# Patient Record
Sex: FEMALE | Race: WHITE | Employment: OTHER | URBAN - METROPOLITAN AREA
[De-identification: names, ages, dates, MRNs, and addresses within clinical notes are randomized per-mention and may not be internally consistent; named-entity substitution may affect disease eponyms.]

---

## 2017-03-16 ENCOUNTER — OFFICE VISIT (OUTPATIENT)
Dept: DERMATOLOGY | Facility: CLINIC | Age: 82
End: 2017-03-16

## 2017-03-16 DIAGNOSIS — L57.0 AK (ACTINIC KERATOSIS): Primary | ICD-10-CM

## 2017-03-16 DIAGNOSIS — L82.1 SK (SEBORRHEIC KERATOSIS): ICD-10-CM

## 2017-03-16 ASSESSMENT — PAIN SCALES - GENERAL: PAINLEVEL: NO PAIN (0)

## 2017-03-16 NOTE — PROGRESS NOTES
[facility] Dermatology Note      Dermatology Problem List:  1. AK  2. SK  Last CSE: 3/16/17    Encounter Date: Mar 16, 2017    CC:   Chief Complaint   Patient presents with     Derm Problem     Patient comes to clinic today for a skin exam. States no specific concerns at this time.         History of Present Illness:  Ms. Anca Alanis is a 89 year old female presenting for a skin examination, reporting raised spots on her back that she has accumulated over years, none painful or tender. She also reports a persistent scaly lesion on her L cheek, present for months, that she frequently scratches off.    Past Medical History:   Patient Active Problem List   Diagnosis     Anxiety state     Backache     Dysthymic disorder     Hearing loss     Spinal stenosis, lumbar region, without neurogenic claudication     Osteoarthritis of multiple joints     Osteoporosis     Scoliosis (and kyphoscoliosis), idiopathic     Health Care Home     Sleep disorder     Moderate stage glaucoma - Left Eye     Senile cataracts of both eyes     Late onset Alzheimer's disease without behavioral disturbance     Past Medical History   Diagnosis Date     Cataracts, both eyes      Glaucoma suspect      History reviewed. No pertinent past surgical history.    Family History:  There is no family history of melanoma.    Medications:  Current Outpatient Prescriptions   Medication Sig Dispense Refill     alendronate (FOSAMAX) 70 MG tablet Take 1 tablet (70 mg) by mouth every 7 days 12 tablet 3     memantine (NAMENDA) 5 MG tablet Take 1 tablet (5 mg) by mouth 2 times daily 180 tablet 3     donepezil (ARICEPT) 5 MG tablet Take 1 tablet (5 mg) by mouth At Bedtime 90 tablet 3     acetaminophen (TYLENOL) 325 MG tablet Take 2 tablets (650 mg) by mouth 3 times daily For 5 days 100 tablet 0     zolpidem (AMBIEN) 5 MG tablet Take 0.5 tablets (2.5 mg) by mouth nightly as needed for sleep (Use only when having difficulty sleeping in an  environment away from home.  Try 1/2 tablet and use sparingly.) 5 tablet 0     Calcium Carbonate-Vitamin D (CALCIUM + D PO) Take  by mouth.       Multiple Vitamin (MULTIVITAMIN OR) Take  by mouth.       ibuprofen (ADVIL,MOTRIN) 600 MG tablet Take 1 tablet (600 mg) by mouth every 6 hours as needed for moderate pain For 5 days (Patient not taking: Reported on 3/16/2017) 90 tablet 1        Allergies   Allergen Reactions     Ciprofloxacin      Sulfa Drugs          Review of Systems:  General: No recent infections, fevers, chills, malaise, arthralgias, unexplained weight/appetite changes  Skin: No new/changing moles, nothing bleeding/nonhealing/painful/tender/rapidly-growing.  Lymphatic: No subcutaneous lumps/bumps.      Physical exam:  Vitals: There were no vitals taken for this visit.  GEN: Well-appearing elderly female, no discomfort or distress, cooperative with the exam  SKIN: Full skin, which includes the head/face, both arms, chest, back, abdomen,both legs, genitalia and/or groin buttocks, digits and/or nails, was examined.  - phototype I-II, mild dermatoheliosis  - L cheek: gritty 2mm papule  - numerous light brown waxy planar papules and plaques, none inflamed.  - occasional round red-purple macules/papules on the trunk  -No other lesions of concern on areas examined.     Impression/Plan:  1. Actinic keratosis , L medial cheek.  - discussed premalignant nature  - cryotherapy procedure note: After verbal consent and discussion of risks and benefits including but no limited to dyspigmentation/scar, blister, and pain, a single lesion was(were) treated with 1-2mm freeze border for 2 cycles with liquid nitrogen. Post cryotherapy instructions were provided.     2. Seborrheic keratoses  3. Cherry angiomas  - Discussed benign nature and reassured.    Follow-up: 1 year      Discussed and examined with [facility] staff dermatologist Dr. Rosanna Singleton.    Tariq Resendiz MD, MARCIO  PGY-4, Dermatology      Staff  Involved:  Resident(Tariq Resendiz)/Staff(as above)  I was present for the entire procedure. KB  .I, Rosanna Singleton MD, saw this patient with the resident and agree with the resident s findings and plan of care as documented in the resident s note.

## 2017-03-16 NOTE — NURSING NOTE
Dermatology Rooming Note    Anca Alanis's goals for this visit include:   Chief Complaint   Patient presents with     Derm Problem     Patient comes to clinic today for a skin exam. States no specific concerns at this time.     Kati Ayers, CMA

## 2017-03-16 NOTE — LETTER
3/16/2017       RE: Anca Alanis  [address]     [place] [address]     Dear Colleague,    Thank you for referring your patient, Anca Alanis, to the [facility] DERMATOLOGY at [facility]. Please see a copy of my visit note below.    [facility] Dermatology Note      Dermatology Problem List:  1. AK  2. SK  Last CSE: 3/16/17    Encounter Date: Mar 16, 2017    CC:   Chief Complaint   Patient presents with     Derm Problem     Patient comes to clinic today for a skin exam. States no specific concerns at this time.         History of Present Illness:  Ms. Anca Alanis is a 89 year old female presenting for a skin examination, reporting raised spots on her back that she has accumulated over years, none painful or tender. She also reports a persistent scaly lesion on her L cheek, present for months, that she frequently scratches off.    Past Medical History:   Patient Active Problem List   Diagnosis     Anxiety state     Backache     Dysthymic disorder     Hearing loss     Spinal stenosis, lumbar region, without neurogenic claudication     Osteoarthritis of multiple joints     Osteoporosis     Scoliosis (and kyphoscoliosis), idiopathic     Health Care Home     Sleep disorder     Moderate stage glaucoma - Left Eye     Senile cataracts of both eyes     Late onset Alzheimer's disease without behavioral disturbance     Past Medical History   Diagnosis Date     Cataracts, both eyes      Glaucoma suspect      History reviewed. No pertinent past surgical history.    Family History:  There is no family history of melanoma.    Medications:  Current Outpatient Prescriptions   Medication Sig Dispense Refill     alendronate (FOSAMAX) 70 MG tablet Take 1 tablet (70 mg) by mouth every 7 days 12 tablet 3     memantine (NAMENDA) 5 MG tablet Take 1 tablet (5 mg) by mouth 2 times daily 180 tablet 3     donepezil (ARICEPT) 5 MG tablet Take 1 tablet (5 mg)  by mouth At Bedtime 90 tablet 3     acetaminophen (TYLENOL) 325 MG tablet Take 2 tablets (650 mg) by mouth 3 times daily For 5 days 100 tablet 0     zolpidem (AMBIEN) 5 MG tablet Take 0.5 tablets (2.5 mg) by mouth nightly as needed for sleep (Use only when having difficulty sleeping in an environment away from home.  Try 1/2 tablet and use sparingly.) 5 tablet 0     Calcium Carbonate-Vitamin D (CALCIUM + D PO) Take  by mouth.       Multiple Vitamin (MULTIVITAMIN OR) Take  by mouth.       ibuprofen (ADVIL,MOTRIN) 600 MG tablet Take 1 tablet (600 mg) by mouth every 6 hours as needed for moderate pain For 5 days (Patient not taking: Reported on 3/16/2017) 90 tablet 1        Allergies   Allergen Reactions     Ciprofloxacin      Sulfa Drugs          Review of Systems:  General: No recent infections, fevers, chills, malaise, arthralgias, unexplained weight/appetite changes  Skin: No new/changing moles, nothing bleeding/nonhealing/painful/tender/rapidly-growing.  Lymphatic: No subcutaneous lumps/bumps.      Physical exam:  Vitals: There were no vitals taken for this visit.  GEN: Well-appearing elderly female, no discomfort or distress, cooperative with the exam  SKIN: Full skin, which includes the head/face, both arms, chest, back, abdomen,both legs, genitalia and/or groin buttocks, digits and/or nails, was examined.  - phototype I-II, mild dermatoheliosis  - L cheek: gritty 2mm papule  - numerous light brown waxy planar papules and plaques, none inflamed.  - occasional round red-purple macules/papules on the trunk  -No other lesions of concern on areas examined.     Impression/Plan:  1. Actinic keratosis , L medial cheek.  - discussed premalignant nature  - cryotherapy procedure note: After verbal consent and discussion of risks and benefits including but no limited to dyspigmentation/scar, blister, and pain, a single lesion was(were) treated with 1-2mm freeze border for 2 cycles with liquid nitrogen. Post cryotherapy  instructions were provided.     2. Seborrheic keratoses  3. Cherry angiomas  - Discussed benign nature and reassured.    Follow-up: 1 year      Discussed and examined with [facility] staff dermatologist Dr. Rosanna Singleton.    Tariq Resendiz MD, MARCIO  PGY-4, Dermatology      Staff Involved:  Resident(Tariq Resendiz)/Staff(as above)  I was present for the entire procedure. KB  .I, Rosanna Singleton MD, saw this patient with the resident and agree with the resident s findings and plan of care as documented in the resident s note.      Again, thank you for allowing me to participate in the care of your patient.      Sincerely,    Tariq Resendiz MD

## 2017-04-20 DIAGNOSIS — M81.0 OSTEOPOROSIS: ICD-10-CM

## 2017-04-20 DIAGNOSIS — G30.1 LATE ONSET ALZHEIMER'S DISEASE WITHOUT BEHAVIORAL DISTURBANCE (H): ICD-10-CM

## 2017-04-20 DIAGNOSIS — F02.80 LATE ONSET ALZHEIMER'S DISEASE WITHOUT BEHAVIORAL DISTURBANCE (H): ICD-10-CM

## 2017-04-20 RX ORDER — MEMANTINE HYDROCHLORIDE 5 MG/1
5 TABLET ORAL 2 TIMES DAILY
Qty: 180 TABLET | Refills: 3 | Status: SHIPPED | OUTPATIENT
Start: 2017-04-20 | End: 2018-03-21

## 2017-04-20 RX ORDER — ALENDRONATE SODIUM 70 MG/1
70 TABLET ORAL
Qty: 12 TABLET | Refills: 3 | Status: SHIPPED | OUTPATIENT
Start: 2017-04-20 | End: 2018-07-09

## 2017-04-20 RX ORDER — DONEPEZIL HYDROCHLORIDE 5 MG/1
5 TABLET, FILM COATED ORAL AT BEDTIME
Qty: 90 TABLET | Refills: 3 | Status: SHIPPED | OUTPATIENT
Start: 2017-04-20 | End: 2018-03-21

## 2017-04-26 ENCOUNTER — OFFICE VISIT (OUTPATIENT)
Dept: FAMILY MEDICINE | Facility: CLINIC | Age: 82
End: 2017-04-26

## 2017-04-26 VITALS
OXYGEN SATURATION: 100 % | TEMPERATURE: 97.2 F | WEIGHT: 105 LBS | SYSTOLIC BLOOD PRESSURE: 131 MMHG | RESPIRATION RATE: 16 BRPM | DIASTOLIC BLOOD PRESSURE: 62 MMHG | HEIGHT: 64 IN | BODY MASS INDEX: 17.93 KG/M2 | HEART RATE: 57 BPM

## 2017-04-26 DIAGNOSIS — G47.00 INSOMNIA, UNSPECIFIED TYPE: ICD-10-CM

## 2017-04-26 DIAGNOSIS — G30.1 LATE ONSET ALZHEIMER'S DISEASE WITHOUT BEHAVIORAL DISTURBANCE (H): Primary | ICD-10-CM

## 2017-04-26 DIAGNOSIS — F02.80 LATE ONSET ALZHEIMER'S DISEASE WITHOUT BEHAVIORAL DISTURBANCE (H): Primary | ICD-10-CM

## 2017-04-26 DIAGNOSIS — M81.0 OSTEOPOROSIS: ICD-10-CM

## 2017-04-26 DIAGNOSIS — R53.83 FATIGUE, UNSPECIFIED TYPE: ICD-10-CM

## 2017-04-26 LAB
% GRANULOCYTES: 72.8 %G (ref 40–75)
BUN SERPL-MCNC: 26.6 MG/DL (ref 7–19)
CALCIUM SERPL-MCNC: 10 MG/DL (ref 8.5–10.1)
CHLORIDE SERPLBLD-SCNC: 101 MMOL/L (ref 98–110)
CO2 SERPL-SCNC: 28.3 MMOL/L (ref 20–32)
CREAT SERPL-MCNC: 0.9 MG/DL (ref 0.5–1)
FOLATE SERPL-MCNC: 74.7 NG/ML
GFR SERPL CREATININE-BSD FRML MDRD: 62.2 ML/MIN/1.7 M2
GLUCOSE SERPL-MCNC: 107.3 MG'DL (ref 70–99)
GRANULOCYTES #: 3.1 K/UL (ref 1.6–8.3)
HCT VFR BLD AUTO: 40.9 % (ref 35–47)
HEMOGLOBIN: 12.8 G/DL (ref 11.7–15.7)
LYMPHOCYTES # BLD AUTO: 0.9 K/UL (ref 0.8–5.3)
LYMPHOCYTES NFR BLD AUTO: 20.6 %L (ref 20–48)
MCH RBC QN AUTO: 33.2 PG (ref 26.5–35)
MCHC RBC AUTO-ENTMCNC: 31.3 G/DL (ref 32–36)
MCV RBC AUTO: 106.3 FL (ref 78–100)
MID #: 0.3 K/UL (ref 0–2.2)
MID %: 6.6 %M (ref 0–20)
PLATELET # BLD AUTO: 188 K/UL (ref 150–450)
POTASSIUM SERPL-SCNC: 4.1 MMOL/DL (ref 3.3–4.5)
RBC # BLD AUTO: 3.85 M/UL (ref 3.8–5.2)
SODIUM SERPL-SCNC: 135.8 MMOL/L (ref 132.6–141.4)
TSH SERPL DL<=0.005 MIU/L-ACNC: 2.45 MU/L (ref 0.4–4)
VIT B12 SERPL-MCNC: 525 PG/ML (ref 193–986)
WBC # BLD AUTO: 4.3 K/UL (ref 4–11)

## 2017-04-26 RX ORDER — ZOLPIDEM TARTRATE 5 MG/1
2.5 TABLET ORAL
Qty: 5 TABLET | Refills: 0 | Status: SHIPPED | OUTPATIENT
Start: 2017-04-26 | End: 2018-03-03

## 2017-04-26 ASSESSMENT — ENCOUNTER SYMPTOMS
UNEXPECTED WEIGHT CHANGE: 0
FEVER: 0
CARDIOVASCULAR NEGATIVE: 1
DYSPHORIC MOOD: 0
FATIGUE: 1
WEAKNESS: 0
MYALGIAS: 0
ACTIVITY CHANGE: 0
NERVOUS/ANXIOUS: 0
CONFUSION: 1
RESPIRATORY NEGATIVE: 1
DIZZINESS: 0
GASTROINTESTINAL NEGATIVE: 1

## 2017-04-26 NOTE — MR AVS SNAPSHOT
After Visit Summary   2017    Anca Alanis    MRN: [id number]           Patient Information     Date Of Birth          1924        Visit Information        Provider Department      2017 12:50 PM Rafat Richards MD [facility]        Today's Diagnoses     Late onset Alzheimer's disease without behavioral disturbance    -  1    Osteoporosis        Fatigue, unspecified type        Insomnia, unspecified type          Care Instructions    1.  Continue same medications    2.  Use zolpidem sparingly when traveling for sleep    3.  Return in a month.        Follow-ups after your visit        Follow-up notes from your care team     Return in about 4 weeks (around 2017).      Who to contact     Please call your clinic at [phone] to:    Ask questions about your health    Make or cancel appointments    Discuss your medicines    Learn about your test results    Speak to your doctor   If you have compliments or concerns about an experience at your clinic, or if you wish to file a complaint, please contact [facility] Patient Relations at [phone] or email us at [email or web address]         Additional Information About Your Visit        MyChart Information     [employer] is an electronic gateway that provides easy, online access to your medical records. With [employer], you can request a clinic appointment, read your test results, renew a prescription or communicate with your care team.     To sign up for [employer] visit the website at [email or web address]   You will be asked to enter the access code listed below, as well as some personal information. Please follow the directions to create your username and password.     Your access code is: [email or web address]  Expires: 2017  7:30 AM     Your access code will  in 90 days. If you need help or a new code, please contact your [facility] or call  "[phone] for assistance.        Care EveryWhere ID     This is your Care EveryWhere ID. This could be used by other organizations to access your [facility] medical records  [id number]        Your Vitals Were     Pulse Temperature Respirations Height Pulse Oximetry BMI (Body Mass Index)    57 97.2  F (36.2  C) (Oral) 16 5' 4\" (162.6 cm) 100% 18.02 kg/m2       Blood Pressure from Last 3 Encounters:   04/26/17 131/62   06/29/16 134/66   04/06/16 145/75    Weight from Last 3 Encounters:   04/26/17 105 lb (47.6 kg)   06/29/16 106 lb 9.6 oz (48.4 kg)   04/06/16 107 lb (48.5 kg)              We Performed the Following     Basic Metabolic Panel ([facility])     CBC with Diff Plt ([facility])     Folate     TSH with free T4 reflex     Vitamin B12     Vitamin D Level          Where to get your medicines      Some of these will need a paper prescription and others can be bought over the counter.  Ask your nurse if you have questions.     Bring a paper prescription for each of these medications     zolpidem 5 MG tablet          Primary Care Provider Office Phone # Fax #    Rafat Richards [phone]       [facility] [address] [address]        Thank you!     Thank you for choosing [facility]  for your care. Our goal is always to provide you with excellent care. Hearing back from our patients is one way we can continue to improve our services. Please take a few minutes to complete the written survey that you may receive in the mail after your visit with us. Thank you!             Your Updated Medication List - Protect others around you: Learn how to safely use, store and throw away your medicines at www.disposemymeds.org.          This list is accurate as of: 4/26/17  1:29 PM.  Always use your most recent med list.                   Brand Name Dispense Instructions for use    acetaminophen 325 MG tablet    TYLENOL    100 tablet    Take 2 tablets (650 mg) by mouth 3 " times daily For 5 days       alendronate 70 MG tablet    FOSAMAX    12 tablet    Take 1 tablet (70 mg) by mouth every 7 days       CALCIUM + D PO      Take  by mouth.       donepezil 5 MG tablet    ARIcept    90 tablet    Take 1 tablet (5 mg) by mouth At Bedtime       ibuprofen 600 MG tablet    ADVIL/MOTRIN    90 tablet    Take 1 tablet (600 mg) by mouth every 6 hours as needed for moderate pain For 5 days       memantine 5 MG tablet    NAMENDA    180 tablet    Take 1 tablet (5 mg) by mouth 2 times daily       MULTIVITAMIN PO      Take  by mouth.       zolpidem 5 MG tablet    AMBIEN    5 tablet    Take 0.5 tablets (2.5 mg) by mouth nightly as needed for sleep (Use only when having difficulty sleeping in an environment away from home.  Try 1/2 tablet and use sparingly.)

## 2017-04-26 NOTE — LETTER
May 1, 2017      Anca Alanis  [address] [address]        Dear Anca,    Thank you for getting your care at [facility]. Please see below for your test results.    Resulted Orders   Basic Metabolic Panel (\[facility] [place] [facility]\"")   Result Value Ref Range    Urea Nitrogen 26.6 (H) 7.0 - 19.0 mg/dL    Calcium 10.0 8.5 - 10.1 mg/dL    Chloride 101.0 98.0 - 110.0 mmol/L    Carbon Dioxide 28.3 20.0 - 32.0 mmol/L    Creatinine 0.9 0.5 - 1.0 mg/dL    Glucose 107.3 (H) 70.0 - 99.0 mg'dL    Potassium 4.1 3.3 - 4.5 mmol/dL    Sodium 135.8 132.6 - 141.4 mmol/L    GFR Estimate 62.2 >60.0 mL/min/1.7 m2    GFR Estimate If Black 75.3 >60.0 mL/min/1.7 m2   Folate   Result Value Ref Range    Folate 74.7 >5.4 ng/mL   Vitamin D Level   Result Value Ref Range    Vitamin D Deficiency screening 38 20 - 75 ug/L      Comment:      Season, race, dietary intake, and treatment affect the concentration of   25-hydroxy-Vitamin D. Values may decrease during winter months and increase   during summer months. Values 20-29 ug/L may indicate Vitamin D insufficiency   and values <20 ug/L may indicate Vitamin D deficiency.   Vitamin D determination is routinely performed by an immunoassay specific for   25 hydroxyvitamin D3.  If an individual is on vitamin D2 (ergocalciferol)   supplementation, please specify 25 OH vitamin D2 and D3 level determination   by   LCMSMS test VITD23.     Vitamin B12   Result Value Ref Range    Vitamin B12 525 193 - 986 pg/mL   CBC with Diff Plt (\[facility] [place] [facility]\"")   Result Value Ref Range    WBC 4.3 4.0 - 11.0 K/uL    Lymphocytes # 0.9 0.8 - 5.3 K/uL    % Lymphocytes 20.6 20.0 - 48.0 %L    Mid # 0.3 0.0 - 2.2 K/uL    Mid % 6.6 0.0 - 20.0 %M    GRANULOCYTES # 3.1 1.6 - 8.3 K/uL    % Granulocytes 72.8 40.0 - 75.0 %G    RBC 3.85 3.80 - 5.20 M/uL    Hemoglobin 12.8 11.7 - 15.7 g/dL    Hematocrit 40.9 35.0 - 47.0 %    .3 (H) 78.0 - 100.0 fL    MCH 33.2 26.5 - 35.0 pg    MCHC 31.3 (L) 32.0 - 36.0 g/dL     Platelets 188.0 150.0 - 450.0 K/uL   TSH with free T4 reflex   Result Value Ref Range    TSH 2.45 0.40 - 4.00 mU/L       All of your lab work looks excellent.      Sincerely,    Rafat Richards MD

## 2017-04-26 NOTE — PROGRESS NOTES
"      HPI:       Anca Alanis is a 89 year old who presents for the following  Patient presents with:  RECHECK: Alzheimer follow up, patient requesting to get toe nails trimmed    F/U AD.  Living with  and stable.  Independent in ADLs and dependent in all IADLs.  C/o generalized fatigue but still able to perform all her usual activities.  Going to [place] to visit son next week.   reports no acute concerns.  No falls.  No pain.         Problem, Medication and Allergy Lists were reviewed and are current.  Patient is an established patient of this clinic.         Review of Systems:   Review of Systems   Constitutional: Positive for fatigue. Negative for activity change, fever and unexpected weight change.   Respiratory: Negative.    Cardiovascular: Negative.    Gastrointestinal: Negative.    Musculoskeletal: Negative for gait problem and myalgias.   Neurological: Negative for dizziness and weakness.   Psychiatric/Behavioral: Positive for confusion. Negative for behavioral problems and dysphoric mood. The patient is not nervous/anxious.              Physical Exam:   Patient Vitals for the past 24 hrs:   BP Temp Temp src Pulse Resp SpO2 Height Weight   04/26/17 1257 131/62 97.2  F (36.2  C) Oral 57 16 100 % 5' 4\" (162.6 cm) 105 lb (47.6 kg)     Body mass index is 18.02 kg/(m^2).  Vitals were reviewed and were normal     Physical Exam   Constitutional: She appears well-developed. No distress.   Thin.     HENT:   Head: Normocephalic and atraumatic.   Neck: Neck supple. No thyromegaly present.   Cardiovascular: Normal rate, regular rhythm and normal heart sounds.  Exam reveals no gallop.    No murmur heard.  Pulmonary/Chest: Effort normal. No respiratory distress. She has no wheezes. She has no rales.   Musculoskeletal: She exhibits no edema or tenderness.   Normal gait.   Lymphadenopathy:     She has no cervical adenopathy.   Neurological: She is alert.   Psychiatric: She has a normal mood and " affect.   Vitals reviewed.        Results:       Assessment and Plan     Anca was seen today for recheck.    Diagnoses and all orders for this visit:    Late onset Alzheimer's disease without behavioral disturbance - middle stage.  Stable on memantine and donepezil.    Osteoporosis - continue alendronate, 2018 will be 5 yrs of therapy.    Fatigue, unspecified type - she has complained of this before.  No significant findings on PE.  Will check labwork.  -     Basic Metabolic Panel ([facility])  -     Folate  -     Vitamin D Level  -     Vitamin B12  -     CBC with Diff Plt ([facility])  -     TSH with free T4 reflex    Insomnia, unspecified type - zolpidem to be used ONLY for difficulty sleeping when traveling.  -     zolpidem (AMBIEN) 5 MG tablet; Take 0.5 tablets (2.5 mg) by mouth nightly as needed for sleep (Use only when having difficulty sleeping in an environment away from home.  Try 1/2 tablet and use sparingly.)      Medications Discontinued During This Encounter   Medication Reason     zolpidem (AMBIEN) 5 MG tablet Reorder     Options for treatment and follow-up care were reviewed with the patient. Anca IRAJ Zeke  engaged in the decision making process and verbalized understanding of the options discussed and agreed with the final plan.    Rafat Richards MD

## 2017-04-26 NOTE — PATIENT INSTRUCTIONS
1.  Continue same medications    2.  Use zolpidem sparingly when traveling for sleep    3.  Return in a month.

## 2017-04-27 LAB — DEPRECATED CALCIDIOL+CALCIFEROL SERPL-MC: 38 UG/L (ref 20–75)

## 2017-08-08 ENCOUNTER — OFFICE VISIT (OUTPATIENT)
Dept: INTERNAL MEDICINE | Facility: CLINIC | Age: 82
End: 2017-08-08

## 2017-08-08 DIAGNOSIS — L03.039 INFECTION OF TOENAIL: Primary | ICD-10-CM

## 2017-08-08 ASSESSMENT — PAIN SCALES - GENERAL: PAINLEVEL: NO PAIN (0)

## 2017-08-08 NOTE — NURSING NOTE
Chief Complaint   Patient presents with     Establish Care     pt here to establish care     Toenail     pt states she has an infection under toenail on right foot     Verna Blood CMA at 9:01 AM on 8/8/2017.

## 2017-08-08 NOTE — PATIENT INSTRUCTIONS
[facility] Medication Refill Request Information:  * Please contact your pharmacy regarding ANY request for medication refills.  ** [facility] Prescription Fax = [phone]  * Please allow 3 business days for routine medication refills.  * Please allow 5 business days for controlled substance medication refills.     [facility] Test Result notification information:  *You will be notified with in 7-10 days of your appointment day regarding the results of your test.  If you are on MyChart you will be notified as soon as the provider has reviewed the results and signed off on them.    [facility] [phone]

## 2017-08-08 NOTE — MR AVS SNAPSHOT
After Visit Summary   8/8/2017    Anca Alanis    MRN: [id number]           Patient Information     Date Of Birth          5/2/1924        Visit Information        Provider Department      8/8/2017 9:10 AM Haja Kothari MD [facility]        Care Instructions    Primary Care Center Medication Refill Request Information:  * Please contact your pharmacy regarding ANY request for medication refills.  ** [facility] Prescription Fax = [phone]  * Please allow 3 business days for routine medication refills.  * Please allow 5 business days for controlled substance medication refills.     Primary Care Center Test Result notification information:  *You will be notified with in 7-10 days of your appointment day regarding the results of your test.  If you are on MyChart you will be notified as soon as the provider has reviewed the results and signed off on them.    [facility] [phone]             Follow-ups after your visit        Your next 10 appointments already scheduled     Aug 09, 2017  2:40 PM CDT   Return Visit with Rafat Richards MD   [facility] ([facility])    [facility] [address] [place] [address],  [address]  [place] [address]   [phone]              Who to contact     Please call your clinic at [phone] to:    Ask questions about your health    Make or cancel appointments    Discuss your medicines    Learn about your test results    Speak to your doctor   If you have compliments or concerns about an experience at your clinic, or if you wish to file a complaint, please contact [facility] Patient Relations at [phone] or email us at [email or web address]         Additional Information About Your Visit        MyChart Information     [employer] Smart[employer] is an electronic gateway that provides easy, online access to your medical records. With [employer], you can request a clinic appointment, read your test results,  renew a prescription or communicate with your care team.     To sign up for MyChart visit the website at [email or web address]   You will be asked to enter the access code listed below, as well as some personal information. Please follow the directions to create your username and password.     Your access code is: [email or web address]  Expires: 10/30/2017  6:31 AM     Your access code will  in 90 days. If you need help or a new code, please contact your [facility] or call [phone] for assistance.        Care EveryWhere ID     This is your Care EveryWhere ID. This could be used by other organizations to access your [facility] medical records  [id number]         Blood Pressure from Last 3 Encounters:   17 (P) 91/57   17 131/62   16 134/66    Weight from Last 3 Encounters:   17 (P) 45.7 kg (100 lb 11.2 oz)   17 47.6 kg (105 lb)   16 48.4 kg (106 lb 9.6 oz)              Today, you had the following     No orders found for display       Primary Care Provider Office Phone # Fax #    Rafat Richards [phone]       [facility] [address] [address]        Equal Access to Services     CUCO [place] : Hadii aad ku hadasho Soomaali, waaxda luqadaha, qaybta kaalmada adeegyada, kiana velázquez. So [facility] [phone].    ATENCIÓN: Si habla español, tiene a gillette disposición servicios gratuitos de asistencia lingüística. Christine al [phone].    We comply with applicable federal civil rights laws and [place] laws. We do not discriminate on the basis of race, color, national origin, age, disability sex, sexual orientation or gender identity.            Thank you!     Thank you for choosing [facility]  for your care. Our goal is always to provide you with excellent care. Hearing back from our patients is one way we can continue to improve our services. Please take a few  minutes to complete the written survey that you may receive in the mail after your visit with us. Thank you!             Your Updated Medication List - Protect others around you: Learn how to safely use, store and throw away your medicines at www.disposemymeds.org.          This list is accurate as of: 8/8/17  9:44 AM.  Always use your most recent med list.                   Brand Name Dispense Instructions for use Diagnosis    acetaminophen 325 MG tablet    TYLENOL    100 tablet    Take 2 tablets (650 mg) by mouth 3 times daily For 5 days    Midline low back pain without sciatica       alendronate 70 MG tablet    FOSAMAX    12 tablet    Take 1 tablet (70 mg) by mouth every 7 days    Osteoporosis       CALCIUM + D PO      Take  by mouth.        donepezil 5 MG tablet    ARIcept    90 tablet    Take 1 tablet (5 mg) by mouth At Bedtime    Late onset Alzheimer's disease without behavioral disturbance       ibuprofen 600 MG tablet    ADVIL/MOTRIN    90 tablet    Take 1 tablet (600 mg) by mouth every 6 hours as needed for moderate pain For 5 days    Midline low back pain without sciatica       memantine 5 MG tablet    NAMENDA    180 tablet    Take 1 tablet (5 mg) by mouth 2 times daily    Late onset Alzheimer's disease without behavioral disturbance       MULTIVITAMIN PO      Take  by mouth.        zolpidem 5 MG tablet    AMBIEN    5 tablet    Take 0.5 tablets (2.5 mg) by mouth nightly as needed for sleep (Use only when having difficulty sleeping in an environment away from home.  Try 1/2 tablet and use sparingly.)    Insomnia, unspecified type

## 2017-08-08 NOTE — PROGRESS NOTES
PRIMARY CARE CLINIC    Resident Clinic Note        Visit Date: August 8, 2017    Anca Alanis  MRN: [id number]  YOB: 1924    HPI:  Anca Alanis is a 89 year old female  has a past medical history of Cataracts, Osteoporosis, Sleep disorder, Anxiety, and late onset Alzheimer's disease.     Patient presents to clinic with her  who helped with HPI.    She is her at the clinic with a signal issue, concerns for toe nail infection.  Patient report that her right great toe has been abnormal for several weeks with some breakdown of there toe nail and mild redness.  No issues with drainage or systemic symptoms including fever, chills or sweats.  No pain associated with the toe and the patient and her  think the toe is getting better without treatment.  She has not tried any topical or antibiotic treatment.  No history of trauma or recent falls.  Patient reports that her health is otherwise very good.    ROS:  7 point ROS was reviewed and negative other than stated in HPI    Past medical history reviewed.    Past Medical History:   Diagnosis Date     Cataracts, both eyes      Glaucoma suspect        Allergies   Allergen Reactions     Ciprofloxacin      Sulfa Drugs        No past surgical history on file.    Family History   Problem Relation Age of Onset     Hypertension Sister      DIABETES Paternal Grandmother      DIABETES Son        Social History     Social History     Marital status:      Spouse name: N/A     Number of children: N/A     Years of education: N/A     Occupational History     Not on file.     Social History Main Topics     Smoking status: Never Smoker     Smokeless tobacco: Not on file     Alcohol use No     Drug use: No     Sexual activity: Not on file     Other Topics Concern     Not on file     Social History Narrative       Current Outpatient Prescriptions   Medication     zolpidem (AMBIEN) 5 MG tablet     alendronate (FOSAMAX) 70 MG tablet     memantine  (NAMENDA) 5 MG tablet     donepezil (ARICEPT) 5 MG tablet     ibuprofen (ADVIL,MOTRIN) 600 MG tablet     acetaminophen (TYLENOL) 325 MG tablet     Calcium Carbonate-Vitamin D (CALCIUM + D PO)     Multiple Vitamin (MULTIVITAMIN OR)     No current facility-administered medications for this visit.        Vitals:  BP (P) 91/57  Pulse (P) 62  Wt (P) 45.7 kg (100 lb 11.2 oz)  BMI (P) 17.29 kg/m2    Physical Exam:  General: NAD, sitting in chair  HEENT: Oral mucosa moist and non-erythematous, PERRLA, EOM intact  CV: RRR, normal S1S2, no m/c/r  Resp: Clear to auscultation bilaterally, no wheezes or crackles  Abd: Soft, non-tender, BS+, no masses appreciated  Extremities: WWP, no pedal edema, right big toe nail slightly reduced with some healing sub nail erythema, no pain with palpation or discharge.    Neuro: AAOx3, no focal neurologic deficits     Assessment/Plan:  Patient presents for assessment of possible toe nail infection.    # Right great toe nail infection:  Appears to be resolving with no intervention, including antibiotics. non-painful, no signs of active infection, possible fungal etiology.    - No need for further intervention at this time   - If worsening would recommend referral to podiatry  - No need for acute follow up     Health Maintenance: No health maintenance this appointment    Follow-up: Follow up with Dr. Richards for normally scheduled care    Patient seen and discussed with Dr. Mavis Kothari MD, [facility]  Internal Medicine PGY-2  [facility]  Pager: [phone]       Teaching Physician Note:  I was present during the visit and the patient was seen and examined by me.   I discussed the case with the resident and agree with the note as documented by the resident with the following exceptions:  None.    Bessy Gamble M.D.  Internal Medicine   pager [phone]

## 2017-10-25 ENCOUNTER — OFFICE VISIT (OUTPATIENT)
Dept: FAMILY MEDICINE | Facility: CLINIC | Age: 82
End: 2017-10-25

## 2017-10-25 VITALS
WEIGHT: 103 LBS | BODY MASS INDEX: 17.58 KG/M2 | HEART RATE: 54 BPM | SYSTOLIC BLOOD PRESSURE: 133 MMHG | RESPIRATION RATE: 16 BRPM | DIASTOLIC BLOOD PRESSURE: 58 MMHG | TEMPERATURE: 97.6 F | OXYGEN SATURATION: 98 % | HEIGHT: 64 IN

## 2017-10-25 DIAGNOSIS — F02.80 LATE ONSET ALZHEIMER'S DISEASE WITHOUT BEHAVIORAL DISTURBANCE (H): Primary | ICD-10-CM

## 2017-10-25 DIAGNOSIS — K62.5 RECTAL BLEEDING: ICD-10-CM

## 2017-10-25 DIAGNOSIS — G30.1 LATE ONSET ALZHEIMER'S DISEASE WITHOUT BEHAVIORAL DISTURBANCE (H): Primary | ICD-10-CM

## 2017-10-25 ASSESSMENT — ENCOUNTER SYMPTOMS
NERVOUS/ANXIOUS: 0
ANAL BLEEDING: 1
FATIGUE: 0
CONFUSION: 1
RECTAL PAIN: 0
RESPIRATORY NEGATIVE: 1
MYALGIAS: 0
DYSPHORIC MOOD: 0
CARDIOVASCULAR NEGATIVE: 1
UNEXPECTED WEIGHT CHANGE: 0
ACTIVITY CHANGE: 0
FEVER: 0

## 2017-10-25 NOTE — MR AVS SNAPSHOT
After Visit Summary   10/25/2017    Anca Alanis    MRN: [id number]           Patient Information     Date Of Birth          1924        Visit Information        Provider Department      10/25/2017 3:00 PM Rafta Richards MD [facility]        Today's Diagnoses     Late onset Alzheimer's disease without behavioral disturbance    -  1    Rectal bleeding          Care Instructions    STOP alendronate beginning in January.    Try to drink more water during the day.          Follow-ups after your visit        Who to contact     Please call your clinic at [phone] to:    Ask questions about your health    Make or cancel appointments    Discuss your medicines    Learn about your test results    Speak to your doctor   If you have compliments or concerns about an experience at your clinic, or if you wish to file a complaint, please contact [facility] Patient Relations at [phone] or email us at [email or web address]         Additional Information About Your Visit        MyChart Information     [email or web address] is an electronic gateway that provides easy, online access to your medical records. With [employer] / [employer], you can request a clinic appointment, read your test results, renew a prescription or communicate with your care team.     To sign up for [email or web address] visit the website at [email or web address]   You will be asked to enter the access code listed below, as well as some personal information. Please follow the directions to create your username and password.     Your access code is: [email or web address]  Expires: 10/30/2017  6:31 AM     Your access code will  in 90 days. If you need help or a new code, please contact your [facility] or call [phone] for assistance.        Care EveryWhere ID     This is your Care EveryWhere ID. This could be used by other organizations to access your [facility]  "records  [id number]        Your Vitals Were     Pulse Temperature Respirations Height Pulse Oximetry BMI (Body Mass Index)    54 97.6  F (36.4  C) (Oral) 16 5' 4\" (162.6 cm) 98% 17.68 kg/m2       Blood Pressure from Last 3 Encounters:   10/25/17 133/58   08/08/17 (P) 91/57   04/26/17 131/62    Weight from Last 3 Encounters:   10/25/17 103 lb (46.7 kg)   08/08/17 (P) 100 lb 11.2 oz (45.7 kg)   04/26/17 105 lb (47.6 kg)              Today, you had the following     No orders found for display       Primary Care Provider Office Phone # Fax #    Rafat Richards [phone]       [address] [address]        Equal Access to Services     CUCO [place] : Lisbeth Shields, wacharlie gerber, ariane richardson, kiana burgos . So [facility] [phone].    ATENCIÓN: Si habla español, tiene a gillette disposición servicios gratuitos de asistencia lingüística. Christine al [phone].    We comply with applicable federal civil rights laws and [place] laws. We do not discriminate on the basis of race, color, national origin, age, disability, sex, sexual orientation, or gender identity.            Thank you!     Thank you for choosing [facility]  for your care. Our goal is always to provide you with excellent care. Hearing back from our patients is one way we can continue to improve our services. Please take a few minutes to complete the written survey that you may receive in the mail after your visit with us. Thank you!             Your Updated Medication List - Protect others around you: Learn how to safely use, store and throw away your medicines at www.disposemymeds.org.          This list is accurate as of: 10/25/17  3:58 PM.  Always use your most recent med list.                   Brand Name Dispense Instructions for use Diagnosis    acetaminophen 325 MG tablet    TYLENOL    100 tablet    Take 2 tablets (650 mg) by mouth 3 times " daily For 5 days    Midline low back pain without sciatica       alendronate 70 MG tablet    FOSAMAX    12 tablet    Take 1 tablet (70 mg) by mouth every 7 days    Osteoporosis       CALCIUM + D PO      Take  by mouth.        donepezil 5 MG tablet    ARIcept    90 tablet    Take 1 tablet (5 mg) by mouth At Bedtime    Late onset Alzheimer's disease without behavioral disturbance       ibuprofen 600 MG tablet    ADVIL/MOTRIN    90 tablet    Take 1 tablet (600 mg) by mouth every 6 hours as needed for moderate pain For 5 days    Midline low back pain without sciatica       memantine 5 MG tablet    NAMENDA    180 tablet    Take 1 tablet (5 mg) by mouth 2 times daily    Late onset Alzheimer's disease without behavioral disturbance       MULTIVITAMIN PO      Take  by mouth.        zolpidem 5 MG tablet    AMBIEN    5 tablet    Take 0.5 tablets (2.5 mg) by mouth nightly as needed for sleep (Use only when having difficulty sleeping in an environment away from home.  Try 1/2 tablet and use sparingly.)    Insomnia, unspecified type

## 2017-10-25 NOTE — PROGRESS NOTES
"      HPI:       Anca Alanis is a 89 year old who presents for the following  Patient presents with:  RECHECK: Follow up        Concern: Follow up    Alzheimer's disease.  Pt stable, states her memory is declining.  This is confirmed by .  She is independent in ADLs.  Walks daily and participates in social activities.  Thinks she is maintaining her weight.    Reports a couple of episodes of BRB on toilet paper.  No current symptoms.           Adherence and Exercise  Medication side effects: no  How often is a medication missed? Less than 1 time per week  Exercise:walking  6-7 days/week           Problem, Medication and Allergy Lists were reviewed and are current.  Patient is an established patient of this clinic.         Review of Systems:   Review of Systems   Constitutional: Negative for activity change, fatigue, fever and unexpected weight change.   Respiratory: Negative.    Cardiovascular: Negative.    Gastrointestinal: Positive for anal bleeding. Negative for rectal pain.   Musculoskeletal: Negative for gait problem and myalgias.   Psychiatric/Behavioral: Positive for confusion. Negative for behavioral problems and dysphoric mood. The patient is not nervous/anxious.              Physical Exam:   Patient Vitals for the past 24 hrs:   BP Temp Temp src Pulse Resp SpO2 Height Weight   10/25/17 1511 133/58 97.6  F (36.4  C) Oral 54 16 98 % 5' 4\" (162.6 cm) 103 lb (46.7 kg)     Body mass index is 17.68 kg/(m^2).  Vitals were reviewed and were normal     Physical Exam   Constitutional: She appears well-developed. No distress.   Thin.     HENT:   Head: Normocephalic and atraumatic.   Neck: Neck supple. No thyromegaly present.   Cardiovascular: Normal rate, regular rhythm and normal heart sounds.  Exam reveals no gallop.    No murmur heard.  Pulmonary/Chest: Effort normal. No respiratory distress. She has no wheezes. She has no rales.   Genitourinary:   Genitourinary Comments: Anus shows 3 soft external " hemorroids, NT.   Musculoskeletal: She exhibits no edema or tenderness.   Normal gait.   Lymphadenopathy:     She has no cervical adenopathy.   Neurological: She is alert.   Psychiatric: She has a normal mood and affect.   Vitals reviewed.        Results:       Assessment and Plan     Anca was seen today for recheck.    Diagnoses and all orders for this visit:    Late onset Alzheimer's disease without behavioral disturbance - doing well, continue memantine.    Rectal bleeding - likely secondary to hemorrhoids.  Recommended increasing fluid intake to soften stools.    Osteoporosis - will finish 5 yrs of alendronate therapy this year.  D/C alendronate 1/18.      There are no discontinued medications.  Options for treatment and follow-up care were reviewed with the patient. Anca GALO Zeke  engaged in the decision making process and verbalized understanding of the options discussed and agreed with the final plan.    Rafat Richards MD

## 2018-03-03 ENCOUNTER — APPOINTMENT (OUTPATIENT)
Dept: ULTRASOUND IMAGING | Facility: CLINIC | Age: 83
DRG: 357 | End: 2018-03-03
Attending: STUDENT IN AN ORGANIZED HEALTH CARE EDUCATION/TRAINING PROGRAM
Payer: MEDICARE

## 2018-03-03 ENCOUNTER — HOSPITAL ENCOUNTER (INPATIENT)
Facility: CLINIC | Age: 83
LOS: 5 days | Discharge: NURSING, REHAB OR LONG-TERM CARE | DRG: 357 | End: 2018-03-08
Attending: EMERGENCY MEDICINE | Admitting: SURGERY
Payer: MEDICARE

## 2018-03-03 ENCOUNTER — APPOINTMENT (OUTPATIENT)
Dept: CT IMAGING | Facility: CLINIC | Age: 83
DRG: 357 | End: 2018-03-03
Attending: EMERGENCY MEDICINE
Payer: MEDICARE

## 2018-03-03 DIAGNOSIS — R19.00 ABDOMINAL MASS, UNSPECIFIED ABDOMINAL LOCATION: ICD-10-CM

## 2018-03-03 DIAGNOSIS — R19.03 ABDOMINAL MASS, RLQ (RIGHT LOWER QUADRANT): ICD-10-CM

## 2018-03-03 PROBLEM — R10.9 ABDOMINAL PAIN: Status: ACTIVE | Noted: 2018-03-03

## 2018-03-03 LAB
ALBUMIN UR-MCNC: 10 MG/DL
ANION GAP SERPL CALCULATED.3IONS-SCNC: 11 MMOL/L (ref 3–14)
APPEARANCE UR: CLEAR
APTT PPP: 30 SEC (ref 22–37)
BASOPHILS # BLD AUTO: 0 10E9/L (ref 0–0.2)
BASOPHILS NFR BLD AUTO: 0.1 %
BILIRUB UR QL STRIP: NEGATIVE
BUN SERPL-MCNC: 33 MG/DL (ref 7–30)
CALCIUM SERPL-MCNC: 9.1 MG/DL (ref 8.5–10.1)
CHLORIDE SERPL-SCNC: 97 MMOL/L (ref 94–109)
CO2 SERPL-SCNC: 25 MMOL/L (ref 20–32)
COLOR UR AUTO: YELLOW
CREAT SERPL-MCNC: 0.83 MG/DL (ref 0.52–1.04)
DIFFERENTIAL METHOD BLD: ABNORMAL
EOSINOPHIL # BLD AUTO: 0 10E9/L (ref 0–0.7)
EOSINOPHIL NFR BLD AUTO: 0.2 %
ERYTHROCYTE [DISTWIDTH] IN BLOOD BY AUTOMATED COUNT: 13 % (ref 10–15)
GFR SERPL CREATININE-BSD FRML MDRD: 64 ML/MIN/1.7M2
GLUCOSE SERPL-MCNC: 116 MG/DL (ref 70–99)
GLUCOSE UR STRIP-MCNC: NEGATIVE MG/DL
HCT VFR BLD AUTO: 38.3 % (ref 35–47)
HGB BLD-MCNC: 12.5 G/DL (ref 11.7–15.7)
HGB UR QL STRIP: NEGATIVE
IMM GRANULOCYTES # BLD: 0.1 10E9/L (ref 0–0.4)
IMM GRANULOCYTES NFR BLD: 0.5 %
INR PPP: 1.27 (ref 0.86–1.14)
KETONES UR STRIP-MCNC: 40 MG/DL
LEUKOCYTE ESTERASE UR QL STRIP: ABNORMAL
LYMPHOCYTES # BLD AUTO: 0.4 10E9/L (ref 0.8–5.3)
LYMPHOCYTES NFR BLD AUTO: 3.7 %
MCH RBC QN AUTO: 32.1 PG (ref 26.5–33)
MCHC RBC AUTO-ENTMCNC: 32.6 G/DL (ref 31.5–36.5)
MCV RBC AUTO: 99 FL (ref 78–100)
MONOCYTES # BLD AUTO: 0.8 10E9/L (ref 0–1.3)
MONOCYTES NFR BLD AUTO: 7.1 %
MUCOUS THREADS #/AREA URNS LPF: PRESENT /LPF
NEUTROPHILS # BLD AUTO: 9.9 10E9/L (ref 1.6–8.3)
NEUTROPHILS NFR BLD AUTO: 88.4 %
NITRATE UR QL: NEGATIVE
NRBC # BLD AUTO: 0 10*3/UL
NRBC BLD AUTO-RTO: 0 /100
PH UR STRIP: 6 PH (ref 5–7)
PLATELET # BLD AUTO: 315 10E9/L (ref 150–450)
POTASSIUM SERPL-SCNC: 4 MMOL/L (ref 3.4–5.3)
RADIOLOGIST FLAGS: ABNORMAL
RBC # BLD AUTO: 3.89 10E12/L (ref 3.8–5.2)
RBC #/AREA URNS AUTO: 4 /HPF (ref 0–2)
SODIUM SERPL-SCNC: 134 MMOL/L (ref 133–144)
SOURCE: ABNORMAL
SP GR UR STRIP: 1.01 (ref 1–1.03)
SQUAMOUS #/AREA URNS AUTO: 1 /HPF (ref 0–1)
UROBILINOGEN UR STRIP-MCNC: NORMAL MG/DL (ref 0–2)
WBC # BLD AUTO: 11.3 10E9/L (ref 4–11)
WBC #/AREA URNS AUTO: 5 /HPF (ref 0–5)

## 2018-03-03 PROCEDURE — 40000141 ZZH STATISTIC PERIPHERAL IV START W/O US GUIDANCE

## 2018-03-03 PROCEDURE — 12000008 ZZH R&B INTERMEDIATE UMMC

## 2018-03-03 PROCEDURE — 85730 THROMBOPLASTIN TIME PARTIAL: CPT | Performed by: EMERGENCY MEDICINE

## 2018-03-03 PROCEDURE — 81001 URINALYSIS AUTO W/SCOPE: CPT | Performed by: EMERGENCY MEDICINE

## 2018-03-03 PROCEDURE — A9270 NON-COVERED ITEM OR SERVICE: HCPCS | Mod: GY | Performed by: STUDENT IN AN ORGANIZED HEALTH CARE EDUCATION/TRAINING PROGRAM

## 2018-03-03 PROCEDURE — 99285 EMERGENCY DEPT VISIT HI MDM: CPT | Mod: 25 | Performed by: EMERGENCY MEDICINE

## 2018-03-03 PROCEDURE — 25000128 H RX IP 250 OP 636: Performed by: STUDENT IN AN ORGANIZED HEALTH CARE EDUCATION/TRAINING PROGRAM

## 2018-03-03 PROCEDURE — 85025 COMPLETE CBC W/AUTO DIFF WBC: CPT | Performed by: EMERGENCY MEDICINE

## 2018-03-03 PROCEDURE — 96361 HYDRATE IV INFUSION ADD-ON: CPT | Performed by: EMERGENCY MEDICINE

## 2018-03-03 PROCEDURE — 85610 PROTHROMBIN TIME: CPT | Performed by: EMERGENCY MEDICINE

## 2018-03-03 PROCEDURE — 99285 EMERGENCY DEPT VISIT HI MDM: CPT | Mod: Z6 | Performed by: EMERGENCY MEDICINE

## 2018-03-03 PROCEDURE — 74177 CT ABD & PELVIS W/CONTRAST: CPT

## 2018-03-03 PROCEDURE — 25000128 H RX IP 250 OP 636: Performed by: RADIOLOGY

## 2018-03-03 PROCEDURE — 80048 BASIC METABOLIC PNL TOTAL CA: CPT | Performed by: EMERGENCY MEDICINE

## 2018-03-03 PROCEDURE — 25000132 ZZH RX MED GY IP 250 OP 250 PS 637: Mod: GY | Performed by: STUDENT IN AN ORGANIZED HEALTH CARE EDUCATION/TRAINING PROGRAM

## 2018-03-03 PROCEDURE — 25000125 ZZHC RX 250: Performed by: RADIOLOGY

## 2018-03-03 PROCEDURE — 87086 URINE CULTURE/COLONY COUNT: CPT | Performed by: EMERGENCY MEDICINE

## 2018-03-03 PROCEDURE — 93976 VASCULAR STUDY: CPT

## 2018-03-03 PROCEDURE — 25000128 H RX IP 250 OP 636: Performed by: EMERGENCY MEDICINE

## 2018-03-03 PROCEDURE — 96360 HYDRATION IV INFUSION INIT: CPT | Performed by: EMERGENCY MEDICINE

## 2018-03-03 RX ORDER — HYDROMORPHONE HCL/0.9% NACL/PF 0.2MG/0.2
0.2 SYRINGE (ML) INTRAVENOUS
Status: DISCONTINUED | OUTPATIENT
Start: 2018-03-03 | End: 2018-03-08 | Stop reason: HOSPADM

## 2018-03-03 RX ORDER — ONDANSETRON 2 MG/ML
4 INJECTION INTRAMUSCULAR; INTRAVENOUS EVERY 30 MIN PRN
Status: DISCONTINUED | OUTPATIENT
Start: 2018-03-03 | End: 2018-03-03

## 2018-03-03 RX ORDER — ALENDRONATE SODIUM 70 MG/1
70 TABLET ORAL
Status: DISCONTINUED | OUTPATIENT
Start: 2018-03-03 | End: 2018-03-03 | Stop reason: CLARIF

## 2018-03-03 RX ORDER — BISACODYL 10 MG
10 SUPPOSITORY, RECTAL RECTAL DAILY PRN
Status: DISCONTINUED | OUTPATIENT
Start: 2018-03-03 | End: 2018-03-08 | Stop reason: HOSPADM

## 2018-03-03 RX ORDER — NALOXONE HYDROCHLORIDE 0.4 MG/ML
.1-.4 INJECTION, SOLUTION INTRAMUSCULAR; INTRAVENOUS; SUBCUTANEOUS
Status: DISCONTINUED | OUTPATIENT
Start: 2018-03-03 | End: 2018-03-08 | Stop reason: HOSPADM

## 2018-03-03 RX ORDER — AMOXICILLIN 250 MG
2 CAPSULE ORAL 2 TIMES DAILY PRN
Status: DISCONTINUED | OUTPATIENT
Start: 2018-03-03 | End: 2018-03-08 | Stop reason: HOSPADM

## 2018-03-03 RX ORDER — ONDANSETRON 2 MG/ML
4 INJECTION INTRAMUSCULAR; INTRAVENOUS EVERY 6 HOURS PRN
Status: DISCONTINUED | OUTPATIENT
Start: 2018-03-03 | End: 2018-03-08 | Stop reason: HOSPADM

## 2018-03-03 RX ORDER — POLYETHYLENE GLYCOL 3350 17 G/17G
17 POWDER, FOR SOLUTION ORAL DAILY PRN
Status: DISCONTINUED | OUTPATIENT
Start: 2018-03-03 | End: 2018-03-08 | Stop reason: HOSPADM

## 2018-03-03 RX ORDER — PROCHLORPERAZINE MALEATE 5 MG
5 TABLET ORAL EVERY 6 HOURS PRN
Status: DISCONTINUED | OUTPATIENT
Start: 2018-03-03 | End: 2018-03-08 | Stop reason: HOSPADM

## 2018-03-03 RX ORDER — IOPAMIDOL 755 MG/ML
59 INJECTION, SOLUTION INTRAVASCULAR ONCE
Status: COMPLETED | OUTPATIENT
Start: 2018-03-03 | End: 2018-03-03

## 2018-03-03 RX ORDER — LIDOCAINE 40 MG/G
CREAM TOPICAL
Status: DISCONTINUED | OUTPATIENT
Start: 2018-03-03 | End: 2018-03-08 | Stop reason: HOSPADM

## 2018-03-03 RX ORDER — PROCHLORPERAZINE 25 MG
12.5 SUPPOSITORY, RECTAL RECTAL EVERY 12 HOURS PRN
Status: DISCONTINUED | OUTPATIENT
Start: 2018-03-03 | End: 2018-03-08 | Stop reason: HOSPADM

## 2018-03-03 RX ORDER — MEMANTINE HYDROCHLORIDE 5 MG/1
5 TABLET ORAL 2 TIMES DAILY
Status: DISCONTINUED | OUTPATIENT
Start: 2018-03-03 | End: 2018-03-08 | Stop reason: HOSPADM

## 2018-03-03 RX ORDER — SODIUM CHLORIDE 9 MG/ML
INJECTION, SOLUTION INTRAVENOUS CONTINUOUS
Status: DISCONTINUED | OUTPATIENT
Start: 2018-03-03 | End: 2018-03-03

## 2018-03-03 RX ORDER — SODIUM CHLORIDE, SODIUM LACTATE, POTASSIUM CHLORIDE, CALCIUM CHLORIDE 600; 310; 30; 20 MG/100ML; MG/100ML; MG/100ML; MG/100ML
INJECTION, SOLUTION INTRAVENOUS CONTINUOUS
Status: DISCONTINUED | OUTPATIENT
Start: 2018-03-03 | End: 2018-03-05

## 2018-03-03 RX ORDER — MORPHINE SULFATE 2 MG/ML
2-4 INJECTION, SOLUTION INTRAMUSCULAR; INTRAVENOUS
Status: DISCONTINUED | OUTPATIENT
Start: 2018-03-03 | End: 2018-03-03

## 2018-03-03 RX ORDER — AMOXICILLIN 250 MG
1 CAPSULE ORAL 2 TIMES DAILY PRN
Status: DISCONTINUED | OUTPATIENT
Start: 2018-03-03 | End: 2018-03-08 | Stop reason: HOSPADM

## 2018-03-03 RX ORDER — ONDANSETRON 4 MG/1
4 TABLET, ORALLY DISINTEGRATING ORAL EVERY 6 HOURS PRN
Status: DISCONTINUED | OUTPATIENT
Start: 2018-03-03 | End: 2018-03-08 | Stop reason: HOSPADM

## 2018-03-03 RX ORDER — DONEPEZIL HYDROCHLORIDE 5 MG/1
5 TABLET, FILM COATED ORAL AT BEDTIME
Status: DISCONTINUED | OUTPATIENT
Start: 2018-03-03 | End: 2018-03-08 | Stop reason: HOSPADM

## 2018-03-03 RX ADMIN — SODIUM CHLORIDE, PRESERVATIVE FREE 65 ML: 5 INJECTION INTRAVENOUS at 10:46

## 2018-03-03 RX ADMIN — SODIUM CHLORIDE 250 ML: 9 INJECTION, SOLUTION INTRAVENOUS at 13:47

## 2018-03-03 RX ADMIN — SODIUM CHLORIDE 500 ML: 9 INJECTION, SOLUTION INTRAVENOUS at 09:52

## 2018-03-03 RX ADMIN — Medication 1 MG: at 20:52

## 2018-03-03 RX ADMIN — MEMANTINE HYDROCHLORIDE 5 MG: 5 TABLET, FILM COATED ORAL at 20:52

## 2018-03-03 RX ADMIN — SODIUM CHLORIDE, POTASSIUM CHLORIDE, SODIUM LACTATE AND CALCIUM CHLORIDE: 600; 310; 30; 20 INJECTION, SOLUTION INTRAVENOUS at 18:54

## 2018-03-03 RX ADMIN — DIATRIZOATE MEGLUMINE AND DIATRIZOATE SODIUM 30 ML: 660; 100 SOLUTION ORAL; RECTAL at 16:49

## 2018-03-03 RX ADMIN — SODIUM CHLORIDE 250 ML: 9 INJECTION, SOLUTION INTRAVENOUS at 09:52

## 2018-03-03 RX ADMIN — IOPAMIDOL 59 ML: 755 INJECTION, SOLUTION INTRAVENOUS at 10:46

## 2018-03-03 RX ADMIN — DONEPEZIL HYDROCHLORIDE 5 MG: 5 TABLET, FILM COATED ORAL at 21:02

## 2018-03-03 ASSESSMENT — ENCOUNTER SYMPTOMS
CONSTIPATION: 0
VOMITING: 0
ABDOMINAL PAIN: 1
NAUSEA: 0
WEAKNESS: 1
DIFFICULTY URINATING: 0
DIARRHEA: 0
FATIGUE: 1

## 2018-03-03 NOTE — ED NOTES
Patient drank 1/4 of Gastrografin and is refusing the rest. Xray technician to call surgery and inform them. Dr. Iglesias notified.

## 2018-03-03 NOTE — PHARMACY
"The following home medications were NOT continued on inpatient admission per \"Discontinuation of nonessential home medications during hospitalization\" policy: alendronate    If a therapeutic holiday is deemed inappropriate per the prescriber, please notify the pharmacist regarding the medication order.    The pharmacist is available to answer any questions and/or concerns the patient may have regarding discontinuation of non-essential medications.    Please ensure that these medications are restarted as needed upon discharge via the medication reconciliation discharge process and included on the discharge medication reconciliation report.    Thank you,  Brittany Stephenson, KanD, BCPS  "

## 2018-03-03 NOTE — IP AVS SNAPSHOT
MRN:[id number]                      After Visit Summary   3/3/2018    Anca Alanis    MRN: [id number]           Thank you!     Thank you for choosing [facility] for your care. Our goal is always to provide you with excellent care. Hearing back from our patients is one way we can continue to improve our services. Please take a few minutes to complete the written survey that you may receive in the mail after you visit with us. Thank you!        Patient Information     Date Of Birth          5/2/1924        Designated Caregiver       Most Recent Value    Caregiver    Will someone help with your care after discharge? yes    Name of designated caregiver Kris spouse    Phone number of caregiver [phone]    Caregiver address [address]      About your hospital stay     You were admitted on:  March 3, 2018 You last received care in the:  [address] [facility]    You were discharged on:  March 8, 2018        Reason for your hospital stay       Abdominal Mass                  Who to Call     For medical emergencies, please call 911.  For non-urgent questions about your medical care, please call your primary care provider or clinic, [phone]          Attending Provider     Provider Specialty    Apolinar Iglesias MD Emergency Medicine    [facility] enc[facility] General Surgery       Primary Care Provider Office Phone # Fax #    Rafat Richards [phone]      After Care Instructions     Activity - Ambulate in hallway       Every shift            Activity - Up with nursing assistance           Additional Discharge Instructions       Discharge Instructions  Activity  - No activity restrictions  - Do not operate a motor vehicle while taking narcotic pain medications    Medications  - Do not take any additional Tylenol (acetaminophen) while using a narcotic pain medication which includes acetaminophen  - Do not take more than 4,000mg of  acetaminophen in any 24 hour period, as this can cause liver damage  - Take stool softeners such as Senna or Miralax while you are using narcotics, but stop if you develop diarrhea  - Wean yourself off of narcotic pain medications    Follow-Up:  - Call your primary care provider to touch base regarding your recent admission.     - Call or return sooner than your regularly scheduled visit if you develop any of the following: fever >101.5, uncontrolled pain, uncontrolled nausea or vomiting, as well as increased redness, swelling, or drainage from your wound.   -  A nurse from the General Surgery Clinic will contact you 24 hours, or next business day, after your discharge from the hospital.  If you have questions or to schedule a follow up appointment please call the General Surgery Clinic at [phone].  Call [phone] and ask to speak with the Surgery resident on call if you are having troubles in the evenings, at night, or on the weekend.  -  If you are receiving home care please inform your home care nurse of our contact number.            Advance Diet as Tolerated       Follow this diet upon discharge: Orders Placed This Encounter      Snacks/Supplements Adult: Ensure Plus (Adult); Between Meals      Room Service      Regular Diet Adult            Daily weights       Call Provider for weight gain of more than 2 pounds per day or 5 pounds per week.            Fall precautions           General info for SNF       Length of Stay Estimate: Short Term Care: Estimated # of Days <30  Condition at Discharge: Stable  Level of care:skilled   Rehabilitation Potential: Fair  Admission H&P remains valid and up-to-date: Yes  Recent Chemotherapy: N/A  Use Nursing Home Standing Orders: Yes            Glucose monitor nursing POCT       Before meals and at bedtime            Intake and output       Every shift                  Follow-up Appointments     Follow Up and recommended labs and tests       Follow up with primary care  "provider in 1 week for change in meds follow up.  No follow up labs or test are needed.  Follow up with Dr. Renee in 2 weeks to follow up on pathology results and advise further management.                  Additional Services     Occupational Therapy Adult Consult       Evaluate and treat as clinically indicated.    Reason:  Deconditioning            Physical Therapy Adult Consult       Evaluate and treat as clinically indicated.    Reason:  Deconditioning                  Pending Results     Date and Time Order Name Status Description    3/7/2018 1114 Anaerobic bacterial culture Preliminary     3/7/2018 1114 Fluid Culture Aerobic Bacterial Preliminary     3/7/2018 1114 Cytology Non Gyn In process     3/7/2018 0947 Surgical Path Exam In process     3/5/2018 0915 IR Abdominal Retroperitoneal Biopsy In process             Statement of Approval     Ordered          03/08/18 1216  I have reviewed and agree with all the recommendations and orders detailed in this document.  EFFECTIVE NOW     Approved and electronically signed by:  Angella Patrick MD             Admission Information     Date & Time Provider Department Dept. Phone    3/3/2018 Radha Mckeon MD [address] [facility] [place] [phone]      Your Vitals Were     Blood Pressure Pulse Temperature Respirations Height Weight    148/62 (BP Location: Left arm) 80 98.8  F (37.1  C) (Oral) 16 1.626 m (5' 4\") 45.4 kg (100 lb)    Pulse Oximetry BMI (Body Mass Index)                96% 17.16 kg/m2          [employer] Information     [employer] lets you send messages to your doctor, view your test results, renew your prescriptions, schedule appointments and more. To sign up, go to [email or web address] . Click on \"Log in\" on the left side of the screen, which will take you to the Welcome page. Then click on \"Sign up Now\" on the right side of the page.     You will be asked to enter the access code listed below, as well as some personal information. Please " follow the directions to create your username and password.     Your access code is: [email or web address]  Expires: 2018 12:56 PM     Your access code will  in 90 days. If you need help or a new code, please call your [facility] clinic or [phone].        Care EveryWhere ID     This is your Care EveryWhere ID. This could be used by other organizations to access your [facility] medical records  [id number]        Equal Access to Services     [facility] [facility] AH: Hadii aad ku hadasho Soomaali, waaxda luqadaha, qaybta kaalmada adeegyada, waxay morenitain hayshiran víctor burgos . So [facility] [phone].    ATENCIÓN: Si habla español, tiene a gillette disposición servicios gratuitos de asistencia lingüística. Llame al [phone].    We comply with applicable federal civil rights laws and [place] laws. We do not discriminate on the basis of race, color, national origin, age, disability, sex, sexual orientation, or gender identity.               Review of your medicines      START taking        Dose / Directions    acetaminophen 325 MG tablet   Commonly known as:  TYLENOL   Used for:  Abdominal mass, RLQ (right lower quadrant)        Dose:  650 mg   Take 2 tablets (650 mg) by mouth At Bedtime   Quantity:  100 tablet   Refills:  0       amoxicillin-clavulanate 500-125 MG per tablet   Commonly known as:  AUGMENTIN   Indication:  Infection Within the Abdomen   Used for:  Abdominal mass, RLQ (right lower quadrant)        Dose:  1 tablet   Take 1 tablet by mouth every 12 hours for 7 days   Quantity:  14 tablet   Refills:  0       bisacodyl 10 MG Suppository   Commonly known as:  DULCOLAX   Used for:  Abdominal mass, RLQ (right lower quadrant)        Dose:  10 mg   Place 1 suppository (10 mg) rectally daily as needed for constipation   Quantity:  30 suppository   Refills:  0       melatonin 3 MG tablet   Used for:  Abdominal mass, unspecified abdominal location        Dose:  3 mg   Take 1 tablet (3 mg) by mouth At Bedtime   Refills:  0        polyethylene glycol Packet   Commonly known as:  MIRALAX/GLYCOLAX   Used for:  Abdominal mass, RLQ (right lower quadrant)        Dose:  17 g   Take 17 g by mouth daily as needed for constipation   Quantity:  7 packet   Refills:  0         CONTINUE these medicines which may have CHANGED, or have new prescriptions. If we are uncertain of the size of tablets/capsules you have at home, strength may be listed as something that might have changed.        Dose / Directions    alendronate 70 MG tablet   Commonly known as:  FOSAMAX   This may have changed:  additional instructions   Used for:  Osteoporosis        Dose:  70 mg   Take 1 tablet (70 mg) by mouth every 7 days   Quantity:  12 tablet   Refills:  3         CONTINUE these medicines which have NOT CHANGED        Dose / Directions    CALCIUM + D PO        315 mg-200 IU tablet. Take 0.5 tablet by mouth daily with meals.   Refills:  0       donepezil 5 MG tablet   Commonly known as:  ARIcept   Used for:  Late onset Alzheimer's disease without behavioral disturbance        Dose:  5 mg   Take 1 tablet (5 mg) by mouth At Bedtime   Quantity:  90 tablet   Refills:  3       memantine 5 MG tablet   Commonly known as:  NAMENDA   Used for:  Late onset Alzheimer's disease without behavioral disturbance        Dose:  5 mg   Take 1 tablet (5 mg) by mouth 2 times daily   Quantity:  180 tablet   Refills:  3       MULTIVITAMIN PO        Dose:  0.5 tablet   Take 0.5 tablets by mouth daily   Refills:  0            Where to get your medicines      Some of these will need a paper prescription and others can be bought over the counter. Ask your nurse if you have questions.     You don't need a prescription for these medications     acetaminophen 325 MG tablet    amoxicillin-clavulanate 500-125 MG per tablet    bisacodyl 10 MG Suppository    melatonin 3 MG tablet    polyethylene glycol Packet                Protect others around you: Learn how to safely use, store and throw away your  medicines at www.disposemymeds.org.        ANTIBIOTIC INSTRUCTION     You've Been Prescribed an Antibiotic - Now What?  Your healthcare team thinks that you or your loved one might have an infection. Some infections can be treated with antibiotics, which are powerful, life-saving drugs. Like all medications, antibiotics have side effects and should only be used when necessary. There are some important things you should know about your antibiotic treatment.      Your healthcare team may run tests before you start taking an antibiotic.    Your team may take samples (e.g., from your blood, urine or other areas) to run tests to look for bacteria. These test can be important to determine if you need an antibiotic at all and, if you do, which antibiotic will work best.      Within a few days, your healthcare team might change or even stop your antibiotic.    Your team may start you on an antibiotic while they are working to find out what is making you sick.    Your team might change your antibiotic because test results show that a different antibiotic would be better to treat your infection.    In some cases, once your team has more information, they learn that you do not need an antibiotic at all. They may find out that you don't have an infection, or that the antibiotic you're taking won't work against your infection. For example, an infection caused by a virus can't be treated with antibiotics. Staying on an antibiotic when you don't need it is more likely to be harmful than helpful.      You may experience side effects from your antibiotic.    Like all medications, antibiotics have side effects. Some of these can be serious.    Let you healthcare team know if you have any known allergies when you are admitted to the hospital.    One significant side effect of nearly all antibiotics is the risk of severe and sometimes deadly diarrhea caused by Clostridium difficile (C. Difficile). This occurs when a person takes  antibiotics because some good germs are destroyed. Antibiotic use allows C. diificile to take over, putting patients at high risk for this serious infection.    As a patient or caregiver, it is important to understand your or your loved one's antibiotic treatment. It is especially important for caregivers to speak up when patients can't speak for themselves. Here are some important questions to ask your healthcare team.    What infection is this antibiotic treating and how do you know I have that infection?    What side effects might occur from this antibiotic?    How long will I need to take this antibiotic?    Is it safe to take this antibiotic with other medications or supplements (e.g., vitamins) that I am taking?     Are there any special directions I need to know about taking this antibiotic? For example, should I take it with food?    How will I be monitored to know whether my infection is responding to the antibiotic?    What tests may help to make sure the right antibiotic is prescribed for me?      Information provided by:  www.cdc.gov/getsmart  U.S. Department of Health and Human Services  Centers for disease Control and Prevention  National Center for Emerging and Zoonotic Infectious Diseases  Division of Healthcare Quality Promotion             Medication List: This is a list of all your medications and when to take them. Check marks below indicate your daily home schedule. Keep this list as a reference.      Medications           Morning Afternoon Evening Bedtime As Needed    acetaminophen 325 MG tablet   Commonly known as:  TYLENOL   Take 2 tablets (650 mg) by mouth At Bedtime   Last time this was given:  650 mg on 3/7/2018  8:49 PM                                alendronate 70 MG tablet   Commonly known as:  FOSAMAX   Take 1 tablet (70 mg) by mouth every 7 days                                amoxicillin-clavulanate 500-125 MG per tablet   Commonly known as:  AUGMENTIN   Take 1 tablet by mouth every  12 hours for 7 days   Last time this was given:  1 tablet on 3/8/2018  1:40 PM                                bisacodyl 10 MG Suppository   Commonly known as:  DULCOLAX   Place 1 suppository (10 mg) rectally daily as needed for constipation                                CALCIUM + D PO   315 mg-200 IU tablet. Take 0.5 tablet by mouth daily with meals.                                donepezil 5 MG tablet   Commonly known as:  ARIcept   Take 1 tablet (5 mg) by mouth At Bedtime   Last time this was given:  5 mg on 3/7/2018  8:49 PM                                melatonin 3 MG tablet   Take 1 tablet (3 mg) by mouth At Bedtime   Last time this was given:  3 mg on 3/7/2018  8:49 PM                                memantine 5 MG tablet   Commonly known as:  NAMENDA   Take 1 tablet (5 mg) by mouth 2 times daily   Last time this was given:  5 mg on 3/8/2018  8:12 AM                                MULTIVITAMIN PO   Take 0.5 tablets by mouth daily                                polyethylene glycol Packet   Commonly known as:  MIRALAX/GLYCOLAX   Take 17 g by mouth daily as needed for constipation

## 2018-03-03 NOTE — IP AVS SNAPSHOT
[address] [address]    Phone:  [phone]                                       After Visit Summary   3/3/2018    Anca Alanis    MRN: [id number]           After Visit Summary Signature Page     I have received my discharge instructions, and my questions have been answered. I have discussed any challenges I see with this plan with the nurse or doctor.    ..........................................................................................................................................  Patient/Patient Representative Signature      ..........................................................................................................................................  Patient Representative Print Name and Relationship to Patient    ..................................................               ................................................  Date                                            Time    ..........................................................................................................................................  Reviewed by Signature/Title    ...................................................              ..............................................  Date                                                            Time

## 2018-03-03 NOTE — ED NOTES
"Triage Assessment & Note:    /49  Pulse 70  Temp 97.7  F (36.5  C) (Oral)  Resp 16  Ht 1.626 m (5' 4\")  Wt 44.3 kg (97 lb 10.6 oz)  SpO2 94%  Breastfeeding? No  BMI 16.76 kg/m2      Patient presents with: Pt comes to triage with friends for c/o of RLQ abdominal pain.  Pt reports this pain for the past 3-4 days with lack of appetite.  Pt denies fever, cough, or SOB.      Home Treatments/Remedies: home medication    Febrile / Afebrile: afebrile    Duration of C/o: 3-4 days     Siomara Kincaid RN  March 3, 2018        "

## 2018-03-03 NOTE — ED NOTES
[facility], [place]   ED Nurse to Floor Handoff     Anca Alanis is a 89 year old female who speaks English and lives with a spouse,  in a home  They arrived in the ED by car from home    ED Chief Complaint: Abdominal Pain    ED Dx;   Final diagnoses:   Abdominal mass, unspecified abdominal location         Needed?: No    Allergies:   Allergies   Allergen Reactions     Ciprofloxacin      Sulfa Drugs    .  Past Medical Hx:   Past Medical History:   Diagnosis Date     Cataracts, both eyes      Glaucoma suspect       Baseline Mental status: mild dementia  Current Mental Status changes: at basesline    Infection: No  Sepsis suspected: No  Isolation type: No active isolations     Activity level - Baseline/Home:  Stand with Assist  Activity Level - Current:   Stand with Assist    Bariatric equipment needed?: No    In the ED these meds were given:   Medications   sodium chloride 0.9% infusion (250 mLs Intravenous New Bag 3/3/18 1347)   morphine (PF) injection 2-4 mg (not administered)   ondansetron (ZOFRAN) injection 4 mg (not administered)   diatrizoate meglumine-sodium (GASTROGRAFIN; GASTROVIEW) 66-10 % solution 120 mL (not administered)   0.9% sodium chloride BOLUS (0 mLs Intravenous Stopped 3/3/18 1246)   iopamidol (ISOVUE-370) solution 59 mL (59 mLs Intravenous Given 3/3/18 1046)   sodium chloride 0.9 % bag 500mL for CT scan flush use (65 mLs Intravenous Given 3/3/18 1046)       Drips running?  No    Home pump or pre-existing LDA's present? No    Labs results:   Labs Ordered and Resulted from Time of ED Arrival Up to the Time of Departure from the ED   CBC WITH PLATELETS DIFFERENTIAL - Abnormal; Notable for the following:        Result Value    WBC 11.3 (*)     Absolute Neutrophil 9.9 (*)     Absolute Lymphocytes 0.4 (*)     All other components within normal limits   BASIC METABOLIC PANEL - Abnormal; Notable for the following:     Glucose 116 (*)     Urea Nitrogen 33 (*)      All other components within normal limits   INR - Abnormal; Notable for the following:     INR 1.27 (*)     All other components within normal limits   ROUTINE UA WITH MICROSCOPIC - Abnormal; Notable for the following:     Ketones Urine 40 (*)     Protein Albumin Urine 10 (*)     Leukocyte Esterase Urine Small (*)     RBC Urine 4 (*)     Mucous Urine Present (*)     All other components within normal limits   PARTIAL THROMBOPLASTIN TIME   PERIPHERAL IV CATHETER   FREE WATER   URINE CULTURE AEROBIC BACTERIAL       Imaging Studies:   Recent Results (from the past 24 hour(s))   CT Abdomen Pelvis w Contrast    Narrative    EXAMINATION: CT ABDOMEN PELVIS W CONTRAST  3/3/2018 10:58 AM      CLINICAL HISTORY: rlq mass, pain, possible hernia;     COMPARISON: CT 6/27/2005    PROCEDURE COMMENTS: CT of the abdomen was performed with 59cc of  Isovue 370 intravenous contrast. Coronal and sagittal reformatted  images were obtained.    FINDINGS:    Abdomen and pelvis:  Conglomeration of loops of bowel in the right lower quadrant which  appears to protrude anteriorly along the abdominal wall musculature  though does not appear to herniate through a muscular/fascial defect.  These loops of bowel do not appear abnormally distended, or  demonstrate abnormal enhancement or wall thickening. No pneumatosis.  No inflammatory changes or hyperemia in this region. There is no  proximal upstream dilatation of small bowel. Findings likely suggest  weakening of the anterior abdominal wall musculature with some  prominent outpouching. The right inguinal canal is without evidence of  herniated bowel. Metallic artifact overlies the lower abdomen and  somewhat obscures evaluation, particularly around image 260 of series  3. There is extensive colonic diverticulosis without diverticulitis.  Appendix is not visualized.    No focal abnormality in the liver, spleen, gallbladder, adrenal  glands, or pancreas. Mildly prominent pancreatic duct.  Mild  intrahepatic bile duct dilation. Kidneys are symmetric without  hydronephrosis or nephrolithiasis. The bladder is relatively  decompressed. Gynecologic structures are unremarkable. There is trace  fluid in the pelvis. Tortuous abdominal aorta with scattered  calcifications though no aneurysmal dilatation. Portal vein is patent.  No lymphadenopathy.    Lower thorax: Atelectasis in the lung bases, right middle lobe and  lingula. Interlobular septal thickening with subpleural reticulation.  Coronary artery calcifications. Mitral annulus calcifications. Aortic  valve calcifications. No pericardial effusion. Mild cardiomegaly.    Osseous structures:   There is no acute fracture or suspicious bony lesion. Severe  multilevel degenerative changes of the lumbar spine. Convex right  lumbar scoliotic curvature. Diffuse osteopenia. Degenerative changes  of the hips.      Impression    IMPRESSION:  1. Conglomeration of nondilated small bowel loops in the right lower  quadrant which appears to outpouch along focal segment of the low  midline abdominal wall musculature which appears lax but the bowel  does not herniate through the abdominal wall. There is no proximal  dilatation of small bowel to suggest obstruction. No loops of bowel  are seen within the inguinal canal.  2. Colonic diverticulosis without diverticulitis.  3. Extensive degenerative changes of the lumbar spine. Diffuse  osteopenia.  4. Mild cardiomegaly and interstitial pulmonary edema.    Dr. Lozano spoke with Dr. Iglesias of the emergency department at  approximately 11:30 regarding these results, which which she  verbalized understanding    I have personally reviewed the examination and initial interpretation  and I agree with the findings.    ELLY HENRY MD ([place])   US Abdomen Pelvis Duplex Limited   Result Value    Radiologist flags (Urgent)     Findings suspicious for right lower quadrant localized    Narrative    EXAMINATION: US ABDOMEN OR PELVIS  "DOPPLER LIMITED, 3/3/2018 3:23 PM     COMPARISON: CT 3/3/2018    HISTORY: RLQ abdominal wall mass;     FINDINGS: Doppler ultrasound of right lower quadrant swelling.    There appear to be multiple hypoechoic structures which appear to be  bowel loops, with stasis of intraluminal contents. No peristalsis was  identified. Normal peristalsis is identified in the left lower  quadrant. There appears to be normal blood flow to the wall.        Impression    IMPRESSION:  Right lower quadrant soft tissue swelling, evaluated with previous CT,  appear to be multiple bowel loops which are aperistaltic with stasis  of intraluminal contents. Cannot exclude obstruction. Gastrografin  study may be considered for further evaluation. Differential diagnosis  for such an appearance includes a necrotic mass, which is however  considered less likely.    [Urgent Result: Findings suspicious for right lower quadrant localized  ileus versus obstruction. Less likely necrotic mass. Consider  Gastrografin study for further evaluation.]    Finding was identified on 3/3/2018 3:42 PM.     Dr. Mckeon was contacted by Dr. Jv Luz at 3/3/2018 3:42  PM and verbalized understanding of the urgent finding.      I have personally reviewed the examination and initial interpretation  and I agree with the findings.    LENY MAJOR MD       Recent vital signs:   /58  Pulse 70  Temp 97.7  F (36.5  C) (Oral)  Resp 16  Ht 1.626 m (5' 4\")  Wt 44.3 kg (97 lb 10.6 oz)  SpO2 95%  Breastfeeding? No  BMI 16.76 kg/m2    Cardiac Rhythm: Normal Sinus  Pt needs tele? No  Skin/wound Issues: None    Code Status: DNR / DNI    Pain control: fair    Nausea control: pt had none    Abnormal labs/tests/findings requiring intervention: abdominal xray shows ileus vs obstruction alternate diagnosis is necrotic mass (less likely)    Family present during ED course? Yes   Family Comments/Social Situation comments: boyfriend at bedside    Tasks needing " completion: None    Tara Marks RN  [facility]-- [phone] [place] [facility]  [phone] [facility] ED

## 2018-03-03 NOTE — IP AVS SNAPSHOT
"    [address] [facility]: [phone]                                              INTERAGENCY TRANSFER FORM - PHYSICIAN ORDERS   3/3/2018                    Hospital Admission Date: 3/3/2018  MAREN BRADLEY   : 1924  Sex: Female        Attending Provider: Radha Mckeon MD     Allergies:  Ciprofloxacin, Sulfa Drugs    Infection:  None   Service:  SURGERY    Ht:  1.626 m (5' 4\")   Wt:  45.4 kg (100 lb)   Admission Wt:  44.3 kg (97 lb 10.6 oz)    BMI:  17.16 kg/m 2   BSA:  1.43 m 2            Patient PCP Information     Provider PCP Type    Rafat Richards MD General      ED Clinical Impression     Diagnosis Description Comment Added By Time Added    Abdominal mass, unspecified abdominal location [R19.00] Abdominal mass, unspecified abdominal location [R19.00]  Apolinar Iglesias MD 3/3/2018  1:28 PM      Hospital Problems as of 3/8/2018              Priority Class Noted POA    Abdominal pain Medium  3/3/2018 Yes      Non-Hospital Problems as of 3/8/2018              Priority Class Noted    Anxiety state Medium  Unknown    Backache Medium  Unknown    Dysthymic disorder Medium  Unknown    Hearing loss Medium  Unknown    Spinal stenosis, lumbar region, without neurogenic claudication Medium  Unknown    Osteoarthritis of multiple joints Medium  Unknown    Osteoporosis Medium  Unknown    Scoliosis (and kyphoscoliosis), idiopathic Medium  Unknown    Health Care Home Medium  Unknown    Sleep disorder Medium  2014    Moderate stage glaucoma - Left Eye Medium  2015    Senile cataracts of both eyes Medium  2015    Late onset Alzheimer's disease without behavioral disturbance Medium  2/10/2016      Code Status History     Date Active Date Inactive Code Status Order ID Comments User Context    3/8/2018 12:16 PM  Full Code [id number]  Angella Patrick MD Outpatient    3/3/2018  4:57 PM 3/8/2018 12:16 PM Full Code [id number]  Luisito Sparks MD Inpatient         Medication Review "      START taking        Dose / Directions Comments    acetaminophen 325 MG tablet   Commonly known as:  TYLENOL   Used for:  Abdominal mass, RLQ (right lower quadrant)        Dose:  650 mg   Take 2 tablets (650 mg) by mouth At Bedtime   Quantity:  100 tablet   Refills:  0        amoxicillin-clavulanate 500-125 MG per tablet   Commonly known as:  AUGMENTIN   Indication:  Infection Within the Abdomen   Used for:  Abdominal mass, RLQ (right lower quadrant)        Dose:  1 tablet   Take 1 tablet by mouth every 12 hours for 7 days   Quantity:  14 tablet   Refills:  0        bisacodyl 10 MG Suppository   Commonly known as:  DULCOLAX   Used for:  Abdominal mass, RLQ (right lower quadrant)        Dose:  10 mg   Place 1 suppository (10 mg) rectally daily as needed for constipation   Quantity:  30 suppository   Refills:  0        melatonin 3 MG tablet   Used for:  Abdominal mass, unspecified abdominal location        Dose:  3 mg   Take 1 tablet (3 mg) by mouth At Bedtime   Refills:  0        polyethylene glycol Packet   Commonly known as:  MIRALAX/GLYCOLAX   Used for:  Abdominal mass, RLQ (right lower quadrant)        Dose:  17 g   Take 17 g by mouth daily as needed for constipation   Quantity:  7 packet   Refills:  0          CONTINUE these medications which may have CHANGED, or have new prescriptions. If we are uncertain of the size of tablets/capsules you have at home, strength may be listed as something that might have changed.        Dose / Directions Comments    alendronate 70 MG tablet   Commonly known as:  FOSAMAX   This may have changed:  additional instructions   Used for:  Osteoporosis        Dose:  70 mg   Take 1 tablet (70 mg) by mouth every 7 days   Quantity:  12 tablet   Refills:  3          CONTINUE these medications which have NOT CHANGED        Dose / Directions Comments    CALCIUM + D PO        315 mg-200 IU tablet. Take 0.5 tablet by mouth daily with meals.   Refills:  0        donepezil 5 MG tablet    Commonly known as:  ARIcept   Used for:  Late onset Alzheimer's disease without behavioral disturbance        Dose:  5 mg   Take 1 tablet (5 mg) by mouth At Bedtime   Quantity:  90 tablet   Refills:  3        memantine 5 MG tablet   Commonly known as:  NAMENDA   Used for:  Late onset Alzheimer's disease without behavioral disturbance        Dose:  5 mg   Take 1 tablet (5 mg) by mouth 2 times daily   Quantity:  180 tablet   Refills:  3        MULTIVITAMIN PO        Dose:  0.5 tablet   Take 0.5 tablets by mouth daily   Refills:  0                Summary of Visit     Reason for your hospital stay       Abdominal Mass             After Care     Activity - Ambulate in hallway       Every shift       Activity - Up with nursing assistance           Additional Discharge Instructions       Discharge Instructions  Activity  - No activity restrictions  - Do not operate a motor vehicle while taking narcotic pain medications    Medications  - Do not take any additional Tylenol (acetaminophen) while using a narcotic pain medication which includes acetaminophen  - Do not take more than 4,000mg of acetaminophen in any 24 hour period, as this can cause liver damage  - Take stool softeners such as Senna or Miralax while you are using narcotics, but stop if you develop diarrhea  - Wean yourself off of narcotic pain medications    Follow-Up:  - Call your primary care provider to touch base regarding your recent admission.     - Call or return sooner than your regularly scheduled visit if you develop any of the following: fever >101.5, uncontrolled pain, uncontrolled nausea or vomiting, as well as increased redness, swelling, or drainage from your wound.   -  A nurse from the [facility] will contact you 24 hours, or next business day, after your discharge from the hospital.  If you have questions or to schedule a follow up appointment please call the [facility] at [phone].  Call [phone] and ask to  speak with the Surgery resident on call if you are having troubles in the evenings, at night, or on the weekend.  -  If you are receiving home care please inform your home care nurse of our contact number.       Advance Diet as Tolerated       Follow this diet upon discharge: Orders Placed This Encounter      Snacks/Supplements Adult: Ensure Plus (Adult); Between Meals      Room Service      Regular Diet Adult       Daily weights       Call Provider for weight gain of more than 2 pounds per day or 5 pounds per week.       Fall precautions           General info for SNF       Length of Stay Estimate: Short Term Care: Estimated # of Days <30  Condition at Discharge: Stable  Level of care:skilled   Rehabilitation Potential: Fair  Admission H&P remains valid and up-to-date: Yes  Recent Chemotherapy: N/A  Use Nursing Home Standing Orders: Yes       Glucose monitor nursing POCT       Before meals and at bedtime       Intake and output       Every shift             Referrals     Occupational Therapy Adult Consult       Evaluate and treat as clinically indicated.    Reason:  Deconditioning       Physical Therapy Adult Consult       Evaluate and treat as clinically indicated.    Reason:  Deconditioning             Follow-Up Appointment Instructions     Future Labs/Procedures    Follow Up and recommended labs and tests     Comments:    Follow up with primary care provider in 1 week for change in meds follow up.  No follow up labs or test are needed.  Follow up with Dr. Renee in 2 weeks to follow up on pathology results and advise further management.      Follow-Up Appointment Instructions     Follow Up and recommended labs and tests       Follow up with primary care provider in 1 week for change in meds follow up.  No follow up labs or test are needed.  Follow up with Dr. Renee in 2 weeks to follow up on pathology results and advise further management.             Statement of Approval     Ordered          03/08/18 1216  I  have reviewed and agree with all the recommendations and orders detailed in this document.  EFFECTIVE NOW     Approved and electronically signed by:  Angella Patrick MD

## 2018-03-03 NOTE — IP AVS SNAPSHOT
"    UNIT [facility]B [facility]: [phone]                                              INTERAGENCY TRANSFER FORM - LAB / IMAGING / EKG / EMG RESULTS   3/3/2018                    Hospital Admission Date: 3/3/2018  MAREN BRADLEY   : 1924  Sex: Female        Attending Provider: Radha Mckeon MD     Allergies:  Ciprofloxacin, Sulfa Drugs    Infection:  None   Service:  SURGERY    Ht:  1.626 m (5' 4\")   Wt:  45.4 kg (100 lb)   Admission Wt:  44.3 kg (97 lb 10.6 oz)    BMI:  17.16 kg/m 2   BSA:  1.43 m 2            Patient PCP Information     Provider PCP Type    Rafat Richards MD General         Lab Results - 3 Days      Cytology Non Gyn [[id number]]  Resulted: 18 1037, Result status: In process    Ordering provider: Radha Mckeon MD  18 1114 Resulting lab: [facility]    Specimen Information    Type Source Collected On   Cytology  18 1050            Fluid Culture Aerobic Bacterial [[id number]] (Abnormal)  Resulted: 18 0836, Result status: Preliminary result    Ordering provider: Radha Mckeon MD  18 1114 Resulting lab: INFECTIOUS DISEASE DIAGNOSTIC LABORATORY    Specimen Information    Type Source Collected On   Pelvic Pelvis 18 1050   Comment:  Fluid          Components       Value Reference Range Flag Lab   Specimen Description Pelvic Fluid      Culture Micro --  A 225   Result:         Moderate growth  Non lactose fermenting gram negative rods     Culture Micro Culture in progress   225   Result:              Anaerobic bacterial culture [[id number]]  Resulted: 18 0757, Result status: Preliminary result    Ordering provider: Radha Mckeon MD  18 1114 Resulting lab: INFECTIOUS DISEASE DIAGNOSTIC LABORATORY    Specimen Information    Type Source Collected On   Pelvic Pelvis 18 1050   Comment:  Fluid          Components       Value Reference Range Flag Lab   Specimen Description Pelvic Fluid      Special " Requests Received in anaerobic tubes.   75   Culture Micro Culture negative monitoring continues   225            Gram stain [[id number]] (Abnormal)  Resulted: 03/07/18 1514, Result status: Final result    Ordering provider: Radha Mckeon MD  03/07/18 1114 Resulting lab: MICRO RAPID TESTING LAB    Specimen Information    Type Source Collected On   Pelvic  03/07/18 1050   Comment:  Fluid          Components       Value Reference Range Flag Lab   Specimen Description Pelvic Fluid      Gram Stain --  A 226   Result:         Many  Gram negative rods     Gram Stain --  A 226   Result:         Many  Gram positive cocci     Gram Stain --   226   Result:         Many  WBC'S seen  predominantly PMN's              Surgical Path Exam [[id number]]  Resulted: 03/07/18 1451, Result status: In process    Ordering provider: Radha Mckeon MD  03/07/18 0947 Resulting lab: [facility]    Specimen Information    Type Source Collected On   Biopsy  03/07/18 1040            Cancer antigen 19-9 [[id number]]  Resulted: 03/07/18 1310, Result status: Final result    Ordering provider: Akila Forrest MD  03/05/18 1457 Resulting lab: [facility]    Specimen Information    Type Source Collected On   Blood  03/05/18 1743          Components       Value Reference Range Flag Lab   Cancer Antigen 19-9 4 0 - 37 U/mL  51   Comment:         (Note)  INTERPRETIVE INFORMATION: Cancer Antigen-GI (CA 19-9)  This test uses Roche CA 19-9 electrochemiluminescent   immunoassay. Results obtained with different test methods   or kits cannot be used interchangeably. CA 19-9 value is   useful in monitoring pancreatic, hepatobiliary, gastric,   hepatocellular, and colorectal cancer. CA 19-9 value,   regardless of level, should not be interpreted as absolute   evidence of the presence or absence of malignant disease.  Performed by [employer],  [address] [place] [address] [phone]  [email or web address],  Gerald Mojica MD, Lab. Director              CBC with platelets differential [[id number]] (Abnormal)  Resulted: 03/07/18 0729, Result status: Final result    Ordering provider: Radha Mckeon MD  03/07/18 0233 Resulting lab: [facility]    Specimen Information    Type Source Collected On   Blood  03/07/18 0720          Components       Value Reference Range Flag Lab   WBC 6.8 4.0 - 11.0 10e9/L  51   RBC Count 3.22 3.8 - 5.2 10e12/L L 51   Hemoglobin 10.1 11.7 - 15.7 g/dL L 51   Hematocrit 31.3 35.0 - 47.0 % L 51   MCV 97 78 - 100 fl  51   MCH 31.4 26.5 - 33.0 pg  51   MCHC 32.3 31.5 - 36.5 g/dL  51   RDW 13.2 10.0 - 15.0 %  51   Platelet Count 238 150 - 450 10e9/L  51   Diff Method Automated Method   51   % Neutrophils 77.0 %  51   % Lymphocytes 10.3 %  51   % Monocytes 9.1 %  51   % Eosinophils 2.4 %  51   % Basophils 0.3 %  51   % Immature Granulocytes 0.9 %  51   Nucleated RBCs 0 0 /100  51   Absolute Neutrophil 5.2 1.6 - 8.3 10e9/L  51   Absolute Lymphocytes 0.7 0.8 - 5.3 10e9/L L 51   Absolute Monocytes 0.6 0.0 - 1.3 10e9/L  51   Absolute Eosinophils 0.2 0.0 - 0.7 10e9/L  51   Absolute Basophils 0.0 0.0 - 0.2 10e9/L  51   Abs Immature Granulocytes 0.1 0 - 0.4 10e9/L  51   Absolute Nucleated RBC 0.0   51            INR [[id number]] (Abnormal)  Resulted: 03/07/18 0355, Result status: Final result    Ordering provider: Radha Mckeon MD  03/07/18 0233 Resulting lab: [facility]    Specimen Information    Type Source Collected On   Blood  03/07/18 0322          Components       Value Reference Range Flag Lab   INR 1.24 0.86 - 1.14 H 51            Prealbumin [[id number]] (Abnormal)  Resulted: 03/06/18 0951, Result status: Final result    Ordering provider: Akila Forrest MD  03/05/18 1502 Resulting lab: [facility]    Specimen Information    Type Source Collected On   Blood  03/05/18 7376           Components       Value Reference Range Flag Lab   Prealbumin 4 15 - 45 mg/dL L 51             [[id number]]  Resulted: 03/05/18 1907, Result status: Final result    Ordering provider: Akila Forrest MD  03/05/18 1457 Resulting lab: [facility]    Specimen Information    Type Source Collected On   Blood  03/05/18 1743          Components       Value Reference Range Flag Lab    29 0 - 30 U/mL  51   Comment:  Assay Method:  Chemiluminescence using Siemens Centaur XP            CEA [[id number]]  Resulted: 03/05/18 1838, Result status: Final result    Ordering provider: Akila Forrest MD  03/05/18 1457 Resulting lab: [facility]    Specimen Information    Type Source Collected On   Blood  03/05/18 1743          Components       Value Reference Range Flag Lab   CEA <0.5 0 - 2.5 ug/L  51   Comment:  Assay Method:  Chemiluminescence using Siemens Centaur XP            Albumin level [[id number]] (Abnormal)  Resulted: 03/05/18 1809, Result status: Final result    Ordering provider: Akila Forrest MD  03/05/18 1502 Resulting lab: [facility]    Specimen Information    Type Source Collected On   Blood  03/05/18 1743          Components       Value Reference Range Flag Lab   Albumin 1.8 3.4 - 5.0 g/dL L 51            INR [[id number]] (Abnormal)  Resulted: 03/05/18 1006, Result status: Final result    Ordering provider: Bo Wayne MD  03/05/18 0901 Resulting lab: [facility]    Specimen Information    Type Source Collected On   Blood  03/05/18 0941          Components       Value Reference Range Flag Lab   INR 1.30 0.86 - 1.14 H 51            CBC with platelets differential [[id number]] (Abnormal)  Resulted: 03/05/18 0957, Result status: Final result    Ordering provider: Bo Wayne MD  03/05/18 0922 Resulting lab: [facility]    Specimen Information     Type Source Collected On   Blood  03/05/18 0941          Components       Value Reference Range Flag Lab   WBC 11.1 4.0 - 11.0 10e9/L H 51   RBC Count 3.30 3.8 - 5.2 10e12/L L 51   Hemoglobin 10.4 11.7 - 15.7 g/dL L 51   Hematocrit 32.1 35.0 - 47.0 % L 51   MCV 97 78 - 100 fl  51   MCH 31.5 26.5 - 33.0 pg  51   MCHC 32.4 31.5 - 36.5 g/dL  51   RDW 13.1 10.0 - 15.0 %  51   Platelet Count 290 150 - 450 10e9/L  51   Diff Method Automated Method   51   % Neutrophils 85.9 %  51   % Lymphocytes 8.4 %  51   % Monocytes 5.0 %  51   % Eosinophils 0.3 %  51   % Basophils 0.1 %  51   % Immature Granulocytes 0.3 %  51   Nucleated RBCs 0 0 /100  51   Absolute Neutrophil 9.6 1.6 - 8.3 10e9/L H 51   Absolute Lymphocytes 0.9 0.8 - 5.3 10e9/L  51   Absolute Monocytes 0.6 0.0 - 1.3 10e9/L  51   Absolute Eosinophils 0.0 0.0 - 0.7 10e9/L  51   Absolute Basophils 0.0 0.0 - 0.2 10e9/L  51   Abs Immature Granulocytes 0.0 0 - 0.4 10e9/L  51   Absolute Nucleated RBC 0.0   51            Testing Performed By     Lab - Abbreviation Name Director Address Valid Date Range    51 - Unknown [facility] Unknown [address] [facility] [address] 12/31/14 1010 - Present    75 - Unknown [facility] Unknown [address] [facility] [address] 01/15/15 1019 - Present    88 - Unknown [facility] Unknown Unknown 10/30/02 0000 - Present    225 - Unknown INFECTIOUS DISEASE DI[facility]NOSTIC LABORATORY Unknown [address] [facility] [address] 12/19/14 0954 - Present    226 - Unknown MICRO RAPID TESTING LAB Unknown [address] [facility] [address] 12/19/14 0955 - Present            Unresulted Labs     None         Imaging Results - 3 Days      IR Abdominal Retroperitoneal Biopsy [[id number]]  Resulted: 03/07/18 1546, Result status: Final result    Ordering provider: Tyesha Gay APRN CNP  03/06/18 1559 Resulted by: Raj Arzola PA-C    Performed: 03/07/18 1009 -  03/07/18 1107 Resulting lab: RADIOLOGY RESULTS    Narrative:       Anca Alanis  MRN: [id number]    PRE-OPERATIVE DIAGNOSIS:  Pelvic mass    POST-OPERATIVE DIAGNOSIS:  Same      PROCEDURE:  ultrasound guided biopsy and aspiration of RIGHT lower  abdomen/pelvic mass      Impression:       IMPRESSION:  Completed ultrasound guided biopsy and aspiration of  RIGHT lower abdomen/pelvic mass. Concern for mucinous cancer discussed  with surgical team and report is that team/family wished to proceed  for tissue diagnosis. An area that appeared extra-peritoneal on CT/US  was targeted for sampling. Needle was placed into collection and core  biopsy was attempted, yielding uncertain solid fragments, four  attempts at core biopsy were made. Through the introducer cannula  aspiration was performed with return of opaque, purulent appearing,  foul-smelling viscous fluid. Dx: Pelvic mass. Dariusz. LOCAL    ----------    CLINICAL HISTORY:  Pelvic mass    PERFORMED BY:  ANAM Kitchen PA-C (Interventional  Radiology)    CONSENT:  The patient and spouse understood the limitations,  alternatives, and risks of the procedure and agreed to the procedure.   Written informed consent was obtained and is documented in the patient  record.    MEDICATIONS:  1% lidocaine was used for local anesthesia.    NURSING:  The patient was placed on continuous vital signs monitoring.   Vital signs were monitored by nursing staff under my supervision.    DESCRIPTION:  The patient's low abdomen was prepped and draped in the  usual sterile fashion.  An area that appeared extra-peritoneal on  CT/US was targeted for sampling. Needle was placed into collection and  core biopsy was attempted, yielding uncertain solid fragments, four  attempts at core biopsy were made. Through the introducer cannula  aspiration was performed with return of opaque, purulent appearing,  foul-smelling viscous fluid. Needle was removed.  Pressure was held  the site,  "cleansed, and sterile dressing applied.  Images were saved  throughout the procedure.    COMPLICATIONS:  No immediate complication; the patient remained stable  throughout the procedure.    ESTIMATED BLOOD LOSS:  Minimal    SPECIMENS:  Per above    -----  \"The physician assistant (PA) who performed this procedure and signed  the above report is licensed to practice in the state [facility]  pursuant to [place] Statute 147A.09.  This includes meeting the Statute and  [place] Board of Medical Practice requirement of an active  Delegation Agreement, which documents delegation of services by  primary and alternate supervising physicians.\"   -----    JANUSZ MITCHELL PA-C      US Pelvic Complete with Transvaginal [[id number]]  Resulted: 03/05/18 1730, Result status: Final result    Ordering provider: Akila Forrest MD  03/05/18 1351 Resulted by: Brionna Pisano MD Wang, Xiao, MD    Performed: 03/05/18 1623 - 03/05/18 1647 Resulting lab: RADIOLOGY RESULTS    Narrative:       EXAM: Pelvic Ultrasound 3/5/2018 4:26 PM     COMPARISON: CT 3/4/2018 and 3/3/2018, MRI from 3/4/2018, and prior  ultrasound of 3/3/2018.    HISTORY:  Abnormal mass of possible ovarian etiology. Postmenopausal.    FINDINGS: Sonographic evaluation was technically challenging and the  patient deferred transvaginal examination.    The uterus was not well visualized transabdominally. The previously  seen complex solid/cystic right pelvic mass is redemonstrated with  internal vascularity demonstrated. The mass measures approximately 12  x 8 x 11 cm. Neither ovary is definitively identified.      Impression:       IMPRESSION:   Technically limited transabdominal only sonographic examination of the  pelvis. Redemonstration of complex right pelvic mass with internal  vascularity, again, highly concerning for malignancy. Neither ovary  was identified and therefore ovarian relationship with the mass cannot  be established.    I have personally reviewed " the examination and initial interpretation  and I agree with the findings.    JESUS MITCHELL MD      CT Abdomen Pelvis w/o Contrast [[id number]]  Resulted: 03/05/18 1057, Result status: Edited Result - FINAL    Ordering provider: Jeanette Castañeda MD  03/04/18 1540 Resulted by: Leny Major MD Rivard, Marcel Helorian, MD    Performed: 03/04/18 1828 - 03/04/18 1836 Resulting lab: RADIOLOGY RESULTS    Addenda: Addendum: On further evaluation, mass is most likely but an abscess   cannot be ruled out. For example, this could be a response from a   perforated appendix.      LENY MAJOR MD  Signed:  03/05/18 1057 by Leny Major MD    Narrative:       EXAMINATION: CT ABDOMEN PELVIS W/O CONTRAST  3/4/2018 6:36 PM      CLINICAL HISTORY: Please assess RLQ; Small bowel vs. abscess vs. mass;      COMPARISON: MRI from the same day, multiple radiographs from the same  day, CT and ultrasound from the prior day    PROCEDURE COMMENTS: CT of the abdomen was performed without contrast.  Coronal and sagittal reformatted images were obtained.    FINDINGS:  Lower thorax:   Small bilateral pleural effusions with overlying atelectasis.    Abdomen and pelvis:  There is an NG tube present with the tip in the stomach. There is  radiodense contrast scattered intraluminal position throughout the  small and large bowel. In the region of interest, the right lower  quadrant, the mass that was previously indeterminate does not contain  oral contrast. This mass measures up to at least 9.8 x 7.4 x 8.8 cm.  As there is contrast distally in the large bowel and scattered  throughout the proximal small bowel, this confirms that this mass does  not communicate with the small bowel. Along the superior aspect of the  mass there is a curvilinear hyperdensity which could represent  contrast within and an effaced distal small bowel loop. There is no  other extraluminal collection to suggest perforation. The  mass  continues to protrude through a focal thinning in the right lower  quadrant abdominal wall, and may be partially within a spigelian  hernia.    Otherwise, the liver, gallbladder, spleen, and pancreas have a  relatively normal appearance. There is diffuse anasarca. There is  dilute gadolinium in the renal collecting systems and bladder from the  MRI performed earlier on the same day.    Osseous structures:   Diffuse degenerative changes. No acute fracture.      Impression:       IMPRESSION:  1. Multi cystic 9.8 cm mass in the right lower quadrant does not  appear to communicate with the bowel. This mass is now shown to be  adjacent to the terminal ileum/cecum. 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  2. A portion of the mass may be within a laxity in the anterior  abdominal wall, possibly partially within a spigelian hernia.  3. Small bilateral pleural effusions with overlying atelectasis.  4. Otherwise stable appearance of the remaining abdominal viscera,  which were better characterized on the recent contrast-enhanced exams.      A preliminary report indicating that contrast did not go anterior the  area of interest, and that the appearance is favored to represent a  malignancy was given to the surgery resident Dr. Castañeda by the  radiology resident Dr. Hollis by telephone at approximately 0715 on  the exam date.    I have personally reviewed the examination and initial interpretation  and I agree with the findings.    LENY MAJOR MD      MR Abdomen w/o & w Contrast [[id number]]  Resulted: 03/05/18 1056, Result status: Edited Result - FINAL    Ordering provider: Jeanette Castañeda MD  03/04/18 1125 Resulted by: Leny Major MD Rivard, Marcel Helorian, MD    Performed: 03/04/18 1225 - 03/04/18 1346 Resulting lab: RADIOLOGY RESULTS     Addenda: Addendum: On further evaluation, while this is most likely a mass, an   abscess is also a consideration.  For example, a perforated appendix   could potentially look like this. Recommend biopsy. If this is   infection, it may be the amenable to percutaneous drainage.      LENY MAJOR MD  Signed:  03/05/18 1056 by Leny Major MD    Narrative:       MRI ABDOMEN    CLINICAL HISTORY:  Abdominal right lower quadrant mass with  tenderness, concern for hernia versus cystic mass.    TECHNIQUE:  Images were acquired with and without intravenous contrast  through the abdomen. The following MR images were acquired: TrueFISP,  multiplanar T2 weighted, axial fat-saturated T1, diffusion-weighted.  Multiplanar T1-weighted images with fat saturation were before  contrast administration and at multiple time points following the  administration of intravenous contrast. The field-of-view for the  postcontrast sequences was tailored to include the lower abdomen to  include the suspicious region. The liver and upper abdominal viscera  were not fully included in all of the postcontrast axial series.  Contrast dose: 4.4 cc of Gadavist    FINDINGS:    Comparison study: CT and ultrasound from 3/3/2018    The upper abdominal viscera were only included on the coronal images.  The liver, spleen, gallbladder, pancreas, and kidneys demonstrate no  obvious abnormalities, but are incompletely evaluated.    Bowel: In the right lower quadrant, in the region of interest, there  is a cluster of dilated cystic structures with a nearby ventral  abdominal hernia. These areas are peripherally enhancing, appear to  have bowel signature peripherally, but are overall indeterminant. The  diffusion-weighted images demonstrate clear restricted diffusion  within this multicystic area. There is no proximal or distal small  bowel dilation. The remaining bowel demonstrates normal bowel wall  enhancement. The visualized large bowel  is within normal limits.  Colonic diverticula are noted, without evidence of diverticulitis. No  significant free fluid is identified.    Bones and soft tissues: There are degenerative changes in the lumbar  spine which one more completely included in the evaluation on the CT.  There is convex right rotoscoliotic curvature of the lumbar spine at  approximately the level of L2, with multilevel wedge compression  deformities. No suspected acute fracture identified.    Probable hernia in the right lower quadrant ventral abdominal wall is  ill-defined.      Impression:       IMPRESSION:  1. Multicystic mass in the right lower quadrant is highly concerning  for tumor. Differential includes such entities as mucinous appendiceal  cancer or right ovarian cancer. Infection is unlikely.  2. Colonic diverticula.  3. The protocol is limited and primarily performed to evaluate the  lower abdominal wall finding. The upper abdominal viscera were not  fully included in all of the sequences, and therefore incompletely  evaluated, although no gross abnormality is identified.    Final result: Right lower quadrant mass most likely tumor.    [Preliminary result:: Possible hernia containing mildly dilated small  bowel versus multicystic abscess versus multicystic necrotic mass]    Finding was identified on 3/4/2018 1:55 PM.     The case was discussed with the surgery attending Dr. Amina Waite  by the radiology resident Dr. Hollis at 3/4/2018 3:10 PM at the  reading station. The indeterminate nature of the mass was discussed,  as well as the concern for possible abscess, possible mass, and  possible incomplete bowel obstruction. The plan for a small bowel  follow-through with oral CT contrast and a timed CT of the abdomen was  discussed.    I have personally reviewed the examination and initial interpretation  and I agree with the findings.    LENY MAJOR MD      Testing Performed By     Lab - Abbreviation Name Director Address  Valid Date Range    104 - Rad [facility] RADIOLOGY RESULTS Unknown Unknown 02/16/05 1553 - Present            Encounter-Level Documents:     There are no encounter-level documents.      Order-Level Documents:     There are no order-level documents.

## 2018-03-03 NOTE — H&P
General Surgery Admission History and Physical    Anca Alanis MRN# [id number]     Date of Admission: 3/3/2018    CC: Abdominal pain    Assessment: 89 year old female with PMH significant for dementia and history of diverticulitis here with 4 day history of abdominal pain. CT scan with conglomeration of small bowel loops in the RLQ. Labs and vitals stable.    Plan:   - Admit to surgery   - NPO, IVF  - Gastrografin challenge    Discussed with staff, Dr. Mckeon    HPI:   89 year old female with PMH significant for dementia and history of diverticulitis here with 4 day history of abdominal pain. Pt with significant dementia and not a reliable historian. History obtained jointly with pt and her . States pain in RLQ about past 3-4 days. Pain mild to moderate. No nausea/vomiting, no fever/chills. Bowel habits and stool normal. Patient denies ever noticing any mass or swelling the area. Patient's  states that he did not pay attention to that area in the past. No past surgical history in the abdomen. No FH or personal history of IBD, GI cancer. Never had colonoscopy. Unknown if lost weight recently.    Past Medical History:  Past Medical History:   Diagnosis Date     Cataracts, both eyes      Glaucoma suspect        Past Surgical History:  None    Allergies:     Allergies   Allergen Reactions     Ciprofloxacin      Sulfa Drugs        Medications:    No current facility-administered medications on file prior to encounter.   Current Outpatient Prescriptions on File Prior to Encounter:  zolpidem (AMBIEN) 5 MG tablet Take 0.5 tablets (2.5 mg) by mouth nightly as needed for sleep (Use only when having difficulty sleeping in an environment away from home.  Try 1/2 tablet and use sparingly.)   memantine (NAMENDA) 5 MG tablet Take 1 tablet (5 mg) by mouth 2 times daily   donepezil (ARICEPT) 5 MG tablet Take 1 tablet (5 mg) by mouth At Bedtime   Calcium Carbonate-Vitamin D (CALCIUM + D PO) Take  by mouth.  "  Multiple Vitamin (MULTIVITAMIN OR) Take  by mouth.   alendronate (FOSAMAX) 70 MG tablet Take 1 tablet (70 mg) by mouth every 7 days   ibuprofen (ADVIL,MOTRIN) 600 MG tablet Take 1 tablet (600 mg) by mouth every 6 hours as needed for moderate pain For 5 days   acetaminophen (TYLENOL) 325 MG tablet Take 2 tablets (650 mg) by mouth 3 times daily For 5 days       Social History:  Social History     Social History     Marital status:      Spouse name: N/A     Number of children: N/A     Years of education: N/A     Occupational History     Not on file.     Social History Main Topics     Smoking status: Never Smoker     Smokeless tobacco: Never Used     Alcohol use No     Drug use: No     Sexual activity: Not on file     Other Topics Concern     Not on file     Social History Narrative       Family History:  Family History   Problem Relation Age of Onset     Hypertension Sister      DIABETES Paternal Grandmother      DIABETES Son        ROS:  Otherwise negative    Exam:  /54  Pulse 70  Temp 97.7  F (36.5  C) (Oral)  Resp 16  Ht 1.626 m (5' 4\")  Wt 44.3 kg (97 lb 10.6 oz)  SpO2 100%  Breastfeeding? No  BMI 16.76 kg/m2  General: Alert, interactive, NAD, frail  Resp: CTAB, no crackles or wheezes  Cardiac: RRR, normal S1,S2, peripheral pulses 2+  Abdomen: Soft, non-distended. Hard mass in RLQ attached to abdominal wall. Mildly tender to palpation at that area.   Extremities: No LE edema or obvious joint abnormalities  Skin: Warm and dry, no jaundice or rash    Labs:   BMP  Recent Labs  Lab 03/03/18  0944      POTASSIUM 4.0   CHLORIDE 97   CO2 25   BUN 33*   CR 0.83   *     CBC  Recent Labs  Lab 03/03/18  0944   WBC 11.3*   HGB 12.5        LFT  Recent Labs  Lab 03/03/18  0944   INR 1.27*       Recent Labs  Lab 03/03/18  0944   *       Imaging:   Recent Results (from the past 24 hour(s))   CT Abdomen Pelvis w Contrast    Narrative    EXAMINATION: CT ABDOMEN PELVIS W CONTRAST  " 3/3/2018 10:58 AM      CLINICAL HISTORY: rlq mass, pain, possible hernia;     COMPARISON: CT 6/27/2005    PROCEDURE COMMENTS: CT of the abdomen was performed with 59cc of  Isovue 370 intravenous contrast. Coronal and sagittal reformatted  images were obtained.    FINDINGS:    Abdomen and pelvis:  Conglomeration of loops of bowel in the right lower quadrant which  appears to protrude anteriorly along the abdominal wall musculature  though does not appear to herniate through a muscular/fascial defect.  These loops of bowel do not appear abnormally distended, or  demonstrate abnormal enhancement or wall thickening. No pneumatosis.  No inflammatory changes or hyperemia in this region. There is no  proximal upstream dilatation of small bowel. Findings likely suggest  weakening of the anterior abdominal wall musculature with some  prominent outpouching. The right inguinal canal is without evidence of  herniated bowel. Metallic artifact overlies the lower abdomen and  somewhat obscures evaluation, particularly around image 260 of series  3. There is extensive colonic diverticulosis without diverticulitis.  Appendix is not visualized.    No focal abnormality in the liver, spleen, gallbladder, adrenal  glands, or pancreas. Mildly prominent pancreatic duct. Mild  intrahepatic bile duct dilation. Kidneys are symmetric without  hydronephrosis or nephrolithiasis. The bladder is relatively  decompressed. Gynecologic structures are unremarkable. There is trace  fluid in the pelvis. Tortuous abdominal aorta with scattered  calcifications though no aneurysmal dilatation. Portal vein is patent.  No lymphadenopathy.    Lower thorax: Atelectasis in the lung bases, right middle lobe and  lingula. Interlobular septal thickening with subpleural reticulation.  Coronary artery calcifications. Mitral annulus calcifications. Aortic  valve calcifications. No pericardial effusion. Mild cardiomegaly.    Osseous structures:   There is no acute  fracture or suspicious bony lesion. Severe  multilevel degenerative changes of the lumbar spine. Convex right  lumbar scoliotic curvature. Diffuse osteopenia. Degenerative changes  of the hips.      Impression    IMPRESSION:  1. Conglomeration of nondilated small bowel loops in the right lower  quadrant which appears to outpouch along focal segment of the low  midline abdominal wall musculature which appears lax but the bowel  does not herniate through the abdominal wall. There is no proximal  dilatation of small bowel to suggest obstruction. No loops of bowel  are seen within the inguinal canal.  2. Colonic diverticulosis without diverticulitis.  3. Extensive degenerative changes of the lumbar spine. Diffuse  osteopenia.  4. Mild cardiomegaly and interstitial pulmonary edema.    Dr. Lozano spoke with Dr. Iglesias of the emergency department at  approximately 11:30 regarding these results, which which she  verbalized understanding    I have personally reviewed the examination and initial interpretation  and I agree with the findings.    MD Luisito HAHN MD ([place])   Surgery PGY-2

## 2018-03-03 NOTE — IP AVS SNAPSHOT
"` `     [address] [facility]: [phone]                                              INTERAGENCY TRANSFER FORM - NURSING   3/3/2018                    Hospital Admission Date: 3/3/2018  MAREN BRADLEY   : 1924  Sex: Female        Attending Provider: Radha Mckeon MD     Allergies:  Ciprofloxacin, Sulfa Drugs    Infection:  None   Service:  SURGERY    Ht:  1.626 m (5' 4\")   Wt:  45.4 kg (100 lb)   Admission Wt:  44.3 kg (97 lb 10.6 oz)    BMI:  17.16 kg/m 2   BSA:  1.43 m 2            Patient PCP Information     Provider PCP Type    Rafat Richards MD General      Current Code Status     Date Active Code Status Order ID Comments User Context       Prior      Code Status History     Date Active Date Inactive Code Status Order ID Comments User Context    3/8/2018 12:16 PM  Full Code [id number]  Angella Patrick MD Outpatient    3/3/2018  4:57 PM 3/8/2018 12:16 PM Full Code [id number]  Luisito Sparks MD Inpatient      Advance Directives        Scanned docmt in ACP Activity?           No scanned doc        Hospital Problems as of 3/8/2018              Priority Class Noted POA    Abdominal pain Medium  3/3/2018 Yes      Non-Hospital Problems as of 3/8/2018              Priority Class Noted    Anxiety state Medium  Unknown    Backache Medium  Unknown    Dysthymic disorder Medium  Unknown    Hearing loss Medium  Unknown    Spinal stenosis, lumbar region, without neurogenic claudication Medium  Unknown    Osteoarthritis of multiple joints Medium  Unknown    Osteoporosis Medium  Unknown    Scoliosis (and kyphoscoliosis), idiopathic Medium  Unknown    Health Care Home Medium  Unknown    Sleep disorder Medium  2014    Moderate stage glaucoma - Left Eye Medium  2015    Senile cataracts of both eyes Medium  2015    Late onset Alzheimer's disease without behavioral disturbance Medium  2/10/2016      Immunizations     Name Date      Influenza (High Dose) 3 valent vaccine 10/07/15     " "Influenza (IIV3) PF 10/09/13     Influenza (IIV3) PF 10/05/11     Influenza (IIV3) PF 10/06/10     Influenza (IIV3) PF 10/14/09     Influenza (IIV3) PF 10/01/08     Influenza (IIV3) PF 11/01/04     Pneumo Conj 13-V (2010&after) 01/14/15     Pneumococcal 23 valent 02/24/04     TD (ADULT, 7+) 03/04/09          END      ASSESSMENT     Discharge Profile Flowsheet     DISCHARGE NEEDS ASSESSMENT     SKIN      Equipment Currently Used at Home  grab bar;raised toilet (owns cane but does not use) 03/05/18 1541   Inspection of bony prominences  Full 03/07/18 1710    Transportation Available  family or friend will provide 03/05/18 1541   Full except areas not inspected   Buttock, left;Buttock, right;Sacrum;Coccyx 03/07/18 0347    GASTROINTESTINAL (ADULT,PEDIATRIC,OB)     Inspection under devices  Full 03/07/18 0756    GI WDL  ex 03/08/18 0926   Skin WDL  ex 03/08/18 0926    All Quadrants Palpation  tender 03/03/18 1034   Skin Color/Characteristics  pale 03/08/18 0926    RLQ Palpation  tender 03/08/18 0926   Skin Integrity  incision(s) 03/08/18 0926    Last Bowel Movement  03/08/18 03/08/18 0926   SAFETY      GI Signs/Symptoms  abdominal discomfort 03/06/18 1625   Safety WDL  WDL 03/08/18 0926    Passing flatus  yes 03/07/18 0756   Safety Factors  bed in low position;call light in reach;upper side rails raised x 2 03/08/18 0926    COMMUNICATION ASSESSMENT     All Alarms  alarm(s) activated and audible 03/08/18 0926    Patient's communication style  spoken language (English or Bilingual) 03/03/18 0905                      Assessment WDL (Within Defined Limits) Definitions           Safety WDL     Effective: 09/28/15    Row Information: <b>WDL Definition:</b> Bed in low position, wheels locked; call light in reach; upper side rails up x 2; ID band on<br> <font color=\"gray\"><i>Item=AS safety wdl>>List=AS safety wdl>>Version=F14</i></font>      Skin WDL     Effective: 09/28/15    Row Information: <b>WDL Definition:</b> Warm; dry; " "intact; elastic; without discoloration; pressure points without redness<br> <font color=\"gray\"><i>Item=AS skin wdl>>List=AS skin wdl>>Version=F14</i></font>      Vitals     Vital Signs Flowsheet     VITAL SIGNS     Side Effects Monitoring: Respiratory Quality  R 03/06/18 1035    Temp  97.1  F (36.2  C) 03/08/18 0724   Side Effects Monitoring: Respiratory Depth  N 03/06/18 1035    Temp src  Oral 03/08/18 0724   Side Effects Monitoring: Sedation Level  1 03/06/18 1035    Resp  16 03/08/18 0724   HEIGHT AND WEIGHT      Pulse  80 03/07/18 1210   Height  1.626 m (5' 4\") 03/03/18 0912    Heart Rate  72 03/08/18 0724   Height Method  Stated 03/03/18 0912    Pulse/Heart Rate Source  Monitor 03/08/18 0724   Weight  45.4 kg (100 lb) 03/07/18 1833    BP  141/58 03/08/18 0724   Weight Method  Standing scale 03/04/18 1845    BP Location  Left arm 03/08/18 0724   Estimated Weight (From ED)  44.3 kg (97 lb 10.6 oz) 03/03/18 0912    OXYGEN THERAPY     BSA (Calculated - sq m)  1.41 03/03/18 0912    SpO2  93 % 03/08/18 0724   BMI (Calculated)  16.8 03/03/18 0912    O2 Device  None (Room air) 03/08/18 0724   POSITIONING      PAIN/COMFORT     Body Position  independently positioning 03/08/18 0926    Patient Currently in Pain  denies 03/08/18 0233   Head of Bed (HOB)  HOB at 20-30 degrees 03/08/18 0926    Preferred Pain Scale  CAPA (Clinically Aligned Pain Assessment) ([facility], [facility] and [facility] Adults Only) 03/07/18 0757   Positioning/Transfer Devices  pillows;in use 03/07/18 0756    Pain Location  Abdomen 03/05/18 0117   DAILY CARE      Pain Orientation  Right 03/05/18 0117   Activity Management  activity adjusted per tolerance;activity encouraged 03/08/18 0926    Pain Intervention(s)  Medication (See eMAR) 03/05/18 0117   Activity Assistance Provided  assistance, 1 person 03/08/18 0926    CLINICALLY ALIGNED PAIN ASSESSMENT (CAPA) ([facility], [facility] AND [facility] ADULTS ONLY)     ALAN COMA SCALE      Comfort  comfortably manageable " 03/06/18 1035   Best Eye Response  4-->(E4) spontaneous 03/08/18 0238    Change in Pain  about the same 03/05/18 0344   Best Motor Response  6-->(M6) obeys commands 03/08/18 0238    Pain Control  fully effective 03/05/18 0344   Best Verbal Response  5-->(V5) oriented 03/08/18 0238    Functioning  can do most things, but pain gets in the way of some 03/05/18 0344   Adger Coma Scale Score  15 03/08/18 0238    Sleep  normal sleep 03/05/18 0344   Assessment Qualifiers  patient not sedated/intubated 03/05/18 1107    ANALGESIA SIDE EFFECTS MONITORING                   Patient Lines/Drains/Airways Status    Active LINES/DRAINS/AIRWAYS     Name: Placement date: Placement time: Site: Days: Last dressing change:    Peripheral IV 03/03/18 Right Lower forearm 03/03/18   2311   Lower forearm   4             Patient Lines/Drains/Airways Status    Active PICC/CVC     None            Intake/Output Detail Report     Date Intake       Output   Net    Shift P.O. I.V. NG/GT IV Piggyback Total Urine Emesis/NG output Total       Day 03/07/18 0000 - 03/07/18 0659 0 -- -- -- -- -- -- -- 0    Opal 03/07/18 0700 - 03/07/18 1459 150 -- -- -- 150 150 -- 150 0    Noc 03/07/18 1500 - 03/07/18 2359 160 -- -- -- 160 -- -- -- 160    Day 03/08/18 0000 - 03/08/18 0659 0 -- -- -- -- -- -- -- 0    Opal 03/08/18 0700 - 03/08/18 1459 360 -- -- -- 360 -- -- -- 360      Last Void/BM       Most Recent Value    Urine Occurrence 1 at 03/08/2018 1108    Stool Occurrence 1 at 03/08/2018 1108      Case Management/Discharge Planning     Case Management/Discharge Planning Flowsheet     LIVING ENVIRONMENT     Filed Complexity Screen Score  4 03/05/18 0910    Lives With  spouse 03/05/18 1541   ABUSE RISK SCREEN      Living Arrangements  condominium 03/05/18 1541   QUESTION TO PATIENT:  Has a member of your family or a partner(now or in the past) intimidated, hurt, manipulated, or controlled you in any way?  no 03/03/18 0909    COPING/STRESS     QUESTION TO PATIENT:  Do you feel safe going back to the place where you are living?  yes 03/03/18 0909    Major Change/Loss/Stressor  none 03/03/18 1714   OBSERVATION: Is there reason to believe there has been maltreatment of a vulnerable adult (ie. Physical/Sexual/Emotional abuse, self neglect, lack of adequate food, shelter, medical care, or financial exploitation)?  no 03/03/18 0909    DISCHARGE PLANNING     OTHER      Transportation Available  family or friend will provide 03/05/18 1541   Are you depressed or being treated for depression?  No 03/03/18 1713    FINAL RESOURCES     HOMICIDE RISK      Equipment Currently Used at Home  grab bar;raised toilet (owns cane but does not use) 03/05/18 1541   Feels Like Hurting Others  no 03/03/18 0909    / CAREGIVER

## 2018-03-03 NOTE — PHARMACY-ADMISSION MEDICATION HISTORY
"Admission medication history interview status for the 3/3/2018 admission is complete. See Epic admission navigator for allergy information, pharmacy, prior to admission medications and immunization status.     Medication history interview sources:  Patient, Pt's , [facility], [place], Care Everywhere-[facility]    Changes made to PTA medication list (reason)  Added: N/A (per )  Deleted:   -acetaminophen (Per , pt completed 5-day regimen.  denies that pt takes any other pain medications at home.)  -ibuprofen (Per , pt is not taking at home; requested removal from PTA med list.)  -zolpidem (Per , pt is not taking at home; requested removal from PTA med list.)  Changed:   -alendronate: added \"on Thursdays.\" (per , pt) [Note: unable to verify strength (per [facility], [facility], [facility])]  -calcium-vitamin D: added \"315 mg-200 IU tablet. Take 0.5 tablet by mouth daily with meals.\" (Per Care Everywhere, last recorded 6/5/12. OTC. Pt,  unable to confirm strengths of calcium, vitamin D.  unsure about dosing regimen. Pt reported taking 0.5 tablet every month; uncommon dosing frequency.)  -multivitamin: added \"Take 0.5 tablets by mouth daily.\" (Per Care Everywhere, last recorded 3/10/08. OTC. Pt,  unable to remember brand of MVI.  unsure about dosing regimen. Pt confirmed taking 0.5 tablet daily.)    Additional medication history information (including reliability of information, actions taken by pharmacist):  -Pt's  had medication vials for donepezil, memantine on his person. , pt not able to confirm most medication dosing regimens, last doses taken.  -Pt,  denied other prescription, over-the-counter medications.   -[facility] in [place] reported by  as primary pharmacy; updated in pt's chart. [facility] did not have a record of filling any medications for this patient recently, just an influenza immunization.   -[facility] " (another potential home pharmacy pre-populated in the discharge pharmacy pop-up window) did not have any record of medications filled recently (in the past 1.5 or more years).  -No medications on file per [facility].  -Allergies confirmed, per .  -Per [facility], pt has documented influenza immunization this season.    Prior to Admission medications    Medication Sig Last Dose Taking? Auth Provider   memantine (NAMENDA) 5 MG tablet Take 1 tablet (5 mg) by mouth 2 times daily Past Week at Unknown time Yes Rafat Richards MD   donepezil (ARICEPT) 5 MG tablet Take 1 tablet (5 mg) by mouth At Bedtime Past Week at Unknown time Yes Rafat Richards MD   Calcium Carbonate-Vitamin D (CALCIUM + D PO) 315 mg-200 IU tablet. Take 0.5 tablet by mouth daily with meals. 3/2/2018 at Unknown time Yes Reported, Patient   Multiple Vitamin (MULTIVITAMIN OR) Take 0.5 tablets by mouth daily  3/2/2018 at Unknown time Yes Reported, Patient   alendronate (FOSAMAX) 70 MG tablet Take 1 tablet (70 mg) by mouth every 7 days  Patient taking differently: Take 70 mg by mouth every 7 days On Thursdays. 3/1/2018 at Unknown time  Rafat Richards MD         Medication history completed by: Melva Mackenzie

## 2018-03-03 NOTE — LETTER
Transition Communication Hand-off for Care Transitions to Next Level of Care Provider    Name: Anca Alanis  : 1924  MRN #: [id number]  Primary Care Provider: Rafat Richards     Primary Clinic: [address] [address]     Reason for Hospitalization:  Abdominal mass, unspecified abdominal location [R19.00]  Admit Date/Time: 3/3/2018  9:14 AM  Discharge Date: 3/8/18  Payor Source: Payor: MEDICARE / Plan: MEDICARE / Product Type: Medicare /     Readmission Assessment Measure (TERESA) Risk Score/category: Elevated         Reason for Communication Hand-off Referral: Other D/c to TCU    Discharge Plan:  Social Work Services Discharge Note      Patient Name:  Anca Aalnis     Anticipated Discharge Date:  3/8/18    Discharge Disposition:   TCU:  [address]   P. [phone], F. [phone]     Pre-Admission Screening (PAS) online form has been completed.  The Level of Care (LOC) is:  Determined  Confirmation Code is:  [id number]  Patient/caregiver informed of referral to [facility] Line for Pre-Admission Screening for skilled nursing facility (SNF) placement and to expect a phone call post discharge from SNF.     Additional Services/Equipment Arranged:  W/c transport arranged via [facility] ([phone]) for today at 5:45pm.      Patient / Family response to discharge plan:  Pt and  [facility] are agreeable     Persons notified of above discharge plan:  Pt, pt's  [place], bedside nurse, charge nurse, [facility] receiving facility, General Surgery team     Concern for non-adherence with plan of care:   Y/N Unknown  Discharge Needs Assessment:  Needs       Most Recent Value    Equipment Currently Used at Home grab bar, raised toilet [owns cane but does not use]    Transportation Available family or friend will provide          Already enrolled in Tele-monitoring program and name of program:  Unknown  Follow-up specialty is recommended:  Yes    Follow-up plan:  Future Appointments  Date Time Provider Department Center   3/9/2018 6:00 AM Steve Lynn OT [facility]       Any outstanding tests or procedures:        Referrals     Future Labs/Procedures    Occupational Therapy Adult Consult     Comments:    Evaluate and treat as clinically indicated.    Reason:  Deconditioning    Physical Therapy Adult Consult     Comments:    Evaluate and treat as clinically indicated.    Reason:  Deconditioning            FELIX Bell, MSW  7[address]   [phone] (pager) [phone]  3/8/2018

## 2018-03-03 NOTE — PROGRESS NOTES
Social Work: Assessment with Discharge Plan    Patient Name:  Anca Bradley  :  1924  Age:  89 year old  MRN:  [id number]  Risk/Complexity Score:     Completed assessment with:  Chart review, patient, patient's spouse    Presenting Information   Reason for Referral:  Discharge plan  Date of Intake:  March 3, 2018  Referral Source:  Physician  Decision Maker:  patient  Alternate Decision Maker:  Spouse  Health Care Directive:  Copy in Chart  Living Situation:  condo  Previous Functional Status:  Independent  Patient and family understanding of hospitalization:  abdominal pain  Cultural/Language/Spiritual Considerations:  Sabianist per demographics  Adjustment to Illness:  Appears fatigued and requesting to get something to eat    Physical Health  Reason for Admission:  No diagnosis found.  Services Needed/Recommended:  Other:  to be determined pending ED course    Mental Health/Chemical Dependency  Diagnosis:  None reported.  Support/Services in Place:  none  Services Needed/Recommended:  none    Support System  Significant relationship at present time:  Spouse W.B.  Family of origin is available for support:  yes  Other support available:  Spouse notes there is RN support avail for questions/resources avail by phone  Gaps in support system:  none  Patient is caregiver to:  None     Provider Information   Primary Care Physician:  Rafat Richards   [phone]   Clinic:  [facility]  [address] [address]      :  -    Financial   Income Source:  Not discussed  Financial Concerns:  None reported.  Insurance:    Payor/Plan Subscriber Name Rel Member # Group #   MEDICARE - MEDICARE ANCA BRADLEY*  [id number]       ATTN CLAIMS, [address]   BCBS - [place]BS OF [place] ANCA BRADLEY*  [id number]      [address] [address]       Discharge Plan   Patient and family discharge goal:  Home with spouse  Provided education on discharge plan:  YES  Patient agreeable to discharge plan:  YES  A  list of Medicare Certified Facilities was provided to the patient and/or family to encourage patient choice. Patient's choices for facility are:  Not applicable at time of assessment although may depend on ED/hospitalization course.  Will NH provide Skilled rehabilitation or complex medical:  Not applicable at time of assessment.   General information regarding anticipated insurance coverage and possible out of pocket cost was discussed. Patient and patient's family are aware patient may incur the cost of transportation to the facility, pending insurance payment: YES  Barriers to discharge:  TBD - Pending patient's progress and further recommendation.    Discharge Recommendations   Anticipated Disposition:  depends on ED/hospitalization course, current goal is home w/spouse  Transportation Needs:  Family:  spouse  Name of Transportation Company and Phone:  -    Additional comments   SW consulted via chart review given patient's age (89). SW met with patient and her spouse. Patient, Anca, is alert and responsive to questions. She appears mildly confused so spouse provides collateral information.     Anca is a 89 year old   female who comes to [facility] d/t abdominal pain. She typically resides with her spouse in their [facility]. They are independent, no current services involved and spouse continues to drive. They have two adult sons who reside in [place], [place] - one son works a  and spouse inferred that other son disabled from a ski accident and needs care. Anca ambulates without any assistive devices and they do not have any hx of home services.     At this time, medical workup is pending so final disposition not known. SW provided brief education on avail supportive home services incl home care. Discussed home bound status needed for medicare coverage of home care. Spouse is aware of who to speak to and where to go for resources. He noted there is a 'block RN' that is avail via  phone.     Plan: TBD - Pending patient's progress and further recommendation. At this time, medical workup is pending so final disposition not known. If admitted, floor SW and RN CC to follow for any d/c needs.    For weekend social work needs, contact information below and avail on [employer]:  4A, 4C, 4E, 5A, 5B pager [phone]  6A, 6B, 6C, 6D  pager [phone]  7A, 7B, 7C, 7D, 5C pager [phone]    For weekend RN care coordinator needs (home d/c with needs incl home care, assisted living facility returns, durable medical equip, IV antibiotics) - page via job code [id number]      SAMIR Mcdermott, [facility]  Social Work Services, Emergency Dept [facility]  Pager: [phone] Mon-Sat 9 am - 9 pm, on-call/after hours pager [phone]

## 2018-03-03 NOTE — ED PROVIDER NOTES
History     Chief Complaint   Patient presents with     Abdominal Pain     HPI  Anca Alanis is a 89 year old female who presents with abdominal pain. Over the past 3-4 days, the patient has been experiencing lower abdominal pain. She denies nausea, vomiting or diarrhea. Her  reports she has not been eating very much over the past couple days. She denies any changes in bowel movements or urination. She complains of generalized weakness and fatigue. She has not had any falls. She is unsure if she has had any pain like this in the past. She denies any history of chronic medical problems. She denies any history of abdominal surgeries.      Past Medical History:   Diagnosis Date     Cataracts, both eyes      Glaucoma suspect        History reviewed. No pertinent surgical history.    Family History   Problem Relation Age of Onset     Hypertension Sister      DIABETES Paternal Grandmother      DIABETES Son        Social History   Substance Use Topics     Smoking status: Never Smoker     Smokeless tobacco: Never Used     Alcohol use No     Current Facility-Administered Medications   Medication     sodium chloride 0.9% infusion     morphine (PF) injection 2-4 mg     ondansetron (ZOFRAN) injection 4 mg     Current Outpatient Prescriptions   Medication     zolpidem (AMBIEN) 5 MG tablet     memantine (NAMENDA) 5 MG tablet     donepezil (ARICEPT) 5 MG tablet     Calcium Carbonate-Vitamin D (CALCIUM + D PO)     Multiple Vitamin (MULTIVITAMIN OR)     alendronate (FOSAMAX) 70 MG tablet     ibuprofen (ADVIL,MOTRIN) 600 MG tablet     acetaminophen (TYLENOL) 325 MG tablet        Allergies   Allergen Reactions     Ciprofloxacin      Sulfa Drugs       I have reviewed the Medications, Allergies, Past Medical and Surgical History, and Social History in the Epic system.    Review of Systems   Constitutional: Positive for fatigue.   Gastrointestinal: Positive for abdominal pain (lower). Negative for constipation, diarrhea,  "nausea and vomiting.   Genitourinary: Negative for difficulty urinating.   Neurological: Positive for weakness.   All other systems reviewed and are negative.      Physical Exam   BP: 123/49  Pulse: 70  Temp: 97.7  F (36.5  C)  Resp: 16  Height: 162.6 cm (5' 4\")  Weight: 44.3 kg (97 lb 10.6 oz)  SpO2: 94 %      Physical Exam   Constitutional: She appears cachectic. No distress.   HENT:   Head: Atraumatic.   Mouth/Throat: Oropharynx is clear and moist. No oropharyngeal exudate.   Eyes: Pupils are equal, round, and reactive to light. No scleral icterus.   Cardiovascular: Normal heart sounds and intact distal pulses.    Pulmonary/Chest: Breath sounds normal. No respiratory distress.   Abdominal: Soft. Bowel sounds are normal. There is tenderness in the right lower quadrant. There is no CVA tenderness.       Musculoskeletal: She exhibits no edema or tenderness.   Neurological: She is alert.   Skin: Skin is warm. No rash noted. She is not diaphoretic.       ED Course     ED Course     Procedures     9:25 AM  The patient was seen and examined by Dr. Iglesias in Room 7.               Critical Care time:  none             Labs Ordered and Resulted from Time of ED Arrival Up to the Time of Departure from the ED   CBC WITH PLATELETS DIFFERENTIAL - Abnormal; Notable for the following:        Result Value    WBC 11.3 (*)     Absolute Neutrophil 9.9 (*)     Absolute Lymphocytes 0.4 (*)     All other components within normal limits   BASIC METABOLIC PANEL - Abnormal; Notable for the following:     Glucose 116 (*)     Urea Nitrogen 33 (*)     All other components within normal limits   INR - Abnormal; Notable for the following:     INR 1.27 (*)     All other components within normal limits   ROUTINE UA WITH MICROSCOPIC - Abnormal; Notable for the following:     Ketones Urine 40 (*)     Protein Albumin Urine 10 (*)     Leukocyte Esterase Urine Small (*)     RBC Urine 4 (*)     Mucous Urine Present (*)     All other components " within normal limits   PARTIAL THROMBOPLASTIN TIME   PERIPHERAL IV CATHETER   FREE WATER   URINE CULTURE AEROBIC BACTERIAL       Consults  Surgery: Responded (03/03/18 1108)    Assessments & Plan (with Medical Decision Making)   The patient has a palpable mass in her right lower quadrant which is quite tender and appears to be the cause of her symptoms.  She does not have signs or symptoms of bowel obstruction.  This is likely an incarcerated hernia and per history through the  this may have been present for 4 days.  The patient has dementia and is unable to tell the duration of the mass.  She was sent for CT scan to better characterize the diagnosis.    The patient CT scan is not diagnostic for an incarcerated hernia in fact this appears to be more an area of bowel that is not distended but fluid-filled and lumped together and pushing out through the atrophied abdominal wall.  She has no signs of inflammation around the area and no bowel wall thickening.  I consulted general surgery who agrees that the safest plan would be to admit her and observe her overnight.  I also updated the patient's primary doctor, Dr. Richards who agreed that she should not be discharged home.  Patient and her family were updated as well and she feels better after IV morphine and is getting IV fluids.        I have reviewed the nursing notes.    I have reviewed the findings, diagnosis, plan and need for follow up with the patient.    New Prescriptions    No medications on file       Final diagnoses:   Abdominal mass, unspecified abdominal location     I, Kylie Olsen, am serving as a trained medical scribe to document services personally performed by Apolinar Iglesias MD, based on the provider's statements to me.   IApolinar MD, was physically present and have reviewed and verified the accuracy of this note documented by Kylie Olsen.    3/3/2018   [facility], EMERGENCY DEPARTMENT     Apolinar Iglesias  MD [place]  03/03/18 9996

## 2018-03-03 NOTE — IP AVS SNAPSHOT
` `     [address] [facility] BANK: [phone]            Medication Administration Report for Anca Alanis as of 03/08/18 1303   Legend:    Given Hold Not Given Due Canceled Entry Other Actions    Time Time (Time) Time  Time-Action       Inactive    Active    Linked        Medications 03/02/18 03/03/18 03/04/18 03/05/18 03/06/18 03/07/18 03/08/18    acetaminophen (TYLENOL) tablet 650 mg  Dose: 650 mg  Freq: AT BEDTIME Route: PO  Start: 03/06/18 2200   Admin Instructions: Maximum acetaminophen dose from all sources = 75 mg/kg/day not to exceed 4 grams/day.         2107 (650 mg)-Given        2049 (650 mg)-Given               [ ] 2200           amoxicillin-clavulanate (AUGMENTIN) 500-125 MG per tablet 1 tablet  Dose: 1 tablet  Freq: EVERY 12 HOURS SCHEDULED Route: PO  Indications of Use: INTRA-ABDOMINAL INFECTION  Start: 03/08/18 1200   Admin Instructions: Dose adjusted by pharmacy per renal dosing policy for est CrCl 10-29 mL/min           [ ] 1200       [ ] 2000           bisacodyl (DULCOLAX) Suppository 10 mg  Dose: 10 mg  Freq: DAILY PRN Route: RE  PRN Reason: constipation  Start: 03/03/18 1656   Admin Instructions: Hold for loose stools.  This is the third step of a three step constipation treatment.               donepezil (ARIcept) tablet 5 mg  Dose: 5 mg  Freq: AT BEDTIME Route: PO  Start: 03/03/18 2200 2102 (5 mg)-Given        2111 (5 mg)-Given        2151 (5 mg)-Given        2107 (5 mg)-Given        2049 (5 mg)-Given               [ ] 2200           HYDROmorphone (DILAUDID) injection 0.2 mg  Dose: 0.2 mg  Freq: EVERY 2 HOURS PRN Route: IV  PRN Reason: other  PRN Comment: pain control or improvement in physical function. Hold dose for analgesic side effects.  Start: 03/03/18 1656   Admin Instructions: Notify provider to assess for uncontrolled pain or analgesic side effects. Hold while on PCA or with regular IV opioid dosing       1056 (0.2 mg)-Given        0025 (0.2 mg)-Given              lidocaine  "(LMX4) kit  Freq: EVERY 1 HOUR PRN Route: Top  PRN Reason: pain  PRN Comment: with VAD insertion or accessing implanted port.  Start: 03/03/18 1656   Admin Instructions: Do NOT give if patient has a history of allergy to any local anesthetic or any \"jalen\" product.   Apply 30 minutes prior to VAD insertion or port access.  MAX Dose:  2.5 g (  of 5 g tube)               lidocaine 1 % 1 mL  Dose: 1 mL  Freq: EVERY 1 HOUR PRN Route: OTHER  PRN Comment: mild pain with VAD insertion or accessing implanted port  Start: 03/03/18 1656   Admin Instructions: Do NOT give if patient has a history of allergy to any local anesthetic or any \"jalen\" product. MAX dose 1 mL subcutaneous OR intradermal in divided doses.               melatonin tablet 3 mg  Dose: 3 mg  Freq: AT BEDTIME Route: PO  Start: 03/06/18 2200        2107 (3 mg)-Given        2049 (3 mg)-Given               [ ] 2200           memantine (NAMENDA) tablet 5 mg  Dose: 5 mg  Freq: 2 TIMES DAILY Route: PO  Start: 03/03/18 2000     2052 (5 mg)-Given        0816 (5 mg)-Given       2111 (5 mg)-Given        0851 (5 mg)-Given       2021 (5 mg)-Given        0841 (5 mg)-Given       1905 (5 mg)-Given        0745 (5 mg)-Given       1914 (5 mg)-Given        0812 (5 mg)-Given       [ ] 2000           naloxone (NARCAN) injection 0.1-0.4 mg  Dose: 0.1-0.4 mg  Freq: EVERY 2 MIN PRN Route: IV  PRN Reason: opioid reversal  Start: 03/03/18 1656   Admin Instructions: For respiratory rate LESS than or EQUAL to 8.  Partial reversal dose:  0.1 mg titrated q 2 minutes for Analgesia Side Effects Monitoring Sedation Level of 3 (frequently drowsy, arousable, drifts to sleep during conversation).Full reversal dose:  0.4 mg bolus for Analgesia Side Effects Monitoring Sedation Level of 4 (somnolent, minimal or no response to stimulation).  For ordered doses up to 2mg give IVP. Give each 0.4mg over 15 seconds in emergency situations. For non-emergent situations further dilute in 9mL of NS to " facilitate titration of response.               ondansetron (ZOFRAN-ODT) ODT tab 4 mg  Dose: 4 mg  Freq: EVERY 6 HOURS PRN Route: PO  PRN Reasons: nausea,vomiting  Start: 03/03/18 1656   Admin Instructions: This is Step 1 of nausea and vomiting management.  If nausea not resolved in 15 minutes, go to Step 2 prochlorperazine (COMPAZINE). Do not push through foil backing. Peel back foil and gently remove. Place on tongue immediately. Administration with liquid unnecessary  With dry hands, peel back foil backing and gently remove tablet; do not push oral disintegrating tablet through foil backing; administer immediately on tongue and oral disintegrating tablet dissolves in seconds; then swallow with saliva; liquid not required.              Or  ondansetron (ZOFRAN) injection 4 mg  Dose: 4 mg  Freq: EVERY 6 HOURS PRN Route: IV  PRN Reasons: nausea,vomiting  Start: 03/03/18 1656   Admin Instructions: This is Step 1 of nausea and vomiting management.  If nausea not resolved in 15 minutes, go to Step 2 prochlorperazine (COMPAZINE).  Irritant. For ordered doses up to 4 mg, give IV Push undiluted over 2-5 minutes.               polyethylene glycol (MIRALAX/GLYCOLAX) Packet 17 g  Dose: 17 g  Freq: DAILY PRN Route: PO  PRN Reason: constipation  Start: 03/03/18 1656   Admin Instructions: Give in 8oz of  water, juice, or soda. Hold for loose stools.  This is the second step of a three step constipation treatment.  1 Packet = 17 grams. Mixed prescribed dose in 8 ounces of water. Follow with 8 oz. of water.               prochlorperazine (COMPAZINE) injection 5 mg  Dose: 5 mg  Freq: EVERY 6 HOURS PRN Route: IV  PRN Reasons: nausea,vomiting  Start: 03/03/18 1656   Admin Instructions: This is Step 2 of nausea and vomiting management. Give if nausea not resolved 15 minutes after giving ondansetron (ZOFRAN). If nausea not resolved in 15 minutes, go to Step 3 metoclopramide (REGLAN), if ordered.  For ordered doses up to 10 mg, give IV  Push undiluted. Each 5mg over 1 minute.              Or  prochlorperazine (COMPAZINE) tablet 5 mg  Dose: 5 mg  Freq: EVERY 6 HOURS PRN Route: PO  PRN Reason: vomiting  Start: 03/03/18 1656   Admin Instructions: This is Step 2 of nausea and vomiting management. Give if nausea not resolved 15 minutes after giving ondansetron (ZOFRAN). If nausea not resolved in 15 minutes, go to Step 3 metoclopramide (REGLAN), if ordered.              Or  prochlorperazine (COMPAZINE) Suppository 12.5 mg  Dose: 12.5 mg  Freq: EVERY 12 HOURS PRN Route: RE  PRN Reasons: nausea,vomiting  Start: 03/03/18 1656   Admin Instructions: This is Step 2 of nausea and vomiting management. Give if nausea not resolved 15 minutes after giving ondansetron (ZOFRAN). If nausea not resolved in 15 minutes, go to Step 3 metoclopramide (REGLAN), if ordered.               senna-docusate (SENOKOT-S;PERICOLACE) 8.6-50 MG per tablet 1 tablet  Dose: 1 tablet  Freq: 2 TIMES DAILY PRN Route: PO  PRN Reason: constipation  Start: 03/03/18 1656   Admin Instructions: If no bowel movement in 24 hours, increase to 2 tablets PO.  Hold for loose stools.              Or  senna-docusate (SENOKOT-S;PERICOLACE) 8.6-50 MG per tablet 2 tablet  Dose: 2 tablet  Freq: 2 TIMES DAILY PRN Route: PO  PRN Reason: constipation  Start: 03/03/18 1656   Admin Instructions: Hold for loose stools.               sodium chloride (PF) 0.9% PF flush 3 mL  Dose: 3 mL  Freq: EVERY 8 HOURS Route: IK  Start: 03/03/18 1700   Admin Instructions: And Q1H PRN, to lock peripheral IV dormant line.      1854 (3 mL)-Given        0051 (3 mL)-Given       (0817)-Not Given       (1602)-Not Given        (0109)-Not Given       (0851)-Not Given       2025 (3 mL)-Given        0107 (3 mL)-Given       0841 (3 mL)-Given       1605 (3 mL)-Given        0642 (3 mL)-Given       (0957)-Not Given       1915 (3 mL)-Given        0051 (3 mL)-Given       0819 (3 mL)-Given       [ ] 1700           sodium chloride (PF) 0.9% PF  flush 3 mL  Dose: 3 mL  Freq: EVERY 1 HOUR PRN Route: IK  PRN Reason: line flush  PRN Comment: for peripheral IV flush post IV meds  Start: 03/03/18 1656             Completed Medications  Medications 03/02/18 03/03/18 03/04/18 03/05/18 03/06/18 03/07/18 03/08/18         Dose: 1-30 mL  Freq: ONCE PRN Route: SC  PRN Comment: local anesthetic  Start: 03/07/18 1104   End: 03/07/18 1142   Admin Instructions: Dose may be  into smaller doses dependent upon the procedure when ordered by provider during the procedure.          1142 (7 mL)-Given           Discontinued Medications  Medications 03/02/18 03/03/18 03/04/18 03/05/18 03/06/18 03/07/18 03/08/18         Freq: HOLD Route: XX  Start: 03/06/18 2333   End: 03/07/18 1155   Admin Instructions: glipiZIDE (GLUCOTROL), glyBURIDE (DIABETA/MICRONASE/GLYNASE), glimepiride (AMARYL), gliclazide, metFORMIN (GLUCOPHAGE), any metFORMIN-containing medication (GLUCOVANCE/METAGLIP/XIGDUO XR), rosiglitazone (AVANDIA), pioglitazone (ACTOS),  sitagliptin (JANUVIA), saxagliptin (ONGLYZA) and linagliptin (TRAJENTA)    Order specific questions:  Medication(s) to hold: AM insulin or oral hypoglycemics  Parameter for hold (doses,days,conditions) : Hold  before Procedure or Test  Hours or days to hold med before/after procedure/surgery IF NPO unless otherwise instructed by provider           1155-Med Discontinued          Freq: HOLD Route: XX  Start: 03/06/18 2333   End: 03/07/18 1155   Order specific questions:  Medication(s) to hold: apixaban (ELIQUIS)  Parameter for hold (doses,days,conditions) : Hold  before Procedure or Test  Hours or days to hold med before/after procedure/surgery 24 hours pre-procedure           1155-Med Discontinued          Freq: HOLD Route: XX  Start: 03/06/18 2333   End: 03/07/18 1155   Order specific questions:  Medication(s) to hold: aspirin  Parameter for hold (doses,days,conditions) : Hold  before Procedure or Test  Hours or days to hold med before/after  procedure/surgery 5 days pre-procedure           1155-Med Discontinued          Freq: HOLD Route: XX  Start: 03/06/18 2333   End: 03/07/18 1155   Admin Instructions: CrCl greater than or equal to  50 mL/min = Hold 3 hours pre-procedure  CrCl less than 50 mL/min = Hold 5 hours pre-procedure    Order specific questions:  Medication(s) to hold: bivalirudin (ANGIOMAX)  Parameter for hold (doses,days,conditions) : Hold  before Procedure or Test  Hours or days to hold med before/after procedure/surgery See Admin Instructions           1155-Med Discontinued          Freq: HOLD Route: XX  Start: 03/06/18 2333   End: 03/07/18 1155   Order specific questions:  Medication(s) to hold: edoxaban (SAVAYSA)  Parameter for hold (doses,days,conditions) : Hold  before Procedure or Test  Hours or days to hold med before/after procedure/surgery 24 hours pre-procedure           1155-Med Discontinued          Freq: HOLD Route: XX  Start: 03/06/18 2333   End: 03/07/18 1155   Admin Instructions: Q12H and Q24H Dosing:  Hold enoxaparin dose the AM of procedure    In the event a Q12H enoxaparin patient is given a dose the evening before the procedure, you may proceed with intervention provided the patient is currently an inpatient AND a radiologist has given approval    Order specific questions:  Medication(s) to hold: enoxaparin (LOVENOX) SQ  Parameter for hold (doses,days,conditions) : Hold  before Procedure or Test  Hours or days to hold med before/after procedure/surgery See Admin Instructions           1155-Med Discontinued          Freq: HOLD Route: XX  Start: 03/06/18 2333   End: 03/07/18 1155   Order specific questions:  Medication(s) to hold: fondaparinux (ARIXTRA)  Parameter for hold (doses,days,conditions) : Hold  before Procedure or Test  Hours or days to hold med before/after procedure/surgery 24 hours pre-procedure           1155-Med Discontinued          Freq: HOLD Route: XX  Start: 03/06/18 2333   End: 03/07/18 1155   Order  specific questions:  Medication(s) to hold: Heparin IV  Parameter for hold (doses,days,conditions) : Hold  before Procedure or Test  Hours or days to hold med before/after procedure/surgery 2 hours pre-procedure           1155-Med Discontinued          Freq: HOLD Route: XX  Start: 03/06/18 2333   End: 03/07/18 1155   Order specific questions:  Medication(s) to hold: Heparin SQ  Parameter for hold (doses,days,conditions) : Hold  before Procedure or Test  Hours or days to hold med before/after procedure/surgery 12 hours           1155-Med Discontinued          Freq: HOLD Route: XX  Start: 03/06/18 2333   End: 03/07/18 1155   Order specific questions:  Medication(s) to hold: Heparin SQ  Parameter for hold (doses,days,conditions) : Hold  before Procedure or Test  Hours or days to hold med before/after procedure/surgery 8 hours           1155-Med Discontinued          Freq: HOLD Route: XX  Start: 03/06/18 2333   End: 03/07/18 1155   Admin Instructions: For liver biopsy, lung biopsy, bone biopsy, pancreas biopsy, kidney biopsy and breast biopsy (Vacuum assisted)    Order specific questions:  Medication(s) to hold: dipyridamole (PERSANTINE), aspirin/dipyridamole (AGGRENOX), cilostazol (PLETAL), clopidogrel (PLAVIX),ticlopidine (TICLID) and ticagrelor (BRILINTA)  Parameter for hold (doses,days,conditions) : Hold  before Procedure or Test  Hours or days to hold med before/after procedure/surgery 5 days pre-procedure           1155-Med Discontinued          Freq: HOLD Route: XX  Start: 03/06/18 2333   End: 03/07/18 1155   Order specific questions:  Medication(s) to hold: rivaroxaban (XARELTO)  Parameter for hold (doses,days,conditions) : Hold  before Procedure or Test  Hours or days to hold med before/after procedure/surgery 24 hours pre-procedure           1155-Med Discontinued          Freq: HOLD Route: XX  Start: 03/06/18 1601   End: 03/07/18 1155   Admin Instructions: Hours or days to hold med before/after  "procedure/surgery:    Check with the Interventional Radiology department on the hold time for the warfarin (COUMADIN).  The hold time length will be based on the INR of the patient and the discretion of the provider based on the level of the procedure risk.    Order specific questions:  Medication(s) to hold: warfarin (COUMADIN)  Parameter for hold (doses,days,conditions) : Hold  before Procedure or Test  Hours or days to hold med before/after procedure/surgery See Admin Instructions           1155-Med Discontinued          Rate: 100 mL/hr   Freq: CONTINUOUS Route: IV  Start: 03/03/18 1700   End: 03/05/18 1500     1854 ( )-New Bag         1500-Med Discontinued            Freq: EVERY 1 HOUR PRN Route: Top  PRN Reason: pain  PRN Comment: with VAD insertion or accessing implanted port.  Start: 03/06/18 2333   End: 03/07/18 1155   Admin Instructions: Do NOT give if patient has a history of allergy to any local anesthetic or any \"jalen\" product.   Apply 30 minutes prior to VAD insertion or port access. MAX Dose: 2.5 g (  of 5 g tube)          1155-Med Discontinued          Dose: 1 mL  Freq: EVERY 1 HOUR PRN Route: OTHER  PRN Comment: mild pain with VAD insertion or accessing implanted port  Start: 03/06/18 2333   End: 03/07/18 1155   Admin Instructions: Do NOT give if patient has a history of allergy to any local anesthetic or any \"jalen\" product. MAX dose 1 mL subcutaneous OR intradermal in divided doses.          1155-Med Discontinued          Dose: 1 mg  Freq: AT BEDTIME PRN Route: PO  PRN Reason: sleep  Start: 03/03/18 1656   End: 03/06/18 1423   Admin Instructions: Do not give unless at least 6 hours of uninterrupted sleep is expected.      2052 (1 mg)-Given        2111 (1 mg)-Given         1423-Med Discontinued           Dose: 3 mL  Freq: EVERY 8 HOURS Route: IK  Start: 03/06/18 2345   End: 03/07/18 1155   Admin Instructions: And Q1H PRN, to lock peripheral IV dormant line.          0745 (3 mL)-Given     "   (0956)-Not Given       1155-Med Discontinued          Dose: 3 mL  Freq: EVERY 1 HOUR PRN Route: IK  PRN Reason: line flush  Start: 03/06/18 2333   End: 03/07/18 1155   Admin Instructions: for peripheral IV flush post IV meds          1155-Med Discontinued          Rate: 75 mL/hr   Freq: CONTINUOUS Route: IV  Start: 03/06/18 2345   End: 03/07/18 1155   Admin Instructions: IF PATIENT IS RECEIVING RENAL DIALYSIS, RN to reduce rate of 0.9 % sodium chloride IV solution to 10 mL /hour (TKO) IV infusion to prevent fluid overload.                 1155-Med Discontinued     Medications 03/02/18 03/03/18 03/04/18 03/05/18 03/06/18 03/07/18 03/08/18

## 2018-03-04 ENCOUNTER — APPOINTMENT (OUTPATIENT)
Dept: GENERAL RADIOLOGY | Facility: CLINIC | Age: 83
DRG: 357 | End: 2018-03-04
Payer: MEDICARE

## 2018-03-04 ENCOUNTER — APPOINTMENT (OUTPATIENT)
Dept: MRI IMAGING | Facility: CLINIC | Age: 83
DRG: 357 | End: 2018-03-04
Payer: MEDICARE

## 2018-03-04 ENCOUNTER — APPOINTMENT (OUTPATIENT)
Dept: GENERAL RADIOLOGY | Facility: CLINIC | Age: 83
DRG: 357 | End: 2018-03-04
Attending: SURGERY
Payer: MEDICARE

## 2018-03-04 ENCOUNTER — APPOINTMENT (OUTPATIENT)
Dept: GENERAL RADIOLOGY | Facility: CLINIC | Age: 83
DRG: 357 | End: 2018-03-04
Attending: STUDENT IN AN ORGANIZED HEALTH CARE EDUCATION/TRAINING PROGRAM
Payer: MEDICARE

## 2018-03-04 ENCOUNTER — APPOINTMENT (OUTPATIENT)
Dept: CT IMAGING | Facility: CLINIC | Age: 83
DRG: 357 | End: 2018-03-04
Payer: MEDICARE

## 2018-03-04 LAB
ANION GAP SERPL CALCULATED.3IONS-SCNC: 12 MMOL/L (ref 3–14)
BACTERIA SPEC CULT: NORMAL
CHLORIDE SERPL-SCNC: 107 MMOL/L (ref 94–109)
CO2 SERPL-SCNC: 20 MMOL/L (ref 20–32)
ERYTHROCYTE [DISTWIDTH] IN BLOOD BY AUTOMATED COUNT: 13 % (ref 10–15)
HCT VFR BLD AUTO: 31.8 % (ref 35–47)
HGB BLD-MCNC: 10.2 G/DL (ref 11.7–15.7)
LACTATE BLD-SCNC: 0.8 MMOL/L (ref 0.7–2)
Lab: NORMAL
MCH RBC QN AUTO: 31.8 PG (ref 26.5–33)
MCHC RBC AUTO-ENTMCNC: 32.1 G/DL (ref 31.5–36.5)
MCV RBC AUTO: 99 FL (ref 78–100)
PLATELET # BLD AUTO: 275 10E9/L (ref 150–450)
POTASSIUM SERPL-SCNC: 3.9 MMOL/L (ref 3.4–5.3)
RBC # BLD AUTO: 3.21 10E12/L (ref 3.8–5.2)
SODIUM SERPL-SCNC: 138 MMOL/L (ref 133–144)
SPECIMEN SOURCE: NORMAL
WBC # BLD AUTO: 11.3 10E9/L (ref 4–11)

## 2018-03-04 PROCEDURE — 25000128 H RX IP 250 OP 636: Performed by: STUDENT IN AN ORGANIZED HEALTH CARE EDUCATION/TRAINING PROGRAM

## 2018-03-04 PROCEDURE — 25000132 ZZH RX MED GY IP 250 OP 250 PS 637: Mod: GY | Performed by: STUDENT IN AN ORGANIZED HEALTH CARE EDUCATION/TRAINING PROGRAM

## 2018-03-04 PROCEDURE — 74018 RADEX ABDOMEN 1 VIEW: CPT | Mod: 76

## 2018-03-04 PROCEDURE — 80051 ELECTROLYTE PANEL: CPT | Performed by: STUDENT IN AN ORGANIZED HEALTH CARE EDUCATION/TRAINING PROGRAM

## 2018-03-04 PROCEDURE — 74176 CT ABD & PELVIS W/O CONTRAST: CPT

## 2018-03-04 PROCEDURE — 12000008 ZZH R&B INTERMEDIATE UMMC

## 2018-03-04 PROCEDURE — A9270 NON-COVERED ITEM OR SERVICE: HCPCS | Mod: GY | Performed by: STUDENT IN AN ORGANIZED HEALTH CARE EDUCATION/TRAINING PROGRAM

## 2018-03-04 PROCEDURE — 36415 COLL VENOUS BLD VENIPUNCTURE: CPT | Performed by: EMERGENCY MEDICINE

## 2018-03-04 PROCEDURE — 25000128 H RX IP 250 OP 636: Performed by: RADIOLOGY

## 2018-03-04 PROCEDURE — A9585 GADOBUTROL INJECTION: HCPCS | Performed by: RADIOLOGY

## 2018-03-04 PROCEDURE — 40000986 XR ABDOMEN PORT 1 VW

## 2018-03-04 PROCEDURE — 85027 COMPLETE CBC AUTOMATED: CPT | Performed by: EMERGENCY MEDICINE

## 2018-03-04 PROCEDURE — 25000125 ZZHC RX 250

## 2018-03-04 PROCEDURE — 83605 ASSAY OF LACTIC ACID: CPT | Performed by: EMERGENCY MEDICINE

## 2018-03-04 PROCEDURE — 74183 MRI ABD W/O CNTR FLWD CNTR: CPT

## 2018-03-04 PROCEDURE — 74018 RADEX ABDOMEN 1 VIEW: CPT

## 2018-03-04 PROCEDURE — 36415 COLL VENOUS BLD VENIPUNCTURE: CPT | Performed by: STUDENT IN AN ORGANIZED HEALTH CARE EDUCATION/TRAINING PROGRAM

## 2018-03-04 RX ORDER — GADOBUTROL 604.72 MG/ML
0.1 INJECTION INTRAVENOUS ONCE
Status: COMPLETED | OUTPATIENT
Start: 2018-03-04 | End: 2018-03-04

## 2018-03-04 RX ADMIN — Medication 0.2 MG: at 10:56

## 2018-03-04 RX ADMIN — LIDOCAINE HYDROCHLORIDE 10 ML: 20 JELLY TOPICAL at 16:01

## 2018-03-04 RX ADMIN — MEMANTINE HYDROCHLORIDE 5 MG: 5 TABLET, FILM COATED ORAL at 21:11

## 2018-03-04 RX ADMIN — MEMANTINE HYDROCHLORIDE 5 MG: 5 TABLET, FILM COATED ORAL at 08:16

## 2018-03-04 RX ADMIN — Medication 1 MG: at 21:11

## 2018-03-04 RX ADMIN — DONEPEZIL HYDROCHLORIDE 5 MG: 5 TABLET, FILM COATED ORAL at 21:11

## 2018-03-04 RX ADMIN — GADOBUTROL 4.4 ML: 604.72 INJECTION INTRAVENOUS at 12:26

## 2018-03-04 NOTE — PLAN OF CARE
Problem: Patient Care Overview  Goal: Plan of Care/Patient Progress Review  Outcome: No Change  A&O x4. Forgetful at times. Gait unsteady. Bed alarm on. Pt c/o RLQ discomfort. Discomfort tolerable. RLE tender and firm. Watery BM x2. Pt NPO ex icechips. LR infusing at 100ml/hr. Abdomen XR completed. See chart. Continue POC.

## 2018-03-04 NOTE — PLAN OF CARE
Problem: Patient Care Overview  Goal: Plan of Care/Patient Progress Review  Outcome: No Change  Pt arrived to 7B from ED at 1700.  AVSS.  Discomfort in RLQ she describes as dullness that comes/goes, currently denies need for pain meds.  RLQ is tender and some firmness compared to LLQ, otherwise abd soft.  Active BS all quadrants, +BM x2 loose and watery.  Ambulates to BR but SBAx1, loses balance at times.  AOx4 but pleasantly forgetful and repetitive questions.  Wears her hearing aids to bed because she is afraid she will forget them otherwise.  Unknown if she sundowns, so bed alarm on.  She did not tolerate contrast for gastrografin study, so await new orders.  Continue POC.

## 2018-03-04 NOTE — PROGRESS NOTES
Surgery Progress Note  3/4/2018     Subjective:  - CELY overnight.  - Her pain is controlled on analgesics, denies N/V. Has had BM yesterday.    Objective:  Temp:  [96.3  F (35.7  C)-98.6  F (37  C)] 97.9  F (36.6  C)  Pulse:  [81-88] 81  Heart Rate:  [84] 84  Resp:  [16] 16  BP: ()/(53-79) 118/53  SpO2:  [95 %-100 %] 96 %  I/O last 3 completed shifts:  In: 786 [P.O.:110; I.V.:676]  Out: -     Gen: Awake, confused, doesn't seem to be overtly distressed  Resp: NLB on RA  Abd: Soft, non distended,non tender, right lower quadrant has a firm non mobile, no npulsatile mass which extends until the pubis, no erythema or fluctuance around it  Ext: WWP    BMP  Recent Labs  Lab 03/04/18  0840 03/03/18  0944    134   POTASSIUM 3.9 4.0   CHLORIDE 107 97   ANH  --  9.1   CO2 20 25   BUN  --  33*   CR  --  0.83   GLC  --  116*     CBC  Recent Labs  Lab 03/03/18  0944   WBC 11.3*   RBC 3.89   HGB 12.5   HCT 38.3   MCV 99   MCH 32.1   MCHC 32.6   RDW 13.0        INR  Recent Labs  Lab 03/03/18  0944   INR 1.27*      A/P: Anca Alanis is a 89 year old female with PMH significant for dementia and diverticulitis who presented with abdominal pain initially thought to be due to SBO, however, imaging has shown for it to be an abdominal mass which warrants further investigation    - scheduled tylenol and IV dilaudid PRN for pain control  - HDS  - Incentive spirometer  - Afebrile continue to observe  - NPO, possible procedure with IR for abdominal mass biopsy  - MRI with contrast   - SCD   - PT/OT  - To send discharge summary with results to PCP      Seen with Chief Resident; Dr Castañeda and staff: Dr Mike Louis MD  General Surgery, PGY-1,   Pager: [phone]

## 2018-03-04 NOTE — PLAN OF CARE
Problem: Patient Care Overview  Goal: Plan of Care/Patient Progress Review  Neuro: Disoriented to date. Bed alarm on. History of Dementia.   Cardiac:  VSS.   Respiratory: Sating 100% on RA. Instructed on IS. Is at bedside.   GI/: Voided but missed the hat. No BM noted.   Diet/appetite: NPO  Activity:  Assist of 1.   Pain: Abd pain. Gave IV dilaudid x 1 and hot packs.   Skin: Intact and Pale  LDA's: PIV infusing.     Plan: Currently at MRI.  at bedside.

## 2018-03-04 NOTE — PROGRESS NOTES
SPIRITUAL HEALTH SERVICES  SPIRITUAL ASSESSMENT Progress Note  [facility] ([place]) [facility]B   ON-CALL VISIT    REFERRAL SOURCE: [facility] [place]      Visited briefly with pt and her , who seemed to struggle to hear the conversation.  Pt seemed slightly confused (noted per chart note dementia diagnosis, and pt repeated herself).  Introduced Spiritual Health Services, pt stated this was not a good time for a visit because her  was watching a baseball game.  Pt did not seem interested in a follow-up visit.  I affirmed Spiritual Health Services support and told pt she could ask a nurse if she wishes to visit with a .    PLAN: No follow up a this time.    Bernadette Mcbride  Chaplain Resident  Pager [phone]

## 2018-03-05 ENCOUNTER — APPOINTMENT (OUTPATIENT)
Dept: OCCUPATIONAL THERAPY | Facility: CLINIC | Age: 83
DRG: 357 | End: 2018-03-05
Payer: MEDICARE

## 2018-03-05 ENCOUNTER — APPOINTMENT (OUTPATIENT)
Dept: PHYSICAL THERAPY | Facility: CLINIC | Age: 83
DRG: 357 | End: 2018-03-05
Payer: MEDICARE

## 2018-03-05 ENCOUNTER — APPOINTMENT (OUTPATIENT)
Dept: ULTRASOUND IMAGING | Facility: CLINIC | Age: 83
DRG: 357 | End: 2018-03-05
Payer: MEDICARE

## 2018-03-05 ENCOUNTER — APPOINTMENT (OUTPATIENT)
Dept: INTERVENTIONAL RADIOLOGY/VASCULAR | Facility: CLINIC | Age: 83
DRG: 357 | End: 2018-03-05
Attending: RADIOLOGY PRACTITIONER ASSISTANT
Payer: MEDICARE

## 2018-03-05 LAB
ALBUMIN SERPL-MCNC: 1.8 G/DL (ref 3.4–5)
BASOPHILS # BLD AUTO: 0 10E9/L (ref 0–0.2)
BASOPHILS NFR BLD AUTO: 0.1 %
CANCER AG125 SERPL-ACNC: 29 U/ML (ref 0–30)
CEA SERPL-MCNC: <0.5 UG/L (ref 0–2.5)
DIFFERENTIAL METHOD BLD: ABNORMAL
EOSINOPHIL # BLD AUTO: 0 10E9/L (ref 0–0.7)
EOSINOPHIL NFR BLD AUTO: 0.3 %
ERYTHROCYTE [DISTWIDTH] IN BLOOD BY AUTOMATED COUNT: 13.1 % (ref 10–15)
HCT VFR BLD AUTO: 32.1 % (ref 35–47)
HGB BLD-MCNC: 10.4 G/DL (ref 11.7–15.7)
IMM GRANULOCYTES # BLD: 0 10E9/L (ref 0–0.4)
IMM GRANULOCYTES NFR BLD: 0.3 %
INR PPP: 1.3 (ref 0.86–1.14)
LYMPHOCYTES # BLD AUTO: 0.9 10E9/L (ref 0.8–5.3)
LYMPHOCYTES NFR BLD AUTO: 8.4 %
MCH RBC QN AUTO: 31.5 PG (ref 26.5–33)
MCHC RBC AUTO-ENTMCNC: 32.4 G/DL (ref 31.5–36.5)
MCV RBC AUTO: 97 FL (ref 78–100)
MONOCYTES # BLD AUTO: 0.6 10E9/L (ref 0–1.3)
MONOCYTES NFR BLD AUTO: 5 %
NEUTROPHILS # BLD AUTO: 9.6 10E9/L (ref 1.6–8.3)
NEUTROPHILS NFR BLD AUTO: 85.9 %
NRBC # BLD AUTO: 0 10*3/UL
NRBC BLD AUTO-RTO: 0 /100
PLATELET # BLD AUTO: 290 10E9/L (ref 150–450)
RBC # BLD AUTO: 3.3 10E12/L (ref 3.8–5.2)
WBC # BLD AUTO: 11.1 10E9/L (ref 4–11)

## 2018-03-05 PROCEDURE — 82378 CARCINOEMBRYONIC ANTIGEN: CPT | Performed by: STUDENT IN AN ORGANIZED HEALTH CARE EDUCATION/TRAINING PROGRAM

## 2018-03-05 PROCEDURE — 97162 PT EVAL MOD COMPLEX 30 MIN: CPT | Mod: GP | Performed by: PHYSICAL THERAPIST

## 2018-03-05 PROCEDURE — 85610 PROTHROMBIN TIME: CPT | Performed by: RADIOLOGY PRACTITIONER ASSISTANT

## 2018-03-05 PROCEDURE — 82040 ASSAY OF SERUM ALBUMIN: CPT | Performed by: STUDENT IN AN ORGANIZED HEALTH CARE EDUCATION/TRAINING PROGRAM

## 2018-03-05 PROCEDURE — 97165 OT EVAL LOW COMPLEX 30 MIN: CPT | Mod: GO | Performed by: OCCUPATIONAL THERAPIST

## 2018-03-05 PROCEDURE — 36415 COLL VENOUS BLD VENIPUNCTURE: CPT | Performed by: STUDENT IN AN ORGANIZED HEALTH CARE EDUCATION/TRAINING PROGRAM

## 2018-03-05 PROCEDURE — 36415 COLL VENOUS BLD VENIPUNCTURE: CPT | Performed by: RADIOLOGY PRACTITIONER ASSISTANT

## 2018-03-05 PROCEDURE — 84134 ASSAY OF PREALBUMIN: CPT | Performed by: STUDENT IN AN ORGANIZED HEALTH CARE EDUCATION/TRAINING PROGRAM

## 2018-03-05 PROCEDURE — 76830 TRANSVAGINAL US NON-OB: CPT

## 2018-03-05 PROCEDURE — 86304 IMMUNOASSAY TUMOR CA 125: CPT | Performed by: STUDENT IN AN ORGANIZED HEALTH CARE EDUCATION/TRAINING PROGRAM

## 2018-03-05 PROCEDURE — 85025 COMPLETE CBC W/AUTO DIFF WBC: CPT | Performed by: RADIOLOGY PRACTITIONER ASSISTANT

## 2018-03-05 PROCEDURE — 76705 ECHO EXAM OF ABDOMEN: CPT

## 2018-03-05 PROCEDURE — 86301 IMMUNOASSAY TUMOR CA 19-9: CPT | Performed by: STUDENT IN AN ORGANIZED HEALTH CARE EDUCATION/TRAINING PROGRAM

## 2018-03-05 PROCEDURE — 97110 THERAPEUTIC EXERCISES: CPT | Mod: GP | Performed by: PHYSICAL THERAPIST

## 2018-03-05 PROCEDURE — 40000193 ZZH STATISTIC PT WARD VISIT: Performed by: PHYSICAL THERAPIST

## 2018-03-05 PROCEDURE — 12000008 ZZH R&B INTERMEDIATE UMMC

## 2018-03-05 PROCEDURE — 97535 SELF CARE MNGMENT TRAINING: CPT | Mod: GO | Performed by: OCCUPATIONAL THERAPIST

## 2018-03-05 PROCEDURE — 97530 THERAPEUTIC ACTIVITIES: CPT | Mod: GP | Performed by: PHYSICAL THERAPIST

## 2018-03-05 PROCEDURE — 40000133 ZZH STATISTIC OT WARD VISIT: Performed by: OCCUPATIONAL THERAPIST

## 2018-03-05 PROCEDURE — 25000128 H RX IP 250 OP 636: Performed by: STUDENT IN AN ORGANIZED HEALTH CARE EDUCATION/TRAINING PROGRAM

## 2018-03-05 PROCEDURE — 25000132 ZZH RX MED GY IP 250 OP 250 PS 637: Mod: GY | Performed by: STUDENT IN AN ORGANIZED HEALTH CARE EDUCATION/TRAINING PROGRAM

## 2018-03-05 PROCEDURE — A9270 NON-COVERED ITEM OR SERVICE: HCPCS | Mod: GY | Performed by: STUDENT IN AN ORGANIZED HEALTH CARE EDUCATION/TRAINING PROGRAM

## 2018-03-05 RX ADMIN — DONEPEZIL HYDROCHLORIDE 5 MG: 5 TABLET, FILM COATED ORAL at 21:51

## 2018-03-05 RX ADMIN — Medication 0.2 MG: at 00:25

## 2018-03-05 RX ADMIN — MEMANTINE HYDROCHLORIDE 5 MG: 5 TABLET, FILM COATED ORAL at 08:51

## 2018-03-05 RX ADMIN — MEMANTINE HYDROCHLORIDE 5 MG: 5 TABLET, FILM COATED ORAL at 20:21

## 2018-03-05 NOTE — PROGRESS NOTES
"CLINICAL NUTRITION SERVICES - ASSESSMENT NOTE     Nutrition Prescription    RECOMMENDATIONS FOR MDs/PROVIDERS TO ORDER:  Diet re-advance per provider discretion    Malnutrition Status:    Unable to determine due to pt busy with cares during attempts to visit    Recommendations already ordered by Registered Dietitian (RD):  None at this time     Future/Additional Recommendations:  When/if diet advances, encourage patient to consume at least 75% of meals TID or the equivalent with snacks/supplements.  If consuming less than this offer supplements, scheduled snacks, and/or consider ordering calorie counts to assess PO intake adequacy.  Consider multivitamin with minerals if suspect PO intake inadequate.      REASON FOR ASSESSMENT  Anca Alanis is a/an 89 year old female assessed by the dietitian for Admission Nutrition Risk Screen for reduced oral intake over the last month (comments: reduced oral intake past few days)    NUTRITION HISTORY  Per chart, pt admit with 3-4 days of abdominal pain and poor appetite. Found to have abdominal mass (to IR today for possible biopsy)    CURRENT NUTRITION ORDERS  Diet: NPO  Intake/Tolerance: NPO for procedure today, was on a regular diet briefly this morning before made NPO. Was NPO 3/3-3/5. Pt had NGT to LIS 3/4-3/5 (NG removed this morning).  Per provider note today pt wants to eat/drink.    LABS  Labs reviewed    MEDICATIONS  Medications reviewed    ANTHROPOMETRICS  Height: 162.6 cm (5' 4\")  Most Recent Weight: 46.2 kg (101 lb 12.8 oz)    IBW: 54.5 kg   BMI: Underweight BMI <18.5  Weight History: Wt has been between 100-103# since August 2017.  Wt Readings from Last 10 Encounters:   03/04/18 46.2 kg (101 lb 12.8 oz)   10/25/17 46.7 kg (103 lb)   08/08/17 (P) 45.7 kg (100 lb 11.2 oz)   04/26/17 47.6 kg (105 lb)   06/29/16 48.4 kg (106 lb 9.6 oz)   04/06/16 48.5 kg (107 lb)   02/10/16 49 kg (108 lb)   11/30/15 47.7 kg (105 lb 3.2 oz)   05/20/15 48.3 kg (106 lb 6.4 oz) "   01/14/15 50 kg (110 lb 3.2 oz)      Dosing Weight: 46 kg (actual)    ASSESSED NUTRITION NEEDS  Estimated Energy Needs: 6449-1483 kcals/day (30 - 35 kcals/kg )  Justification: Underweight  Estimated Protein Needs: 46-55 grams protein/day (1 - 1.2 grams of pro/kg)  Justification: Hypercatabolism with acute illness  Estimated Fluid Needs: (1 mL/kcal)   Justification: Maintenance, or per provider pending fluid status    PHYSICAL FINDINGS  See malnutrition section below.    MALNUTRITION  % Intake: estimate <50% of >/= 5 days (severe)  % Weight Loss: None noted  Subcutaneous Fat Loss: Unable to assess  Muscle Loss: Unable to assess  Fluid Accumulation/Edema: None noted per chart review  Malnutrition Diagnosis: Unable to determine due to pt busy with cares during attempts to visit    NUTRITION DIAGNOSIS  Inadequate oral intake related to abdominal pain hindering PO intakes as evidenced by reports of poor PO intake x 2 days PTA     INTERVENTIONS  Implementation  Nutrition Education: Unable to complete due to pt busy with cares during attempts to visit     Goals  1. Diet adv v nutrition support within 2-3 days.  2. Patient to consume % of nutritionally adequate meal trays TID, or the equivalent with supplements/snacks.     Monitoring/Evaluation  Progress toward goals will be monitored and evaluated per protocol.     Dary Saavedra RD, LD   [address] RD Pager: [phone]

## 2018-03-05 NOTE — PLAN OF CARE
Problem: Patient Care Overview  Goal: Plan of Care/Patient Progress Review  Discharge Planner OT   Patient plan for discharge: not stated  Current status: OT eval completed and tx initiated. Pt needed max encouragement for participation; responded best to simply being told the plan vs being given options. Pt requires CGA-min A for functional transfers, toileting, and mobility ~80ft x 2 - difficulty following cues for safe FWW use.   Barriers to return to prior living situation: weakness, impaired balance, cognitive deficits  Recommendations for discharge: TCU  Rationale for recommendations: to increase pt's safety and (I) with ADLs       Entered by: Maggie Jutsin 03/05/2018 3:54 PM

## 2018-03-05 NOTE — PLAN OF CARE
"Problem: Patient Care Overview  Goal: Plan of Care/Patient Progress Review  Outcome: No Change  [id number].  AVSS.  Not requiring pain medication this shift.  NG placed by Dr Castañeda for administration of omnipaque, pt tolerated well, CT abd pelvis completed, awaiting read by radiologist.  NG to LCS, though pt c/o \"upset stomach\", relieved when suction off, paged MD and order now NG to LIS.  AOx4, very pleasant but forgetful, needs to be reminded of NG tube and purpose, bed alarm on.  Active BS, abd soft, tender RLQ.  Ambulates to BR to void but misses the hat.  Continue POC.      "

## 2018-03-05 NOTE — PLAN OF CARE
Problem: Patient Care Overview  Goal: Plan of Care/Patient Progress Review  Outcome: No Change  B/P: 126/63, T: 98.2, P: 88, R: 16  Patient denies any pain.  Abdomen soft with bowel sounds present, patient reports passing gas and no BM today.  Voiding but missed hat.  Up with walker and SBA.  Patient went down to IR today for biopsy but nothing was done.  LR infusing through PIV as ordered.   at bedside.  Continue with POC.

## 2018-03-05 NOTE — PLAN OF CARE
Problem: Patient Care Overview  Goal: Plan of Care/Patient Progress Review  Outcome: No Change   03/05/18 0559   OTHER   Plan Of Care Reviewed With patient   Plan of Care Review   Progress no change       Comments: VSS and sating well on RA. C/o abdominal pain x1, given dilaudid with relief. LUQ mass tender to touch. NPO. NG to LIS. denies nausea. A+O x4 but [place] and forgetful at times, easily re orientated . She is unsteady on her feet at times. Bed alarm on for safety. LR @ 100 running into right PIV. They may biopsy her abdominal mass today.

## 2018-03-05 NOTE — CONSULTS
Patient is on IR schedule 3/5/2018 for a US guided right lower quadrant mass biopsy.   Labs WNL for procedure.    Orders for NPO, scrubs and antibiotics have been entered.  Consent will be done prior to procedure.    Please contact the IR charge RN at [phone] for estimated time of procedure.     Munira Conklin IR RPA  [phone] Call pager  [phone] pager

## 2018-03-05 NOTE — CONSULTS
Note erroneously associated with active open consult for OT. Notified referring to please place new OT consult as I could not find a way to undo the link with my IR note.    -----    Patient in IR for requested RLQ mass biopsy.    History: copied [Anca Alanis is a 89 year old female with PMH significant for dementia and diverticulitis who presented with abdominal pain initially thought to be due to SBO, however, imaging has shown for it to be an abdominal mass which warrants further investigation]    Imaging:   EXAMINATION: CT ABDOMEN PELVIS W/O CONTRAST  3/4/2018 6:36 PM    IMPRESSION:  1. Multi cystic 9.8 cm mass in the right lower quadrant does not  appear to communicate with the bowel. This mass is now shown to be  adjacent to the terminal ileum/cecum. 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  2. A portion of the mass may be within a laxity in the anterior  abdominal wall, possibly partially within a spigelian hernia.  3. Small bilateral pleural effusions with overlying atelectasis.  4. Otherwise stable appearance of the remaining abdominal viscera,  which were better characterized on the recent contrast-enhanced exams.        A preliminary report indicating that contrast did not go anterior the  area of interest, and that the appearance is favored to represent a  malignancy was given to the surgery resident Dr. Castañeda by the  radiology resident Dr. Hollis by telephone at approximately 0715 on  the exam date.     I have personally reviewed the examination and initial interpretation  and I agree with the findings.     LENY MAJOR MD    Addendum   Leny Major MD   Mon Mar 5, 2018 10:57:55 AM CST   Addendum: On further evaluation, mass is most likely but an abscess  cannot be ruled out. For example, this could be a response from  a  perforated appendix.     LENY MAJOR MD     Pre-procedure informed consent obtained from spouse and patient. Exam shows large distention in RLQ, moderate tenderness to palpation. US imaging shows largely cystic mass in RLQ with little solid component. I am concerned that biopsying this mass may spread a gelatinous cancer. Discussed case with IR on-call Dr. Bo Wayne. Paged referring surgical team who consulted us in IR (Jeanette Castañeda [phone]) received page-back from Angella Louis [phone]. Discussed case and concerns. Told that she and chief (Sara Lanier [phone]) will discuss with staff MD, Rafat Renee [phone]. Cancelling procedure for now. Updated patient's spouse.

## 2018-03-05 NOTE — PROGRESS NOTES
03/05/18 0901   Quick Adds   Type of Visit Initial PT Evaluation   Living Environment   Lives With spouse   Living Arrangements condominium  (IND living setting)   Home Accessibility no concerns   Number of Stairs to Enter Home 0   Number of Stairs Within Home 0   Living Environment Comment Per  notes, pt resides with her spouse in their [facility]. They are independent, no current services involved and spouse continues to drive. They have two adult sons who reside in [place], [place] - one son works a  and spouse inferred that other son disabled from a ski accident and needs care. Anca ambulates without any assistive devices and they do not have any hx of home services.    Self-Care   Usual Activity Tolerance good   Current Activity Tolerance fair   Regular Exercise yes   Exercise Amount/Frequency (daily hobbs walks,  also 2x/wk w/spouse)   Equipment Currently Used at Home (owns cane, does not use)   Activity/Exercise/Self-Care Comment Pt did not provided specific info as to ex routine, reports she is tired. At baseline pt IND with BADLs.    Functional Level Prior   Ambulation 0-->independent   Transferring 0-->independent   Toileting 0-->independent   Bathing 0-->independent   Dressing 0-->independent   Eating 0-->independent   Communication 0-->understands/communicates without difficulty   Swallowing 0-->swallows foods/liquids without difficulty   Cognition 0 - no cognition issues reported   Fall history within last six months no   Which of the above functional risks had a recent onset or change? none   Prior Functional Level Comment Fully IND at baseline with gait, transfers, bed mobility   General Information   Onset of Illness/Injury or Date of Surgery - Date 03/03/18   Referring Physician Angella Patrick MD   Patient/Family Goals Statement To feel better.    Pertinent History of Current Problem (include personal factors and/or comorbidities that impact the POC) Anca GALO  "Zeke is a 89 year old female who presents with abdominal pain. Over the past 3-4 days, the patient has been experiencing lower abdominal pain. She denies nausea, vomiting or diarrhea. Her  reports she has not been eating very much over the past couple days. She denies any changes in bowel movements or urination. She complains of generalized weakness and fatigue. She has not had any falls. She is unsure if she has had any pain like this in the past. She denies any history of chronic medical problems. She denies any history of abdominal surgeries. Pt found to have R LQ mass.   Precautions/Limitations fall precautions   General Observations pt appears very tired   General Info Comments activity: up ad parvez   Cognitive Status Examination   Level of Consciousness alert;lethargic/somnolent   Follows Commands and Answers Questions 100% of the time   Personal Safety and Judgment intact   Memory impaired  (some impairment reported)   Cognitive Comment reports some baseline STM deficits \"for my age\", notes sometimes hard to keep track of the days, but overall that she is pretty IND for her age.    Pain Assessment   Patient Currently in Pain Yes, see Vital Sign flowsheet  (abdominal pain noted with edge of bed sitting)   Posture    Posture Comments reduced upright posture   Range of Motion (ROM)   ROM Comment no deficits noted   Strength   Strength Comments reduced functionally, see below   Bed Mobility   Bed Mobility Comments ARIK to roll R, ARIK supine>sit ARIK-MODA sit>supine   Transfer Skills   Transfer Comments pt declined due to fatigue, weakness   Gait   Gait Comments pt declined   Balance   Balance Comments SBA sitting at edge of bed, without significant UE use to steady   Coordination   Coordination Comments intact   General Therapy Interventions   Planned Therapy Interventions bed mobility training;gait training;neuromuscular re-education;ROM;strengthening;stretching;transfer training;home program " "guidelines;progressive activity/exercise   Clinical Impression   Criteria for Skilled Therapeutic Intervention yes, treatment indicated   PT Diagnosis weakness   Influenced by the following impairments pain, reduced strength, balance, safety   Functional limitations due to impairments below baseline bed mobility, reduced tolerance for transfers, gait, standing activities   Clinical Presentation Evolving/Changing   Clinical Presentation Rationale pt yet to have exam related to R LQ mass, plan TBD. pt having abd pain which is new vs baseline, overall symptoms of pain and fatigue impact activity tolerance   Clinical Decision Making (Complexity) Moderate complexity   Therapy Frequency` daily   Predicted Duration of Therapy Intervention (days/wks) 1 week   Anticipated Equipment Needs at Discharge (FWW potentially)   Anticipated Discharge Disposition Home with Assist;Home with Home Therapy;Transitional Care Facility   Risk & Benefits of therapy have been explained Yes   Patient, Family & other staff in agreement with plan of care Yes   [facility] TM \"6 Clicks\"   2016, Trustees of [facility], under license to [employer].  All rights reserved.   6 Clicks Short Forms Basic Mobility Inpatient Short Form   [facility]  \"6 Clicks\" V.2 Basic Mobility Inpatient Short Form   1. Turning from your back to your side while in a flat bed without using bedrails? 3 - A Little   2. Moving from lying on your back to sitting on the side of a flat bed without using bedrails? 3 - A Little   3. Moving to and from a bed to a chair (including a wheelchair)? 3 - A Little   4. Standing up from a chair using your arms (e.g., wheelchair, or bedside chair)? 3 - A Little   5. To walk in hospital room? 3 - A Little   6. Climbing 3-5 steps with a railing? 2 - A Lot   Basic Mobility Raw Score (Score out of 24.Lower scores equate to lower levels of function) 17   Total Evaluation Time   Total Evaluation Time (Minutes) " 10

## 2018-03-05 NOTE — PROGRESS NOTES
"General surgery progress note  DOS: 03/05/18    S: No significant events overnight. Denies pain at rest. RLQ pain with palpation. Wants to eat and drink.    O: /63 (BP Location: Left arm)  Pulse 88  Temp 98.2  F (36.8  C) (Oral)  Resp 16  Ht 1.626 m (5' 4\")  Wt 46.2 kg (101 lb 12.8 oz)  SpO2 98%  Breastfeeding? No  BMI 17.47 kg/m2  Awake, alert, appears comfortable  NLB  NG clear output  Abd soft, nondistended. + RLQ tenderness with palpation    100 cc NG output    A/P: 89 year old female with RLQ mass. Does not appear to involve bowel.   -NG removed today  -Will talk to IR about biopsy  -Can have diet after procedure  -Anticipate discharge and clinic follow up while waiting for pathology    Sara Lanier MD, PhD  PGY-8, [facility] Surgery  [phone]      "

## 2018-03-05 NOTE — CONSULTS
SURGICAL ONCOLOGY CONSULT NOTE  3/5/2018    PRIMARY TEAM: General Surgery     REASON FOR CONSULT: Intra-abdominal mass   LEVEL OF CONSULT: Consult and follow for daily recommendations      ASSESSMENT: Anca Alanis is a 89 year old female with an intra-abdominal mass of unknown etiology. Concern for mucinous neoplasm precludes IR image guided biopsy. We have been consulted to assist with workup and mgmt.      RECOMMENDATIONS:   Add on tumor markers (CEA, )  Transvaginal ultrasound may help delineate origin  Will review images in detail and clarify goals of care with patient   Recommend palliative care consult   For now continued imaging per primary team     HISTORY PRESENTING ILLNESS: This is a 89 year old female with PMH significant for dementia who presented with abdominal pain. Workup significant for intra-abdominal mass of uncertain etiology. She states she had not noticed the mass until about 1 week ago. States she did lose some weight over the last year, about 25 lbs. No fevers, no chills, never had a colonoscopy. History obtained from patient and , and details about her health history are not thorough as they are poor historians.     Review of systems:   No fevers, no chills, no nausea, no vomiting, + weight loss, no MSK pain, + abdominal tenderness     PAST MEDICAL HISTORY:  Past Medical History:   Diagnosis Date     Cataracts, both eyes      Glaucoma suspect    Dementia    PAST SURGICAL HISTORY:  - None     FAMILY HISTORY:  family history includes DIABETES in her paternal grandmother and son; Hypertension in her sister.    SOCIAL HISTORY:   reports that she has never smoked. She has never used smokeless tobacco. She reports that she does not drink alcohol or use illicit drugs.    ALLERGIES:  Allergies   Allergen Reactions     Ciprofloxacin      Sulfa Drugs      MEDICATIONS:  Prescriptions Prior to Admission   Medication Sig Dispense Refill Last Dose     memantine (NAMENDA) 5 MG tablet  "Take 1 tablet (5 mg) by mouth 2 times daily 180 tablet 3 Past Week at Unknown time     donepezil (ARICEPT) 5 MG tablet Take 1 tablet (5 mg) by mouth At Bedtime 90 tablet 3 Past Week at Unknown time     Calcium Carbonate-Vitamin D (CALCIUM + D PO) 315 mg-200 IU tablet. Take 0.5 tablet by mouth daily with meals.   3/2/2018 at Unknown time     Multiple Vitamin (MULTIVITAMIN OR) Take 0.5 tablets by mouth daily    3/2/2018 at Unknown time     alendronate (FOSAMAX) 70 MG tablet Take 1 tablet (70 mg) by mouth every 7 days (Patient taking differently: Take 70 mg by mouth every 7 days On Thursdays.) 12 tablet 3 3/1/2018 at Unknown time     PHYSICAL EXAMINATION:  /63 (BP Location: Left arm)  Pulse 88  Temp 98.2  F (36.8  C) (Oral)  Resp 16  Ht 1.626 m (5' 4\")  Wt 46.2 kg (101 lb 12.8 oz)  SpO2 98%  Breastfeeding? No  BMI 17.47 kg/m2    General: Sleepy, weak   Neuro: No gross neuro deficits     CV: RRR  Resp: Non labored on room air   Abdomen: Soft, non tender to palpation except over the mass, non distended, no peritonitis  MSK: Warm and well perfused   Skin: Warm, not clammy   Psych: depressed mood     LABS:  Wbc 11.1, hgb 10.4, INR 1.30, Na 138, K 3.9    Imaging reviewed     Patient seen, findings and plan discussed with staff, Dr Cody.    Akila Forrest MD  Surgery Resident  [phone]    "

## 2018-03-06 ENCOUNTER — APPOINTMENT (OUTPATIENT)
Dept: OCCUPATIONAL THERAPY | Facility: CLINIC | Age: 83
DRG: 357 | End: 2018-03-06
Payer: MEDICARE

## 2018-03-06 ENCOUNTER — APPOINTMENT (OUTPATIENT)
Dept: PHYSICAL THERAPY | Facility: CLINIC | Age: 83
DRG: 357 | End: 2018-03-06
Payer: MEDICARE

## 2018-03-06 LAB — PREALB SERPL IA-MCNC: 4 MG/DL (ref 15–45)

## 2018-03-06 PROCEDURE — 40000193 ZZH STATISTIC PT WARD VISIT: Performed by: PHYSICAL THERAPIST

## 2018-03-06 PROCEDURE — 99223 1ST HOSP IP/OBS HIGH 75: CPT | Performed by: CLINICAL NURSE SPECIALIST

## 2018-03-06 PROCEDURE — 25000132 ZZH RX MED GY IP 250 OP 250 PS 637: Mod: GY | Performed by: STUDENT IN AN ORGANIZED HEALTH CARE EDUCATION/TRAINING PROGRAM

## 2018-03-06 PROCEDURE — A9270 NON-COVERED ITEM OR SERVICE: HCPCS | Mod: GY | Performed by: STUDENT IN AN ORGANIZED HEALTH CARE EDUCATION/TRAINING PROGRAM

## 2018-03-06 PROCEDURE — 97530 THERAPEUTIC ACTIVITIES: CPT | Mod: GO | Performed by: OCCUPATIONAL THERAPIST

## 2018-03-06 PROCEDURE — 40000133 ZZH STATISTIC OT WARD VISIT: Performed by: OCCUPATIONAL THERAPIST

## 2018-03-06 PROCEDURE — 12000008 ZZH R&B INTERMEDIATE UMMC

## 2018-03-06 PROCEDURE — 97535 SELF CARE MNGMENT TRAINING: CPT | Mod: GO | Performed by: OCCUPATIONAL THERAPIST

## 2018-03-06 PROCEDURE — 97116 GAIT TRAINING THERAPY: CPT | Mod: GP | Performed by: PHYSICAL THERAPIST

## 2018-03-06 PROCEDURE — 97110 THERAPEUTIC EXERCISES: CPT | Mod: GP | Performed by: PHYSICAL THERAPIST

## 2018-03-06 PROCEDURE — 97530 THERAPEUTIC ACTIVITIES: CPT | Mod: GP | Performed by: PHYSICAL THERAPIST

## 2018-03-06 RX ORDER — ACETAMINOPHEN 325 MG/1
650 TABLET ORAL AT BEDTIME
Status: DISCONTINUED | OUTPATIENT
Start: 2018-03-06 | End: 2018-03-08 | Stop reason: HOSPADM

## 2018-03-06 RX ORDER — SODIUM CHLORIDE 9 MG/ML
INJECTION, SOLUTION INTRAVENOUS CONTINUOUS
Status: DISCONTINUED | OUTPATIENT
Start: 2018-03-06 | End: 2018-03-07 | Stop reason: HOSPADM

## 2018-03-06 RX ORDER — LANOLIN ALCOHOL/MO/W.PET/CERES
3 CREAM (GRAM) TOPICAL AT BEDTIME
Status: DISCONTINUED | OUTPATIENT
Start: 2018-03-06 | End: 2018-03-08 | Stop reason: HOSPADM

## 2018-03-06 RX ORDER — LIDOCAINE 40 MG/G
CREAM TOPICAL
Status: DISCONTINUED | OUTPATIENT
Start: 2018-03-06 | End: 2018-03-07 | Stop reason: HOSPADM

## 2018-03-06 RX ADMIN — MEMANTINE HYDROCHLORIDE 5 MG: 5 TABLET, FILM COATED ORAL at 08:41

## 2018-03-06 RX ADMIN — MEMANTINE HYDROCHLORIDE 5 MG: 5 TABLET, FILM COATED ORAL at 19:05

## 2018-03-06 RX ADMIN — ACETAMINOPHEN 650 MG: 325 TABLET, FILM COATED ORAL at 21:07

## 2018-03-06 RX ADMIN — MELATONIN TAB 3 MG 3 MG: 3 TAB at 21:07

## 2018-03-06 RX ADMIN — DONEPEZIL HYDROCHLORIDE 5 MG: 5 TABLET, FILM COATED ORAL at 21:07

## 2018-03-06 NOTE — PLAN OF CARE
Problem: Patient Care Overview  Goal: Plan of Care/Patient Progress Review  Outcome: No Change  VSS. R PIV SL. Pt denies pain. BS hypoactive, passing gas. 1 small BM. Alarm on bed, triggered x1. Pt up to BR SBA with walker, education on call light usage. RLQ firm, tender. Pt slept through night.

## 2018-03-06 NOTE — CONSULTS
[facility], [place]    Palliative Care Consultation Note    Patient: Anca Alanis  Date of Admission:  3/3/2018    Requesting provider/team: Madai Roach MD  Reason for consult: Goals of care  Decisional support  Patient and family support    Recommendations:  - wants to pursue biopsy at this time so he can know more about what treatments there are for her with the goal to return to her normal function of being able to go with him to activities they both enjoy  -Would involve both , Alexei Alanis, and son, Alexei Alanis, in decision making process  -Recommend  and son receive more information about the risks and benefits of obtaining a biopsy    These recommendations have been discussed with primary team.    Thank you for the opportunity to participate in the care of this patient and family. Our team will follow. Please feel free to contact the on-call Palliative provider with any urgent needs.     PACO Springer CNS  Palliative Care Consult Team  Pager: [phone]    [facility] Inpatient Team Consult pager [phone] (M-F 8-4:30)  After-hours Answering Service [phone]   Palliative Clinic: [phone]       Assessment:  Anca Alanis is a 89 year old female who presents with lower abdominal pain, over the past week had a decline in function and spent most of the time in bed. 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    Symptoms: none at this time    Social:          Living situation: with        Support system:  (she tells me he is her boyfriend), children       Actual/Potential Caregiver:        Functional status:        Hobbies: going to exercise class and other outing with her     Mental Health: documented to have alzheimers    Spiritual/Baptism:    Spiritual background: Confucianist per documentation    Prognostic  Information: Discussed with  today the concern that the mass she has could be cancer. This has been discussed with .    Advance Care Planning:          Decision making capacity: Seems to not demonstrate full capacity for decision regarding biopsy of abdominal mass and is helpful to involve family that knows her best, health care directive lists  and son's as alternate decision makers.       Disease understanding: discussed with  and he understands she has a mass in her stomach and they do not know what it is       Goals of Care: discussed with  today the concern that she may have a cancer but we do not know for sure, his hope is for her to return to per prior quality of life (prior to her one week of being in bed), such as being able to make outings with him. He also very much wants more information about what the problem is and understand what treatment options there are for her condition.       Preferred way of decision making: he prefers to include their son, Alexei, in decision making       Health care directive: Yes       Health care agent: Alexei Alanis, Kris Bowen       Code Status:  Full Code       POLST There is no POLST (Physician orders for life-sustaining treatment) form on file for this patient    History of Present Illness   Sources of History:patient and electronic health record    Anca Alanis is a 89 year old female who presents with lower abdominal pain, over the past week had a decline in function and spent most of the time in bed. 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 She also has a history of Alzheimer disease and osteopetrosis.    Palliative care consulted 3/6 for goals of care for patient with RLQ mass, deemed not resectable, to discuss moving forward with work up and treatment    ROS:  Palliative Symptom Review (0=no symptom/no concern, 1=mild,  2=moderate, 3=severe):  Pain: 0  Fatigue: 0  Nausea: 0  Constipation: 0  Diarrhea: 0  Depressive Symptoms: 0  Anxiety: 0  Drowsiness: 0  Poor Appetite: 0  Shortness of Breath: 0  Insomnia: 0  Delirium: 1-2       Past Medical History:   Past Medical History:   Diagnosis Date     Cataracts, both eyes      Glaucoma suspect           Past Surgical History:   History reviewed. No pertinent surgical history.          Family History:   Family History   Problem Relation Age of Onset     Hypertension Sister      DIABETES Paternal Grandmother      DIABETES Son      Family history reviewed       Allergies:   Allergies   Allergen Reactions     Ciprofloxacin      Sulfa Drugs             Medications:   I have reviewed this patient's medication profile and medications given in the past 24 hours.  Hydromorphone 0.2 mg q2h PRN - x1 in last 24 hours           Physical Exam:   Vital Signs: Temp: 96.5  F (35.8  C) Temp src: Oral BP: 134/63 Pulse: 76 Heart Rate: 76 Resp: 16 SpO2: 94 % O2 Device: None (Room air)    Weight: 108 lbs 0 oz    Physical Exam:  Constitutional: Awake, alert, cooperative, no apparent distress  Lungs: No increased work of breathing  Neurologic: Awake, alert, oriented to self and place.  Neuropsychiatric: guarded  Skin: visualized intact and dry.       Data reviewed:     ROUTINE ICU LABS (Last four results)  CMP  Recent Labs  Lab 03/05/18  1743 03/04/18  0840 03/03/18  0944   NA  --  138 134   POTASSIUM  --  3.9 4.0   CHLORIDE  --  107 97   CO2  --  20 25   ANIONGAP  --  12 11   GLC  --   --  116*   BUN  --   --  33*   CR  --   --  0.83   GFRESTIMATED  --   --  64   GFRESTBLACK  --   --  78   ANH  --   --  9.1   ALBUMIN 1.8*  --   --      CBC  Recent Labs  Lab 03/05/18  0941 03/04/18  0008 03/03/18  0944   WBC 11.1* 11.3* 11.3*   RBC 3.30* 3.21* 3.89   HGB 10.4* 10.2* 12.5   HCT 32.1* 31.8* 38.3   MCV 97 99 99   MCH 31.5 31.8 32.1   MCHC 32.4 32.1 32.6   RDW 13.1 13.0 13.0    275 315     INR  Recent  Labs  Lab 03/05/18  0941 03/03/18  0944   INR 1.30* 1.27*     Arterial Blood GasNo lab results found in last 7 days.    PACO Springer CNS  Palliative Care Consult Team  Pager: [phone]    [facility] Inpatient Team Consult pager [phone] (M-F 8-4:30)  After-hours Answering Service [phone]  Palliative Clinic: [phone]     Total time spent was 70 minutes,  >50% of time was spent counseling and/or coordination of care regarding goals of care.

## 2018-03-06 NOTE — CONSULTS
Patient is on IR schedule 3/7/2018 for an US guided right pelvic mass biopsy.   Labs WNL for procedure.    Orders for NPO have been entered.   Consent will be done prior to procedure.    Please contact the IR charge RN at [phone] for estimated time of procedure.     Case discussed with Dr. San from IR and Dr. Patrick.    Tyesha Gay, SHERIF, APRN  Interventional Radiology  Phone: [phone]  Pager: [phone]

## 2018-03-06 NOTE — PLAN OF CARE
"Problem: Patient Care Overview  Goal: Plan of Care/Patient Progress Review  Outcome: No Change  [id number].  AVSS.  No complaints of pain.  Palliative at bedside to discuss goals of care with pt and , at this time pt and  are interested in returning Anca to functioning which would enable them to continue to enjoy life and activities together, therefore are interested in more information about biopsy and treatment.  Also interested in their two sons being more involved in risks/benefits discussion.  Otherwise, pt continues to be AOx4 though forgetful, but is more lethargic today.  Offered help with shower and said \"I have no energy for that\".  Reinforced need for improved nutrition, ate 100% of breakfast and 75% of lunch.  Up in chair for approx 3 hours, ambulates to BR, +BM.  Continue POC.      "

## 2018-03-06 NOTE — PLAN OF CARE
Problem: Patient Care Overview  Goal: Plan of Care/Patient Progress Review  Discharge Planner OT   Patient plan for discharge: home with assist pending progress in therapy, open to rehab  Current status: Pt CGA-Min A for standing ADLs, Pt min A sit <> stand, min A for functional transfers with FWW.  Barriers to return to prior living situation: deconditioning, pain  Recommendations for discharge: TCU   Rationale for recommendations: Pt presents with significant deconditioning and pain limiting I and safety in ADL/IADLs. Pt to benefit from skilled OT and PT in TCU setting to improve endurance, and I and safety in ADLs.       Entered by: Mechelle Warner 03/06/2018 3:38 PM

## 2018-03-06 NOTE — PLAN OF CARE
Problem: Patient Care Overview  Goal: Plan of Care/Patient Progress Review  Outcome: No Change  A &O ex to time. VSS on RA. Patient denies pain, but RLQ over mass seems to be very tender.  Abdomen soft with bowel sounds present, small smear per therapy.  voiding spont, calls.  Up with walker and SBA. Trans vaginal US done. RPIV SL.  Pt states she is very tired with no energy.  Continue with POC.

## 2018-03-06 NOTE — PROGRESS NOTES
"GENERAL SURGERY PROGRESS NOTE    89 year old yo female    Anca Alanis  MRN: [id number]  Admitted: 3/3/2018    Subjective: : No significant events overnight. Denies pain at rest. Tolerating diet. Admits sleep disturbance. Says she is receiving good care.     Objective:    /63  Pulse 76  Temp 96.5  F (35.8  C) (Oral)  Resp 16  Ht 1.626 m (5' 4\")  Wt 49 kg (108 lb)  SpO2 94%  Breastfeeding? No  BMI 18.54 kg/m2    INTAKE/OUTPUT:   I/O last 3 completed shifts:  In: 270 [P.O.:270]  Out: 200 [Urine:200]          PE:   Gen: Alert and oriented.  Abd: Abdomen S/ND. RLQ tenderness with palp  CVS: RRR,   Resp: in NAD, no extra WOB  Neuro: Pain is controlled.      LAB: Reviewed.   BMP  Recent Labs  Lab 03/04/18  0840 03/03/18  0944    134   POTASSIUM 3.9 4.0   CHLORIDE 107 97   CO2 20 25   ANIONGAP 12 11   GLC  --  116*   BUN  --  33*   CR  --  0.83   ANH  --  9.1     CBC  Recent Labs  Lab 03/05/18  0941   WBC 11.1*   HGB 10.4*      HCT 32.1*     LFT   Recent Labs  Lab 03/05/18  1743   ALBUMIN 1.8*     INR  Recent Labs  Lab 03/05/18  0941   INR 1.30*           IMAGING: reviewed   Recent Results (from the past 24 hour(s))   US Pelvic Complete with Transvaginal    Narrative    EXAM: Pelvic Ultrasound 3/5/2018 4:26 PM     COMPARISON: CT 3/4/2018 and 3/3/2018, MRI from 3/4/2018, and prior  ultrasound of 3/3/2018.    HISTORY:  Abnormal mass of possible ovarian etiology. Postmenopausal.    FINDINGS: Sonographic evaluation was technically challenging and the  patient deferred transvaginal examination.    The uterus was not well visualized transabdominally. The previously  seen complex solid/cystic right pelvic mass is redemonstrated with  internal vascularity demonstrated. The mass measures approximately 12  x 8 x 11 cm. Neither ovary is definitively identified.      Impression    IMPRESSION:   Technically limited transabdominal only sonographic examination of the  pelvis. Redemonstration of complex " right pelvic mass with internal  vascularity, again, highly concerning for malignancy. Neither ovary  was identified and therefore ovarian relationship with the mass cannot  be established.    I have personally reviewed the examination and initial interpretation  and I agree with the findings.    JESUS MITCHELL MD       ASSESSMENT/PLAN: 89 year old female with RLQ mass. Does not appear to involve bowel.       Diet: tolerating reg diet    Pulm: IS    Ambulation: up with assist     Neuro: Pain is controlled. Ordered melatonin 3mg QHS for sleep.     DVT prophylaxis: SCD    Anticipated discharge: 1-2 days.    Will discuss with oncology regarding feasibility of chemotherapy.      Patient was seen and discussed with staff Dr. Renee.    Johnathon Yost DO, MA  PGY-1 Resident

## 2018-03-06 NOTE — PLAN OF CARE
Problem: Patient Care Overview  Goal: Plan of Care/Patient Progress Review  Discharge Planner PT   Patient plan for discharge: TCU  Current status: PT 7B: Provided encouragement from  PT and spouse pt participated in session. Pt progressed to ambulating 50 ft, 30 ft with CGA, FWW and close WC follow. Seated rest breaks taken. Pt completed seated LE strength ex, requires ARIK for basic transfers and ARIK-MODA for bed mobility.  Barriers to return to prior living situation: weakness, reduced strength, walking < safe household distances  Recommendations for discharge: TCU  Rationale for recommendations: below baseline functional strength       Entered by: Tabitha Lees 03/06/2018 4:14 PM

## 2018-03-06 NOTE — PROGRESS NOTES
"Surgical Oncology Progress note    S:  No acute overnight events.  Pt seen at bedside resting comfortably. Pain well controlled     O:  /63  Pulse 76  Temp 96.5  F (35.8  C) (Oral)  Resp 16  Ht 1.626 m (5' 4\")  Wt 49 kg (108 lb)  SpO2 94%  Breastfeeding? No  BMI 18.54 kg/m2  Sleepy, NAD  Breathing non-labored  RRR  Soft, ND, no ttp  Distal extremities warm.         Intake/Output Summary (Last 24 hours) at 03/06/18 1029  Last data filed at 03/06/18 0200   Gross per 24 hour   Intake              270 ml   Output              200 ml   Net               70 ml       CEA< 0.5   29  Albumin 1.8      Imaging:  EXAM: Pelvic Ultrasound 3/5/2018 4:26 PM      COMPARISON: CT 3/4/2018 and 3/3/2018, MRI from 3/4/2018, and prior  ultrasound of 3/3/2018.     HISTORY:  Abnormal mass of possible ovarian etiology. Postmenopausal.     FINDINGS: Sonographic evaluation was technically challenging and the  patient deferred transvaginal examination.     The uterus was not well visualized transabdominally. The previously  seen complex solid/cystic right pelvic mass is redemonstrated with  internal vascularity demonstrated. The mass measures approximately 12  x 8 x 11 cm. Neither ovary is definitively identified.         IMPRESSION:   Technically limited transabdominal only sonographic examination of the  pelvis. Redemonstration of complex right pelvic mass with internal  vascularity, again, highly concerning for malignancy. Neither ovary  was identified and therefore ovarian relationship with the mass cannot  be established    A/P: Anca Alanis is a 89 year old female with an intra-abdominal mass of unknown etiology. Concern for mucinous neoplasm precludes IR image guided biopsy. We have been consulted to assist with workup and mgmt.  - Recommend palliative consult to discuss goals of care. If patient wants treatment then they will need tissue diagnosis of some sort before starting chemo. Otherwise, ok with discharge " home.   - Continue management per primary  - Surgical oncology will sign off    Seen and discussed with chief resident     Marco A Olivas MD  PGY-2, General Surgery  [phone]

## 2018-03-06 NOTE — PROGRESS NOTES
Social Work Services Progress Note    Hospital Day: 4  Date of Initial Social Work Evaluation:  3/3/18- please see for details  Collaborated with:  Chart review, team rounds, General Surgery team, pt, pt's  Orion    Data:  Pt is a 89 year old female admitted from home due to abdominal pain and was found to have an abdominal mass. Per General Surgery team pt may have a biopsy of this mass today or tomorrow.   PT/OT are currently recommending TCU for pt.     Intervention:  Ja met with pt and her  Orion in pt's room to introduce self, check in, and discuss d/c planning. Pt reported she is doing ok. Sw explained TCU recommendations and TCU cares and pt is agreeable to d/c to TCU if recommended with the plan of returning home after TCU. Sw explained general terms of insurance coverage for TCU as well as out of pocket expense for medical transport if needed.   Sw provided pt and [place] with a Medicare list of facilities near their home for them to review and sw will return tomorrow to obtain TCU choices and make referrals.   Pt reported feeling that she is getting all of her questions answered currently by her doctors and denied further sw needs or questions at this time.     Assessment:  See bedside RN, PT/OT, medical team notes    Plan:    Anticipated Disposition:  Facility:  TCU, location to be determined    Barriers to d/c plan:  Medical stability, bed availability/acceptance    Follow Up:  ja ALDRIDGE will continue to follow    FELIX Bell, MSTORI  7B   [phone] (pager) [phone]  3/6/2018

## 2018-03-07 ENCOUNTER — APPOINTMENT (OUTPATIENT)
Dept: PHYSICAL THERAPY | Facility: CLINIC | Age: 83
DRG: 357 | End: 2018-03-07
Payer: MEDICARE

## 2018-03-07 ENCOUNTER — APPOINTMENT (OUTPATIENT)
Dept: OCCUPATIONAL THERAPY | Facility: CLINIC | Age: 83
DRG: 357 | End: 2018-03-07
Payer: MEDICARE

## 2018-03-07 ENCOUNTER — APPOINTMENT (OUTPATIENT)
Dept: INTERVENTIONAL RADIOLOGY/VASCULAR | Facility: CLINIC | Age: 83
DRG: 357 | End: 2018-03-07
Attending: NURSE PRACTITIONER
Payer: MEDICARE

## 2018-03-07 LAB
BASOPHILS # BLD AUTO: 0 10E9/L (ref 0–0.2)
BASOPHILS NFR BLD AUTO: 0.3 %
CANCER AG19-9 SERPL-ACNC: 4 U/ML (ref 0–37)
DIFFERENTIAL METHOD BLD: ABNORMAL
EOSINOPHIL # BLD AUTO: 0.2 10E9/L (ref 0–0.7)
EOSINOPHIL NFR BLD AUTO: 2.4 %
ERYTHROCYTE [DISTWIDTH] IN BLOOD BY AUTOMATED COUNT: 13.2 % (ref 10–15)
GRAM STN SPEC: ABNORMAL
HCT VFR BLD AUTO: 31.3 % (ref 35–47)
HGB BLD-MCNC: 10.1 G/DL (ref 11.7–15.7)
IMM GRANULOCYTES # BLD: 0.1 10E9/L (ref 0–0.4)
IMM GRANULOCYTES NFR BLD: 0.9 %
INR PPP: 1.24 (ref 0.86–1.14)
LYMPHOCYTES # BLD AUTO: 0.7 10E9/L (ref 0.8–5.3)
LYMPHOCYTES NFR BLD AUTO: 10.3 %
MCH RBC QN AUTO: 31.4 PG (ref 26.5–33)
MCHC RBC AUTO-ENTMCNC: 32.3 G/DL (ref 31.5–36.5)
MCV RBC AUTO: 97 FL (ref 78–100)
MONOCYTES # BLD AUTO: 0.6 10E9/L (ref 0–1.3)
MONOCYTES NFR BLD AUTO: 9.1 %
NEUTROPHILS # BLD AUTO: 5.2 10E9/L (ref 1.6–8.3)
NEUTROPHILS NFR BLD AUTO: 77 %
NRBC # BLD AUTO: 0 10*3/UL
NRBC BLD AUTO-RTO: 0 /100
PLATELET # BLD AUTO: 238 10E9/L (ref 150–450)
RBC # BLD AUTO: 3.22 10E12/L (ref 3.8–5.2)
SPECIMEN SOURCE: ABNORMAL
WBC # BLD AUTO: 6.8 10E9/L (ref 4–11)

## 2018-03-07 PROCEDURE — A9270 NON-COVERED ITEM OR SERVICE: HCPCS | Mod: GY | Performed by: STUDENT IN AN ORGANIZED HEALTH CARE EDUCATION/TRAINING PROGRAM

## 2018-03-07 PROCEDURE — 87077 CULTURE AEROBIC IDENTIFY: CPT | Performed by: SURGERY

## 2018-03-07 PROCEDURE — 87075 CULTR BACTERIA EXCEPT BLOOD: CPT | Performed by: SURGERY

## 2018-03-07 PROCEDURE — 87186 SC STD MICRODIL/AGAR DIL: CPT | Performed by: SURGERY

## 2018-03-07 PROCEDURE — 12000008 ZZH R&B INTERMEDIATE UMMC

## 2018-03-07 PROCEDURE — 85025 COMPLETE CBC W/AUTO DIFF WBC: CPT | Performed by: SURGERY

## 2018-03-07 PROCEDURE — 27210903 IR ABDOMINAL RETROPERITONEAL BIOPSY

## 2018-03-07 PROCEDURE — 40000133 ZZH STATISTIC OT WARD VISIT: Performed by: OCCUPATIONAL THERAPIST

## 2018-03-07 PROCEDURE — 0W9H3ZX DRAINAGE OF RETROPERITONEUM, PERCUTANEOUS APPROACH, DIAGNOSTIC: ICD-10-PCS | Performed by: PHYSICIAN ASSISTANT

## 2018-03-07 PROCEDURE — 87205 SMEAR GRAM STAIN: CPT | Performed by: SURGERY

## 2018-03-07 PROCEDURE — 97535 SELF CARE MNGMENT TRAINING: CPT | Mod: GO | Performed by: OCCUPATIONAL THERAPIST

## 2018-03-07 PROCEDURE — 87076 CULTURE ANAEROBE IDENT EACH: CPT | Performed by: SURGERY

## 2018-03-07 PROCEDURE — 85610 PROTHROMBIN TIME: CPT | Performed by: SURGERY

## 2018-03-07 PROCEDURE — 97530 THERAPEUTIC ACTIVITIES: CPT | Mod: GP | Performed by: PHYSICAL THERAPIST

## 2018-03-07 PROCEDURE — 25000132 ZZH RX MED GY IP 250 OP 250 PS 637: Mod: GY | Performed by: STUDENT IN AN ORGANIZED HEALTH CARE EDUCATION/TRAINING PROGRAM

## 2018-03-07 PROCEDURE — 40000193 ZZH STATISTIC PT WARD VISIT: Performed by: PHYSICAL THERAPIST

## 2018-03-07 PROCEDURE — 36415 COLL VENOUS BLD VENIPUNCTURE: CPT | Performed by: SURGERY

## 2018-03-07 PROCEDURE — 88305 TISSUE EXAM BY PATHOLOGIST: CPT | Performed by: SURGERY

## 2018-03-07 PROCEDURE — 00000102 ZZHCL STATISTIC CYTO WRIGHT STAIN TC: Performed by: SURGERY

## 2018-03-07 PROCEDURE — 87070 CULTURE OTHR SPECIMN AEROBIC: CPT | Performed by: SURGERY

## 2018-03-07 PROCEDURE — 25000125 ZZHC RX 250: Performed by: PHYSICIAN ASSISTANT

## 2018-03-07 PROCEDURE — 00000155 ZZHCL STATISTIC H-CELL BLOCK W/STAIN: Performed by: SURGERY

## 2018-03-07 PROCEDURE — 88112 CYTOPATH CELL ENHANCE TECH: CPT | Performed by: SURGERY

## 2018-03-07 PROCEDURE — 97116 GAIT TRAINING THERAPY: CPT | Mod: GP | Performed by: PHYSICAL THERAPIST

## 2018-03-07 PROCEDURE — 0WBH3ZX EXCISION OF RETROPERITONEUM, PERCUTANEOUS APPROACH, DIAGNOSTIC: ICD-10-PCS | Performed by: PHYSICIAN ASSISTANT

## 2018-03-07 RX ORDER — LIDOCAINE HYDROCHLORIDE 10 MG/ML
1-30 INJECTION, SOLUTION EPIDURAL; INFILTRATION; INTRACAUDAL; PERINEURAL
Status: COMPLETED | OUTPATIENT
Start: 2018-03-07 | End: 2018-03-07

## 2018-03-07 RX ADMIN — LIDOCAINE HYDROCHLORIDE 7 ML: 10 INJECTION, SOLUTION EPIDURAL; INFILTRATION; INTRACAUDAL; PERINEURAL at 11:42

## 2018-03-07 RX ADMIN — MEMANTINE HYDROCHLORIDE 5 MG: 5 TABLET, FILM COATED ORAL at 19:14

## 2018-03-07 RX ADMIN — MELATONIN TAB 3 MG 3 MG: 3 TAB at 20:49

## 2018-03-07 RX ADMIN — ACETAMINOPHEN 650 MG: 325 TABLET, FILM COATED ORAL at 20:49

## 2018-03-07 RX ADMIN — DONEPEZIL HYDROCHLORIDE 5 MG: 5 TABLET, FILM COATED ORAL at 20:49

## 2018-03-07 RX ADMIN — MEMANTINE HYDROCHLORIDE 5 MG: 5 TABLET, FILM COATED ORAL at 07:45

## 2018-03-07 NOTE — PLAN OF CARE
Problem: Patient Care Overview  Goal: Plan of Care/Patient Progress Review  Discharge Planner OT   Patient plan for discharge: TCU  Current status: Pt min A for bed mobility, and min A supine > sit. Pt CGA for standing ADLs with set up, and pt ambulated ~ 20 ft total in room, with FWW and CGA.  Barriers to return to prior living situation: deconditioning, pain, medical status   Recommendations for discharge: TCU  Rationale for recommendations: Pt presents with deconditioning and pain limiting I and safety in ADLs, pt to benefit from further skilled OT and PT in TCU setting to increase endurance, and I and safety with ADLs.       Entered by: Mechelle Warner 03/07/2018 10:23 AM

## 2018-03-07 NOTE — PLAN OF CARE
Problem: Patient Care Overview  Goal: Plan of Care/Patient Progress Review  Discharge Planner PT   Patient plan for discharge: rehab  Current status: PT 7B: Pt participated in visit; continues to require gentle but firm encouragement for out of bed activities. Receiving a warm blanket after each session helps motivate pt. Pt ambulated with CGA and FWW x 65 ft, 40 ft, 105 ft. Pt requires CGA-ARIK for safe transfers and bed mobility.   Barriers to return to prior living situation: fatigue, gait < household distances, reduced strength with transfers.   Recommendations for discharge: TCU  Rationale for recommendations: below baseline functional strength and activity tolerance       Entered by: Tabitha Lees 03/07/2018 5:03 PM

## 2018-03-07 NOTE — PROGRESS NOTES
[facility], [place]   Palliative Care Daily Progress Note          Palliative Care was consulted for decisional support and goals of care. At this time goals are clear and there is minimal symptom burden, so we will sign off. Please feel free to reconsult us if we can be helpful in the future. Thank you for the opportunity to be involved in the care of this patient.    Cam Beaver  Palliative Care Consult Team  Pager: [phone]

## 2018-03-07 NOTE — PLAN OF CARE
Problem: Patient Care Overview  Goal: Plan of Care/Patient Progress Review  Pt appeared to sleep soundly overnight. Up to BR with SBA. Mepilix on coccyx. Bed alarm on, pt forgetful. Denies pain. VSS. Is NPO for US guided bx today at 1315. Will continue to monitor pt.

## 2018-03-07 NOTE — PROGRESS NOTES
"Social Work Services Progress Note    Hospital Day: 5  Date of Initial Social Work Evaluation:  3/3/18- please see for details  Collaborated with:  Chart review, team rounds, pt, pt's  Orion, nursing facilities    Data:  Pt is a 89 year old female admitted from home due to abdominal pain and was found to have an abdominal mass. Per team [place] pt is having a biopsy today.   PT/OT are currently recommending TCU for pt, which pt is agreeable to.     Intervention:  Sw attempted to meet with pt in pt's room but pt's  Orion was present and reported that pt went to a procedure about an hour and a half ago and hasn't returned yet.   Sw inquired as to whether or not he and pt had a chance to review the TCU list provided yesterday and Orion provided the following TCU choices based on their review:  - [facility] (p. [phone], f. [phone])- referral faxed via [employer], waiting to hear back.   - [facility] (p. [phone], f. [phone])- referral faxed via [employer], waiting to hear back.     Sw returned to pt's room and spoke with pt to check in and discuss TCU options. Pt reported being \"worn out\" and discussed wanting to go home but knows she needs to be here right now. Pt is still agreeable to TCU and is agreeable to referrals being made to the above facilities. Transportation options for d/c were discussed along with potential out of pocket expense for medical transport if needed. Pt asked \"what can I expect from here\" and sw explained being uncertain what her medical plan is but that the plan is for pt to go to TCU to get stronger before going home. Pt expressed understanding of this and both she and Orion denied having further questions or sw needs at this time.     Assessment:  See bedside RN, PT/OT, medical team notes    Plan:    Anticipated Disposition:  Facility:  TCU, location to be determined    Barriers to d/c plan:  Medical stability, determination of d/c medical needs, bed " availability/acceptance    Follow Up:  TBD, sw will continue to follow    FELIX Blel, MSW  7B   [phone] (pager) [phone]  3/7/2018

## 2018-03-07 NOTE — PROGRESS NOTES
SPIRITUAL HEALTH SERVICES  SPIRITUAL ASSESSMENT Progress Note (Palliative Focus)  [facility] ([place]) 7B    REFERRAL SOURCE: Palliative care spiritual assessment.    Visit with patient Anca Alanis and  Orion at bedside. Anca welcomed a visit, although she said she had no urgent spiritual care needs.     Anca and Orion are [employer] [facility]. Anca said that her rosa was important to her; she will decide whether or not to contact her parish. She and Orion told a few stories of their lives together that illustrated their support of one another.    Plan: I am available for spiritual support as needed.    Anya Cuadra  Palliative   Pager [phone]  [facility] Inpatient Team Consult pager [phone] (M-F 8-4:30)  After-hours Answering Service [phone]

## 2018-03-07 NOTE — PROGRESS NOTES
"GENERAL SURGERY PROGRESS NOTE    89 year old yo female    Anca Alanis  MRN: [id number]  Admitted: 3/3/2018    Subjective: : No significant events overnight. Denies pain at rest. Tolerating diet. Slept better with melatonin. Planned core biopsy today.      Objective:    /61 (BP Location: Left arm)  Pulse 85  Temp 97.3  F (36.3  C) (Oral)  Resp 16  Ht 1.626 m (5' 4\")  Wt 49 kg (108 lb)  SpO2 96%  Breastfeeding? No  BMI 18.54 kg/m2    INTAKE/OUTPUT:   I/O last 3 completed shifts:  In: 660 [P.O.:660]  Out: -         PE:   Gen: Alert and oriented.  Abd: Abdomen S/ND. RLQ tenderness with palp  CVS: RRR,   Resp: in NAD, no extra WOB  Neuro: Pain is controlled.      LAB: Reviewed.   BMP    Recent Labs  Lab 03/04/18  0840 03/03/18  0944    134   POTASSIUM 3.9 4.0   CHLORIDE 107 97   CO2 20 25   ANIONGAP 12 11   GLC  --  116*   BUN  --  33*   CR  --  0.83   ANH  --  9.1     CBC    Recent Labs  Lab 03/07/18  0720   WBC 6.8   HGB 10.1*      HCT 31.3*     LFT     Recent Labs  Lab 03/05/18  1743   ALBUMIN 1.8*     INR    Recent Labs  Lab 03/07/18  0322   INR 1.24*           IMAGING: reviewed   Recent Results (from the past 24 hour(s))   US Pelvic Complete with Transvaginal    Narrative    EXAM: Pelvic Ultrasound 3/5/2018 4:26 PM     COMPARISON: CT 3/4/2018 and 3/3/2018, MRI from 3/4/2018, and prior  ultrasound of 3/3/2018.    HISTORY:  Abnormal mass of possible ovarian etiology. Postmenopausal.    FINDINGS: Sonographic evaluation was technically challenging and the  patient deferred transvaginal examination.    The uterus was not well visualized transabdominally. The previously  seen complex solid/cystic right pelvic mass is redemonstrated with  internal vascularity demonstrated. The mass measures approximately 12  x 8 x 11 cm. Neither ovary is definitively identified.      Impression    IMPRESSION:   Technically limited transabdominal only sonographic examination of the  pelvis. Redemonstration " of complex right pelvic mass with internal  vascularity, again, highly concerning for malignancy. Neither ovary  was identified and therefore ovarian relationship with the mass cannot  be established.    I have personally reviewed the examination and initial interpretation  and I agree with the findings.    JESUS MITCHELL MD       ASSESSMENT/PLAN: 89 year old female with RLQ mass. Does not appear to involve bowel.       Diet: was NPO at midnight for core biopsy this afternoon with IR.     Fluids: NS @ 75 mL/hr    Pulm: IS    Ambulation: up with assist     Neuro: Pain is controlled. Melatonin 3mg QHS for sleep.     DVT prophylaxis: SCD    Anticipated discharge: 1-2 days with good recovery from biopsy.     Code Status: Full Code      Patient was seen and discussed with staff Dr. Renee.    Johnathon Yost DO, MA  PGY-1 Resident

## 2018-03-07 NOTE — PROGRESS NOTES
Patient Name: Anca Alanis  Medical Record Number: [id number]  Today's Date: 3/7/2018    Procedure: retroperitoneal biopsy  Proceduralist: Dariusz VELAZCO    Procedure start time: 1035  Puncture time: 1040  Procedure end time: 1100    Report given to: Krystyna NERI      Other Notes: Pt arrived to IR room 6 from . Pt denies any questions or concerns regarding procedure. Pt positioned supine and monitored per protocol. 4 biopsy samples obtained and sent to path; 35 cc purulent, foul odor fluid aspirated with 3 specimens sent to lab. Pt tolerated procedure without any noted complications. Pt transferred back to .

## 2018-03-07 NOTE — CONSULTS
Please re consult us when you have the preliminary tissue diagnosis.    Barney Miranda MD  Hematology Oncology fellow  [phone]

## 2018-03-07 NOTE — PROCEDURES
Interventional Radiology Brief Post Procedure Note    Procedure: IR ABDOMINAL RETROPERITONEAL BIOPSY    Proceduralist: ANAM Kitchen PA-C    Assistant: None    Time Out: Prior to the start of the procedure and with procedural staff participation, I verbally confirmed the patient s identity using two indicators, relevant allergies, that the procedure was appropriate and matched the consent or emergent situation, and that the correct equipment/implants were available. Immediately prior to starting the procedure I conducted the Time Out with the procedural staff and re-confirmed the patient s name, procedure, and site/side. (The Joint Commission universal protocol was followed.)  Yes    Medications   Medication Event Details Admin User Admin Time       Sedation: None. Local Anesthestic used    Findings: Completed ultrasound guided biopsy and aspiration of RIGHT lower abdomen/pelvic mass. Concern for mucinous cancer discussed with surgical team and report is that team/family wished to proceed for tissue diagnosis. An area that appeared extra-peritoneal on CT/US was targeted for sampling. Needle was placed into collection and core biopsy was attempted, yielding uncertain solid fragments, four attempts at core biopsy were made. Through the introducer cannula aspiration was performed with return of opaque, purulent appearing, foul-smelling viscous fluid. Dx: Pelvic mass. [place]  LOCAL    Estimated Blood Loss: Minimal    Fluoroscopy Time:  minute(s)    SPECIMENS: Fluid and/or tissue for Gram stain and culture, Core needle biopsy specimens sent for pathological analysis    Complications: 1. None     Condition: Stable    Plan:     Comments: See dictated procedure note for full details.    Raj Arzola PA-C

## 2018-03-07 NOTE — PLAN OF CARE
Problem: Patient Care Overview  Goal: Plan of Care/Patient Progress Review  Outcome: No Change  Vitals:    03/07/18 1045 03/07/18 1100 03/07/18 1208 03/07/18 1225   BP: 120/54 120/58 127/57 127/60   BP Location: Left arm Left arm Left arm Left arm   Pulse:   80    Resp: 14 14 14 14   Temp:   97.2  F (36.2  C)    TempSrc:   Oral    SpO2: 95% 95% 98% 95%   Weight:       Height:         AVSS, pt oriented to self, place and year, disoriented to situation, pt also forgetful. Went to IR this morning for biopsy of abdominal mass, dressing on abdomen CDI with no drainage. Voiding adequate, no BM this shift but passing flatus. Up with A-1 and walker. Continuing to monitor per POC.

## 2018-03-07 NOTE — PLAN OF CARE
Problem: Patient Care Overview  Goal: Plan of Care/Patient Progress Review  Outcome: No Change  Afebrile,vss. Ate 1/2 of dinner. Walked with assistance. Saline locked. Mepilex on butt. Bed alarm on-forgetful. Radiology consult ordered  for a bx.

## 2018-03-07 NOTE — PLAN OF CARE
Problem: Patient Care Overview  Goal: Plan of Care/Patient Progress Review  PT 7B: Initially RN reported pt may yet be on bedrest. Discussed plan to check back. RN reported pt off bedrest now. Will return later as schedule permits.

## 2018-03-08 ENCOUNTER — AMBULATORY - HEALTHEAST (OUTPATIENT)
Dept: ADMINISTRATIVE | Facility: CLINIC | Age: 83
End: 2018-03-08

## 2018-03-08 ENCOUNTER — APPOINTMENT (OUTPATIENT)
Dept: OCCUPATIONAL THERAPY | Facility: CLINIC | Age: 83
DRG: 357 | End: 2018-03-08
Payer: MEDICARE

## 2018-03-08 ENCOUNTER — MEDICAL CORRESPONDENCE (OUTPATIENT)
Dept: HEALTH INFORMATION MANAGEMENT | Facility: CLINIC | Age: 83
End: 2018-03-08

## 2018-03-08 VITALS
SYSTOLIC BLOOD PRESSURE: 148 MMHG | HEIGHT: 64 IN | BODY MASS INDEX: 17.07 KG/M2 | DIASTOLIC BLOOD PRESSURE: 62 MMHG | OXYGEN SATURATION: 96 % | HEART RATE: 80 BPM | RESPIRATION RATE: 16 BRPM | TEMPERATURE: 98.8 F | WEIGHT: 100 LBS

## 2018-03-08 PROCEDURE — 97535 SELF CARE MNGMENT TRAINING: CPT | Mod: GO | Performed by: OCCUPATIONAL THERAPIST

## 2018-03-08 PROCEDURE — 97530 THERAPEUTIC ACTIVITIES: CPT | Mod: GO | Performed by: OCCUPATIONAL THERAPIST

## 2018-03-08 PROCEDURE — 25000132 ZZH RX MED GY IP 250 OP 250 PS 637: Mod: GY | Performed by: STUDENT IN AN ORGANIZED HEALTH CARE EDUCATION/TRAINING PROGRAM

## 2018-03-08 PROCEDURE — A9270 NON-COVERED ITEM OR SERVICE: HCPCS | Mod: GY

## 2018-03-08 PROCEDURE — 40000133 ZZH STATISTIC OT WARD VISIT: Performed by: OCCUPATIONAL THERAPIST

## 2018-03-08 PROCEDURE — A9270 NON-COVERED ITEM OR SERVICE: HCPCS | Mod: GY | Performed by: STUDENT IN AN ORGANIZED HEALTH CARE EDUCATION/TRAINING PROGRAM

## 2018-03-08 PROCEDURE — 97110 THERAPEUTIC EXERCISES: CPT | Mod: GO | Performed by: OCCUPATIONAL THERAPIST

## 2018-03-08 PROCEDURE — 25000132 ZZH RX MED GY IP 250 OP 250 PS 637: Mod: GY

## 2018-03-08 RX ORDER — BISACODYL 10 MG
10 SUPPOSITORY, RECTAL RECTAL DAILY PRN
Qty: 30 SUPPOSITORY | DISCHARGE
Start: 2018-03-08 | End: 2018-03-21

## 2018-03-08 RX ORDER — ACETAMINOPHEN 325 MG/1
650 TABLET ORAL AT BEDTIME
Qty: 100 TABLET | DISCHARGE
Start: 2018-03-08

## 2018-03-08 RX ORDER — AMOXICILLIN AND CLAVULANATE POTASSIUM 500; 125 MG/1; MG/1
1 TABLET, FILM COATED ORAL EVERY 12 HOURS SCHEDULED
Status: DISCONTINUED | OUTPATIENT
Start: 2018-03-08 | End: 2018-03-08 | Stop reason: HOSPADM

## 2018-03-08 RX ORDER — LANOLIN ALCOHOL/MO/W.PET/CERES
3 CREAM (GRAM) TOPICAL AT BEDTIME
DISCHARGE
Start: 2018-03-08 | End: 2018-03-21

## 2018-03-08 RX ORDER — AMOXICILLIN AND CLAVULANATE POTASSIUM 500; 125 MG/1; MG/1
1 TABLET, FILM COATED ORAL EVERY 12 HOURS
Qty: 14 TABLET | Refills: 0 | DISCHARGE
Start: 2018-03-08 | End: 2018-03-15

## 2018-03-08 RX ORDER — POLYETHYLENE GLYCOL 3350 17 G/17G
17 POWDER, FOR SOLUTION ORAL DAILY PRN
Qty: 7 PACKET | DISCHARGE
Start: 2018-03-08 | End: 2018-03-21

## 2018-03-08 RX ADMIN — MEMANTINE HYDROCHLORIDE 5 MG: 5 TABLET, FILM COATED ORAL at 08:12

## 2018-03-08 RX ADMIN — AMOXICILLIN AND CLAVULANATE POTASSIUM 1 TABLET: 500; 125 TABLET, FILM COATED ORAL at 13:40

## 2018-03-08 NOTE — PROGRESS NOTES
"Social Work Services Progress Note    Hospital Day: 6  Date of Initial Social Work Evaluation:  3/3/18- please see for details  Collaborated with:  Chart review, team rounds, General Surgery team, nursing facilities, pt, pt's  Orion    Data:  Pt is a 89 year old female admitted from home due to abdominal pain and was found to have an abdominal mass, which was biopsied yesterday. PT/OT are currently recommending TCU for pt, which pt is agreeable to and referrals have been made.   Per General Surgery team pt could be ready to d/c today but more likely Friday. Brant later received update that pt is ready to d/c today.   Per General Surgery team they are uncertain if pt's mass is cancerous at this point and any further treatment will be dependant on a follow up appointment in two weeks.     Intervention:  Brant contacted the following TCUs to follow up on referrals:  - [facility] (p. [phone], f. [phone])-  left vm for Crystal in admissions, waiting to hear back. Birdie Su called back reporting there are beds available and she is willing to review referral but didn't receive it. Brant re-faxed referral, waiting to hear back. Brant later called Crystal and left vm updating her that pt is now ready to d/c today, waiting to hear back. Birdie called reporting pt is accepted, however the computer system is currently down at facility and staff are unable to enter orders so cannot admit pt until this is fixed. Crystal will call back by 3pm with update on this. Birdie Su called back reporting that the computer system is back up and running. Birdie Su reported that if at any point while pt is at TCU cancer treatment is started pt would have to be taken off of Medicare because the facility could not \"sustain\" that cost.   - [facility] (p. [phone], f. [phone])- left vm for Delmy in admissions, waiting to hear back. Delmy reported pt is accepted for today and can arrive anytime before 6.     Brant " "spoke with General Surgery team and updated them on accepting TCU for today. Team reported they need to figure out pt's abx plan and will update sw when this is obtained, waiting to hear back before arranging transport. Team reported pt will be on oral augmentin for 7 days.     Ja met with pt and pt's  Orion in pt's room to check in and provide update. When asked how pt is doing she reported \"what you see is what you get\". Sw provided update on pt being accepted at both choice facilities and that computer system is down at [facility]. Orion reported that first choice would be [facility] but if by 3pm the computer system isn't fixed they are ok with pt going to [facility] as well. Pt is agreeable to this as well. Sw assessed transportation plans and pt and Orion opted to have w/c transport arranged via [employer] and were reminded that pt's insurance likely will not cover this expense and they expressed understanding.     PAS completed in anticipation of d/c- confirmation code: [id number].    Assessment:  See bedside RN, PT/OT, medical team notes    Plan:    Anticipated Disposition:  Facility:  TCU, location to be determined    Barriers to d/c plan:  Medical stability, bed availability/acceptance    Follow Up:  ja ALDRIDGE will continue to follow    FELIX Bell, MSW  7B   [phone] (pager) [phone]  3/8/2018          "

## 2018-03-08 NOTE — PLAN OF CARE
Problem: Patient Care Overview  Goal: Plan of Care/Patient Progress Review  Outcome: No Change  Temp 99.7, vss.  Bx site dry. Denies pain. Had a loose stool. Voiding but misses the pan. Ate a sandwich and 1/2 soup. Forgetful, bed alarm on.

## 2018-03-08 NOTE — PLAN OF CARE
Problem: Patient Care Overview  Goal: Plan of Care/Patient Progress Review  Outcome: Adequate for Discharge Date Met: 03/08/18  Pt discharged from  at 1745.  AVSS, no complaints of pain.  Receiving TCU, [facility], was given report by day RN.  Pt left with all her belongings including watch, hat, clothes.  Her hearing aid was given to her .  Follow-up appointment information was given to , Orion.  DC to TCU via [facility] transport.

## 2018-03-08 NOTE — PLAN OF CARE
Problem: Patient Care Overview  Goal: Plan of Care/Patient Progress Review  Outcome: No Change  Afeb, vitals stable, 02 sats 95% room air, denies pain, biopsy site RLQ dry and intact, belly soft, positive bowel sounds, lungs clear, forgetful at times, reminded multiple time to use call light and not to get out of bed by her self, bed alarm on, did get up x1 without assist, reinforce the need to use call light again, voiding in good amts, misses hat, iv saline locked, offers no further c/o, appeared to rest/sleep between cares,

## 2018-03-08 NOTE — PLAN OF CARE
Problem: Patient Care Overview  Goal: Plan of Care/Patient Progress Review  Discharge Planner OT   Patient plan for discharge: TCU  Current status: pt CGA ambulating 240 feet today, however, pt with significant fatigue and requiring moderate encouragement to participate in OOB activity.   Barriers to return to prior living situation: deconditioning, decreased motivation for mobility.   Recommendations for discharge: TCU  Rationale for recommendations: pt currently with deconditioning and decreased motivation to mobilize. Pt will benefit from structured and skilled PT/OT to regain functional endurance as well as AE education to maximize ADL I/safety.        Entered by: Steve Lynn 03/08/2018 9:07 AM

## 2018-03-08 NOTE — PLAN OF CARE
Problem: Pain, Acute (Adult)  Goal: Identify Related Risk Factors and Signs and Symptoms  Related risk factors and signs and symptoms are identified upon initiation of Human Response Clinical Practice Guideline (CPG).   Outcome: No Change  Vitals:    03/07/18 1900 03/08/18 0033 03/08/18 0417 03/08/18 0723   BP: 119/59 114/56 132/65 141/58   BP Location:  Left arm Left arm Left arm   Pulse:       Resp: 16 16 16 16   Temp: 99.7  F (37.6  C) 96.7  F (35.9  C) 96  F (35.6  C) 97.1  F (36.2  C)   TempSrc:  Axillary Axillary Oral   SpO2: 94% 95% 95% 93%   Weight:       Height:       AVSS. Alert and oriented x3 but forgetful. Bed/chair alarm on for safety. No c/o pain or nausea. Fair po intake. OOB to chair with assist of one and walker. Has being up walking to bathroom and unit with walker and SBA. Voiding. Had x 2 BMs. Stable. Plan is for a possible discharge today to TCU when a bed is avilable. Continue with current orders.

## 2018-03-08 NOTE — PROGRESS NOTES
Social Work Services Discharge Note      Patient Name:  Anca Alanis     Anticipated Discharge Date:  3/8/18    Discharge Disposition:   TCU:  [address]   P. [phone], F. [phone]     Pre-Admission Screening (PAS) online form has been completed.  The Level of Care (LOC) is:  Determined  Confirmation Code is:  [id number]  Patient/caregiver informed of referral to [facility] for Pre-Admission Screening for skilled nursing facility (SNF) placement and to expect a phone call post discharge from SNF.     Additional Services/Equipment Arranged:  W/c transport arranged via [employer] ([phone]) for today at 5:45pm.      Patient / Family response to discharge plan:  Pt and  Orion are agreeable     Persons notified of above discharge plan:  Pt, pt's  Orion, bedside nurse, charge nurse, [facility] receiving facility, General Surgery team    Staff Discharge Instructions:  Please fax discharge orders and signed hard scripts for any controlled substances.  Please print a packet and send with patient.     CTS Handoff completed:  YES    Medicare Notice of Rights provided to the patient/family:  YES    FELIX Bell, MSW  7B   [phone] (pager) [phone]  3/8/2018

## 2018-03-08 NOTE — DISCHARGE SUMMARY
[facility]  Discharge Summary  General Surgery     Anca Alanis MRN# [id number]   YOB: 1924 Age: 89 year old     Date of Admission:  3/3/2018  Date of Discharge::  3/8/2018  Admitting Physician:  Radha Mckeon MD  Discharge Physician:  Rafat Renee  Primary Care Physician:        Rafat Richards          Admission Diagnoses:   Abdominal mass, unspecified abdominal location [R19.00]          Discharge Diagnosis:   Abdominal mass         Procedures:     IR abdominal retroperitoneal biopsy on 3/7/18          Brief History of Illness:   89 year old female with PMH significant for dementia and history of diverticulitis here with 4 day history of abdominal pain. Pt with significant dementia and not a reliable historian. History obtained jointly with pt and her . States pain in RLQ about past 3-4 days. Pain mild to moderate. No nausea/vomiting, no fever/chills. Bowel habits and stool normal. Patient denies ever noticing any mass or swelling the area. Patient's  states that he did not pay attention to that area in the past. No past surgical history in the abdomen. No FH or personal history of IBD, GI cancer. Never had colonoscopy. Unknown if lost weight recently.           Hospital Course:   Her CT scan showed a suspicious mass in the right lower quadrant. She had a gastrograffin challenge which showed that the contrast to have passed through the bowel. Hence, a CT scan was repeated with oral contrast. It showed a multi cystic mass not comprising of bowel loops but suspicious for appendiceal or ovarian pathology, a possibility of the collection being an abscess was also suggested. Surg Onc was consulted and suggested tissue biopsy. Her tumor markers were not suggestive of a malignancy. Her IR guided biopsy was sent for pathology and pelvic fluid was sent for culture. The pelvic fluid showed a moderate growth of gram negative rods and she was started on  "augmentin.     She was transferred to TCU on March 8, 2018 as per PT/OT evaluation. At the time of discharge she was tolerating diet, mobilizing with assist, voiding without issues and had adequate return of bowel function.            Day of Discharge Physical Exam:   Blood pressure 141/58, pulse 80, temperature 97.1  F (36.2  C), temperature source Oral, resp. rate 16, height 1.626 m (5' 4\"), weight 45.4 kg (100 lb), SpO2 93 %, not currently breastfeeding.    Gen: AAOx3, NAD  Pulm: Non-labored breathing  Abd: Soft, no tenderness at the biopsy site, mass palpable in the right lower quadrant  Ext:  Warm and well-perfused         Final Pathology Result:   Pending at time of discharge           Discharge Instructions and Follow-Up:     Discharge Procedure Orders  General info for SNF   Order Comments: Length of Stay Estimate: Short Term Care: Estimated # of Days <30  Condition at Discharge: Stable  Level of care:skilled   Rehabilitation Potential: Fair  Admission H&P remains valid and up-to-date: Yes  Recent Chemotherapy: N/A  Use Nursing Home Standing Orders: Yes     Intake and output   Order Comments: Every shift     Glucose monitor nursing POCT   Order Comments: Before meals and at bedtime     Daily weights   Order Comments: Call Provider for weight gain of more than 2 pounds per day or 5 pounds per week.     Follow Up and recommended labs and tests   Order Comments: Follow up with primary care provider in 1 week for change in meds follow up.  No follow up labs or test are needed.  Follow up with Dr. Renee in 2 weeks to follow up on pathology results and advise further management.     Additional Discharge Instructions   Order Comments: Discharge Instructions  Activity  - No activity restrictions  - Do not operate a motor vehicle while taking narcotic pain medications    Medications  - Do not take any additional Tylenol (acetaminophen) while using a narcotic pain medication which includes acetaminophen  - Do not " take more than 4,000mg of acetaminophen in any 24 hour period, as this can cause liver damage  - Take stool softeners such as Senna or Miralax while you are using narcotics, but stop if you develop diarrhea  - Wean yourself off of narcotic pain medications    Follow-Up:  - Call your primary care provider to touch base regarding your recent admission.     - Call or return sooner than your regularly scheduled visit if you develop any of the following: fever >101.5, uncontrolled pain, uncontrolled nausea or vomiting, as well as increased redness, swelling, or drainage from your wound.   -  A nurse from the General Surgery Clinic will contact you 24 hours, or next business day, after your discharge from the hospital.  If you have questions or to schedule a follow up appointment please call the General Surgery Clinic at [phone].  Call [phone] and ask to speak with the Surgery resident on call if you are having troubles in the evenings, at night, or on the weekend.  -  If you are receiving home care please inform your home care nurse of our contact number.     Reason for your hospital stay   Order Comments: Abdominal Mass     Activity - Up with nursing assistance   Order Specific Question Answer Comments   Is discharge order? Yes      Activity - Ambulate in hallway   Order Comments: Every shift   Order Specific Question Answer Comments   Is discharge order? Yes      Full Code     Physical Therapy Adult Consult   Order Comments: Evaluate and treat as clinically indicated.    Reason:  Deconditioning     Occupational Therapy Adult Consult   Order Comments: Evaluate and treat as clinically indicated.    Reason:  Deconditioning     Fall precautions     Advance Diet as Tolerated   Order Comments: Follow this diet upon discharge: Orders Placed This Encounter     Snacks/Supplements Adult: Ensure Plus (Adult); Between Meals     Room Service     Regular Diet Adult   Order Specific Question Answer Comments   Is discharge  order? Yes               Home Health Care:   Not needed           Discharge Disposition:   Discharged to assisted living      Condition at discharge: Stable         Consultations:   MEDICATION HISTORY IP PHARMACY CONSULT  VASCULAR ACCESS CARE ADULT IP CONSULT  INTERVENTIONAL RADIOLOGY ADULT/PEDS IP CONSULT  PHYSICAL THERAPY ADULT IP CONSULT  OCCUPATIONAL THERAPY ADULT IP CONSULT  OCCUPATIONAL THERAPY ADULT IP CONSULT  SURGICAL ONCOLOGY ADULT IP CONSULT  PALLIATIVE CARE ADULT IP CONSULT  HEMATOLOGY & ONCOLOGY IP CONSULT  INTERVENTIONAL RADIOLOGY ADULT/PEDS IP CONSULT  PHYSICAL THERAPY ADULT IP CONSULT  OCCUPATIONAL THERAPY ADULT IP CONSULT         Imaging Studies:     Results for orders placed or performed during the hospital encounter of 03/03/18   CT Abdomen Pelvis w Contrast    Narrative    EXAMINATION: CT ABDOMEN PELVIS W CONTRAST  3/3/2018 10:58 AM      CLINICAL HISTORY: rlq mass, pain, possible hernia;     COMPARISON: CT 6/27/2005    PROCEDURE COMMENTS: CT of the abdomen was performed with 59cc of  Isovue 370 intravenous contrast. Coronal and sagittal reformatted  images were obtained.    FINDINGS:    Abdomen and pelvis:  Conglomeration of loops of bowel in the right lower quadrant which  appears to protrude anteriorly along the abdominal wall musculature  though does not appear to herniate through a muscular/fascial defect.  These loops of bowel do not appear abnormally distended, or  demonstrate abnormal enhancement or wall thickening. No pneumatosis.  No inflammatory changes or hyperemia in this region. There is no  proximal upstream dilatation of small bowel. Findings likely suggest  weakening of the anterior abdominal wall musculature with some  prominent outpouching. The right inguinal canal is without evidence of  herniated bowel. Metallic artifact overlies the lower abdomen and  somewhat obscures evaluation, particularly around image 260 of series  3. There is extensive colonic diverticulosis without  diverticulitis.  Appendix is not visualized.    No focal abnormality in the liver, spleen, gallbladder, adrenal  glands, or pancreas. Mildly prominent pancreatic duct. Mild  intrahepatic bile duct dilation. Kidneys are symmetric without  hydronephrosis or nephrolithiasis. The bladder is relatively  decompressed. Gynecologic structures are unremarkable. There is trace  fluid in the pelvis. Tortuous abdominal aorta with scattered  calcifications though no aneurysmal dilatation. Portal vein is patent.  No lymphadenopathy.    Lower thorax: Atelectasis in the lung bases, right middle lobe and  lingula. Interlobular septal thickening with subpleural reticulation.  Coronary artery calcifications. Mitral annulus calcifications. Aortic  valve calcifications. No pericardial effusion. Mild cardiomegaly.    Osseous structures:   There is no acute fracture or suspicious bony lesion. Severe  multilevel degenerative changes of the lumbar spine. Convex right  lumbar scoliotic curvature. Diffuse osteopenia. Degenerative changes  of the hips.      Impression    IMPRESSION:  1. Conglomeration of nondilated small bowel loops in the right lower  quadrant which appears to outpouch along focal segment of the low  midline abdominal wall musculature which appears lax but the bowel  does not herniate through the abdominal wall. There is no proximal  dilatation of small bowel to suggest obstruction. No loops of bowel  are seen within the inguinal canal.  2. Colonic diverticulosis without diverticulitis.  3. Extensive degenerative changes of the lumbar spine. Diffuse  osteopenia.  4. Mild cardiomegaly and interstitial pulmonary edema.    Dr. Lozano spoke with Dr. Iglesias of the emergency department at  approximately 11:30 regarding these results, which which she  verbalized understanding    I have personally reviewed the examination and initial interpretation  and I agree with the findings.    ELLY HENRY MD ([place])   US Abdomen Pelvis Duplex  Limited     Value    Radiologist flags (Urgent)     Findings suspicious for right lower quadrant localized    Narrative    EXAMINATION: US ABDOMEN OR PELVIS DOPPLER LIMITED, 3/3/2018 3:23 PM     COMPARISON: CT 3/3/2018    HISTORY: RLQ abdominal wall mass;     FINDINGS: Doppler ultrasound of right lower quadrant swelling.    There appear to be multiple hypoechoic structures which appear to be  bowel loops, with stasis of intraluminal contents. No peristalsis was  identified. Normal peristalsis is identified in the left lower  quadrant. There appears to be normal blood flow to the wall.        Impression    IMPRESSION:  Right lower quadrant soft tissue swelling, evaluated with previous CT,  appear to be multiple bowel loops which are aperistaltic with stasis  of intraluminal contents. Cannot exclude obstruction. Gastrografin  study may be considered for further evaluation. Differential diagnosis  for such an appearance includes a necrotic mass, which is however  considered less likely.    [Urgent Result: Findings suspicious for right lower quadrant localized  ileus versus obstruction. Less likely necrotic mass. Consider  Gastrografin study for further evaluation.]    Finding was identified on 3/3/2018 3:42 PM.     Dr. Mckeon was contacted by Dr. Jv Luz at 3/3/2018 3:42  PM and verbalized understanding of the urgent finding.      I have personally reviewed the examination and initial interpretation  and I agree with the findings.    LENY MAJOR MD   XR Abdomen Port 1 View    Narrative    XR ABDOMEN PORT 1 VW  3/4/2018 2:13 AM    History:  Gastrografin challenge; .     Comparison: CT AP dated 3/3/2018    Findings:   Supine abdominal radiograph. Gastrografin challenge. Contrast material  within the colon up to the level of the rectum. Colonic diverticulosis  particularly at the sigmoid colon. Nonobstructive bowel gas pattern.  No evidence for contrast material within small bowel loops. Paucity  of  gas within the small bowel loops. No pneumatosis. No portal venous  gas.    Right convex curvature at the center of L2 with asymmetric disc space  narrowing. Degenerative changes of the lumbar spine. Evaluation of  free air is limited in the supine position.    Contrast is visualized in the left main bronchus and branches,  concerning for aspiration.      Impression    IMPRESSION:  1.  Gastrografin challenge. Contrast material within the colon up to  the level of the rectum. Sigmoid colon diverticulosis.   2.  Nonobstructive bowel gas pattern. Paucity of gas within the small  bowel loops. No evidence for contrast material within small bowel  loops.  3.  Contrast is partially visualized in the left main bronchus and its  branches, concerning for aspiration.    I have personally reviewed the examination and initial interpretation  and I agree with the findings.    SUPA MOFFETT MD   MR Abdomen w/o & w Contrast    Addendum: 3/5/2018    Addendum: On further evaluation, while this is most likely a mass, an  abscess is also a consideration.  For example, a perforated appendix  could potentially look like this. Recommend biopsy. If this is  infection, it may be the amenable to percutaneous drainage.    LENY MAJOR MD      Narrative    MRI ABDOMEN    CLINICAL HISTORY:  Abdominal right lower quadrant mass with  tenderness, concern for hernia versus cystic mass.    TECHNIQUE:  Images were acquired with and without intravenous contrast  through the abdomen. The following MR images were acquired: TrueFISP,  multiplanar T2 weighted, axial fat-saturated T1, diffusion-weighted.  Multiplanar T1-weighted images with fat saturation were before  contrast administration and at multiple time points following the  administration of intravenous contrast. The field-of-view for the  postcontrast sequences was tailored to include the lower abdomen to  include the suspicious region. The liver and upper abdominal  viscera  were not fully included in all of the postcontrast axial series.  Contrast dose: 4.4 cc of Gadavist    FINDINGS:    Comparison study: CT and ultrasound from 3/3/2018    The upper abdominal viscera were only included on the coronal images.  The liver, spleen, gallbladder, pancreas, and kidneys demonstrate no  obvious abnormalities, but are incompletely evaluated.    Bowel: In the right lower quadrant, in the region of interest, there  is a cluster of dilated cystic structures with a nearby ventral  abdominal hernia. These areas are peripherally enhancing, appear to  have bowel signature peripherally, but are overall indeterminant. The  diffusion-weighted images demonstrate clear restricted diffusion  within this multicystic area. There is no proximal or distal small  bowel dilation. The remaining bowel demonstrates normal bowel wall  enhancement. The visualized large bowel is within normal limits.  Colonic diverticula are noted, without evidence of diverticulitis. No  significant free fluid is identified.    Bones and soft tissues: There are degenerative changes in the lumbar  spine which one more completely included in the evaluation on the CT.  There is convex right rotoscoliotic curvature of the lumbar spine at  approximately the level of L2, with multilevel wedge compression  deformities. No suspected acute fracture identified.    Probable hernia in the right lower quadrant ventral abdominal wall is  ill-defined.      Impression    IMPRESSION:  1. Multicystic mass in the right lower quadrant is highly concerning  for tumor. Differential includes such entities as mucinous appendiceal  cancer or right ovarian cancer. Infection is unlikely.  2. Colonic diverticula.  3. The protocol is limited and primarily performed to evaluate the  lower abdominal wall finding. The upper abdominal viscera were not  fully included in all of the sequences, and therefore incompletely  evaluated, although no gross abnormality  is identified.    Final result: Right lower quadrant mass most likely tumor.    [Preliminary result:: Possible hernia containing mildly dilated small  bowel versus multicystic abscess versus multicystic necrotic mass]    Finding was identified on 3/4/2018 1:55 PM.     The case was discussed with the surgery attending Dr. Amina Waite  by the radiology resident Dr. Hollis at 3/4/2018 3:10 PM at the  reading station. The indeterminate nature of the mass was discussed,  as well as the concern for possible abscess, possible mass, and  possible incomplete bowel obstruction. The plan for a small bowel  follow-through with oral CT contrast and a timed CT of the abdomen was  discussed.    I have personally reviewed the examination and initial interpretation  and I agree with the findings.    LENY MAJOR MD   XR Abdomen Port 1 View    Narrative    XR ABDOMEN PORT 1 VW  3/4/2018 5:54 PM    History: Cystic right lower quadrant mass interval exam following oral  contrast administration.    Comparison: Numerous radiographs, CT, and MRI from the same day and  the prior day    Findings:   Single portable supine view of the abdomen is obtained. The gastric  tube is in stable position projecting over the stomach over left upper  quadrant. There is residual hyperdense contrast within the colon.  There is now orally administered contrast in small bowel loops  projecting over the mid pelvis. Stable spinal degenerative changes.      Impression    IMPRESSION:  Orally administered contrast is now in the small bowel loops  projecting over the mid pelvis. The CT technologist will call the  patient to undergo a pelvis CT.    I have personally reviewed the examination and initial interpretation  and I agree with the findings.    LENY MAJOR MD   CT Abdomen Pelvis w/o Contrast    Addendum: 3/5/2018    Addendum: On further evaluation, mass is most likely but an abscess  cannot be ruled out. For example, this could be a  response from a  perforated appendix.    LENY MAJOR MD      Narrative    EXAMINATION: CT ABDOMEN PELVIS W/O CONTRAST  3/4/2018 6:36 PM      CLINICAL HISTORY: Please assess RLQ; Small bowel vs. abscess vs. mass;      COMPARISON: MRI from the same day, multiple radiographs from the same  day, CT and ultrasound from the prior day    PROCEDURE COMMENTS: CT of the abdomen was performed without contrast.  Coronal and sagittal reformatted images were obtained.    FINDINGS:  Lower thorax:   Small bilateral pleural effusions with overlying atelectasis.    Abdomen and pelvis:  There is an NG tube present with the tip in the stomach. There is  radiodense contrast scattered intraluminal position throughout the  small and large bowel. In the region of interest, the right lower  quadrant, the mass that was previously indeterminate does not contain  oral contrast. This mass measures up to at least 9.8 x 7.4 x 8.8 cm.  As there is contrast distally in the large bowel and scattered  throughout the proximal small bowel, this confirms that this mass does  not communicate with the small bowel. Along the superior aspect of the  mass there is a curvilinear hyperdensity which could represent  contrast within and an effaced distal small bowel loop. There is no  other extraluminal collection to suggest perforation. The mass  continues to protrude through a focal thinning in the right lower  quadrant abdominal wall, and may be partially within a spigelian  hernia.    Otherwise, the liver, gallbladder, spleen, and pancreas have a  relatively normal appearance. There is diffuse anasarca. There is  dilute gadolinium in the renal collecting systems and bladder from the  MRI performed earlier on the same day.    Osseous structures:   Diffuse degenerative changes. No acute fracture.      Impression    IMPRESSION:  1. Multi cystic 9.8 cm mass in the right lower quadrant does not  appear to communicate with the bowel. This mass is now shown  to be  adjacent to the terminal ileum/cecum. 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  2. A portion of the mass may be within a laxity in the anterior  abdominal wall, possibly partially within a spigelian hernia.  3. Small bilateral pleural effusions with overlying atelectasis.  4. Otherwise stable appearance of the remaining abdominal viscera,  which were better characterized on the recent contrast-enhanced exams.      A preliminary report indicating that contrast did not go anterior the  area of interest, and that the appearance is favored to represent a  malignancy was given to the surgery resident Dr. Castañeda by the  radiology resident Dr. Hollis by telephone at approximately 0715 on  the exam date.    I have personally reviewed the examination and initial interpretation  and I agree with the findings.    LENY MAJOR MD   XR Abdomen Port 1 View    Addendum: 3/4/2018    Addendum: Subsequent imaging demonstrated right lower quadrant cystic  structure to be a mass and not small bowel.     LENY MAJOR MD      Narrative     Examination:  XR ABDOMEN PORT 1 VW     Date:  3/4/2018 4:05 PM      Clinical Information: NG tube placement;      Additional Information: Patient with probable right lower quadrant  hernia of small bowel.    Comparison: Abdominal x-ray 3/3/2018    Findings:   Nasogastric tube courses into the expected location of the stomach.   Contrast is demonstrated in the transverse colon. Unfortunately the  pelvis is not captured. Scoliotic curvature convex right lumbar spine.      Impression    Impression:  1. Nasogastric tube in good position.    2. Right lower quadrant pelvic bulge, thought to be herniated small  bowel, unfortunately not captured on this image.  3. Oral contrast has progressed in the descending colon.    LENY MAJOR MD    US Pelvic Complete with Transvaginal    Narrative    EXAM: Pelvic Ultrasound 3/5/2018 4:26 PM     COMPARISON: CT 3/4/2018 and 3/3/2018, MRI from 3/4/2018, and prior  ultrasound of 3/3/2018.    HISTORY:  Abnormal mass of possible ovarian etiology. Postmenopausal.    FINDINGS: Sonographic evaluation was technically challenging and the  patient deferred transvaginal examination.    The uterus was not well visualized transabdominally. The previously  seen complex solid/cystic right pelvic mass is redemonstrated with  internal vascularity demonstrated. The mass measures approximately 12  x 8 x 11 cm. Neither ovary is definitively identified.      Impression    IMPRESSION:   Technically limited transabdominal only sonographic examination of the  pelvis. Redemonstration of complex right pelvic mass with internal  vascularity, again, highly concerning for malignancy. Neither ovary  was identified and therefore ovarian relationship with the mass cannot  be established.    I have personally reviewed the examination and initial interpretation  and I agree with the findings.    JESUS MITCHELL MD   IR Abdominal Retroperitoneal Biopsy    Narrative    Anca Alanis  MRN: [id number]    PRE-OPERATIVE DIAGNOSIS:  Pelvic mass    POST-OPERATIVE DIAGNOSIS:  Same      PROCEDURE:  ultrasound guided biopsy and aspiration of RIGHT lower  abdomen/pelvic mass      Impression    IMPRESSION:  Completed ultrasound guided biopsy and aspiration of  RIGHT lower abdomen/pelvic mass. Concern for mucinous cancer discussed  with surgical team and report is that team/family wished to proceed  for tissue diagnosis. An area that appeared extra-peritoneal on CT/US  was targeted for sampling. Needle was placed into collection and core  biopsy was attempted, yielding uncertain solid fragments, four  attempts at core biopsy were made. Through the introducer cannula  aspiration was performed with return of opaque, purulent appearing,  foul-smelling  "viscous fluid. Dx: Pelvic mass. Dariusz. LOCAL    ----------    CLINICAL HISTORY:  Pelvic mass    PERFORMED BY:  ANAM Kitchne PA-C (Interventional  Radiology)    CONSENT:  The patient and spouse understood the limitations,  alternatives, and risks of the procedure and agreed to the procedure.   Written informed consent was obtained and is documented in the patient  record.    MEDICATIONS:  1% lidocaine was used for local anesthesia.    NURSING:  The patient was placed on continuous vital signs monitoring.   Vital signs were monitored by nursing staff under my supervision.    DESCRIPTION:  The patient's low abdomen was prepped and draped in the  usual sterile fashion.  An area that appeared extra-peritoneal on  CT/US was targeted for sampling. Needle was placed into collection and  core biopsy was attempted, yielding uncertain solid fragments, four  attempts at core biopsy were made. Through the introducer cannula  aspiration was performed with return of opaque, purulent appearing,  foul-smelling viscous fluid. Needle was removed.  Pressure was held  the site, cleansed, and sterile dressing applied.  Images were saved  throughout the procedure.    COMPLICATIONS:  No immediate complication; the patient remained stable  throughout the procedure.    ESTIMATED BLOOD LOSS:  Minimal    SPECIMENS:  Per above    -----  \"The physician assistant (PA) who performed this procedure and signed  the above report is licensed to practice in the state of [place]  pursuant to [place] Statute 147A.09.  This includes meeting the Statute and  [place] Board of Medical Practice requirement of an active  Delegation Agreement, which documents delegation of services by  primary and alternate supervising physicians.\"   -----    JANUSZ MITCHELL PA-C              Medications Prior to Admission:     Prescriptions Prior to Admission   Medication Sig Dispense Refill Last Dose     memantine (NAMENDA) 5 MG tablet Take 1 tablet (5 mg) " by mouth 2 times daily 180 tablet 3 Past Week at Unknown time     donepezil (ARICEPT) 5 MG tablet Take 1 tablet (5 mg) by mouth At Bedtime 90 tablet 3 Past Week at Unknown time     Calcium Carbonate-Vitamin D (CALCIUM + D PO) 315 mg-200 IU tablet. Take 0.5 tablet by mouth daily with meals.   3/2/2018 at Unknown time     Multiple Vitamin (MULTIVITAMIN OR) Take 0.5 tablets by mouth daily    3/2/2018 at Unknown time     alendronate (FOSAMAX) 70 MG tablet Take 1 tablet (70 mg) by mouth every 7 days (Patient taking differently: Take 70 mg by mouth every 7 days On Thursdays.) 12 tablet 3 3/1/2018 at Unknown time              Discharge Medications:     Current Discharge Medication List      START taking these medications    Details   melatonin 3 MG tablet Take 1 tablet (3 mg) by mouth At Bedtime    Associated Diagnoses: Abdominal mass, unspecified abdominal location      acetaminophen (TYLENOL) 325 MG tablet Take 2 tablets (650 mg) by mouth At Bedtime  Qty: 100 tablet    Associated Diagnoses: Abdominal mass, RLQ (right lower quadrant)      bisacodyl (DULCOLAX) 10 MG Suppository Place 1 suppository (10 mg) rectally daily as needed for constipation  Qty: 30 suppository    Associated Diagnoses: Abdominal mass, RLQ (right lower quadrant)      polyethylene glycol (MIRALAX/GLYCOLAX) Packet Take 17 g by mouth daily as needed for constipation  Qty: 7 packet    Associated Diagnoses: Abdominal mass, RLQ (right lower quadrant)      amoxicillin-clavulanate (AUGMENTIN) 500-125 MG per tablet Take 1 tablet by mouth every 12 hours for 7 days  Qty: 14 tablet, Refills: 0    Associated Diagnoses: Abdominal mass, RLQ (right lower quadrant)         CONTINUE these medications which have NOT CHANGED    Details   memantine (NAMENDA) 5 MG tablet Take 1 tablet (5 mg) by mouth 2 times daily  Qty: 180 tablet, Refills: 3    Associated Diagnoses: Late onset Alzheimer's disease without behavioral disturbance      donepezil (ARICEPT) 5 MG tablet Take 1  tablet (5 mg) by mouth At Bedtime  Qty: 90 tablet, Refills: 3    Associated Diagnoses: Late onset Alzheimer's disease without behavioral disturbance      Calcium Carbonate-Vitamin D (CALCIUM + D PO) 315 mg-200 IU tablet. Take 0.5 tablet by mouth daily with meals.      Multiple Vitamin (MULTIVITAMIN OR) Take 0.5 tablets by mouth daily       alendronate (FOSAMAX) 70 MG tablet Take 1 tablet (70 mg) by mouth every 7 days  Qty: 12 tablet, Refills: 3    Associated Diagnoses: Osteoporosis             Pt was seen and discussed with Dr. Renee on March 8, 2018       Angella Louis MD  General Surgery, PGY-1,   Pager: [phone]    Johnathon Yost DO, MA  Family Medicine PGY-1

## 2018-03-09 ENCOUNTER — TELEPHONE (OUTPATIENT)
Dept: FAMILY MEDICINE | Facility: CLINIC | Age: 83
End: 2018-03-09

## 2018-03-09 ENCOUNTER — CARE COORDINATION (OUTPATIENT)
Dept: SURGERY | Facility: CLINIC | Age: 83
End: 2018-03-09

## 2018-03-09 ENCOUNTER — CARE COORDINATION (OUTPATIENT)
Dept: CARE COORDINATION | Facility: CLINIC | Age: 83
End: 2018-03-09

## 2018-03-09 LAB
COPATH REPORT: NORMAL
COPATH REPORT: NORMAL

## 2018-03-09 NOTE — PROGRESS NOTES
RN Post-Op/Post-Discharge Care Coordination Note    Ms. Anca Alanis is a 89 year old female who was admitted for an abdominal mass from 3/3-3/8 with  Dr. Rafat Renee.  Spoke with caregiver.    Support  [facility] [phone]     Health Status  Fevers/chills: Patient denies any fever or chills.  Nausea/Vomiting: Patient reports a lack of appetite.  Eating/drinking: she is tolerating a small amount of food and fluid.  Bowel habits: her bowels have returned to normal.  Urinary: Voiding normally  Drains (ELIN): N/A  Incisions: na  Pain: RN states she has not complained of pain.  New Medications:  Melatonin, Tylenol, Dulcolax, Miralax, Augmentin (BID for 7 days)    Activity/Restrictions  Return to normal activites as tolerated. PT/OT has been started at TCU.    Equipment  None    Pathology reviewed with patient:  No: pending    All of her questions were answered including reviewing restrictions, pathology, and wound care.  She will call this office if she has any further questions and/or concerns.      Patient has tentatively been scheduled to see Dr. Renee in clinic 3/23 at 12:30. The facility needs to speak with patients  to see if the appointment will work.    Whom and When to Call  Patient acknowledges understanding of how to manage any medication changes and   when to seek medical care.     Patient advised that if after hour medical concerns arise to please call [phone] and choose option 4 to speak to the physician on call.     A copy of this note was routed to the primary surgeon.

## 2018-03-09 NOTE — TELEPHONE ENCOUNTER
[facility] phone call message - order or referral request for patient:     Order or referral being requested: OT       Additional Comments: Bri from [facility] is requesting a verbal order for OT for eval. And treat.     OK to leave a message on voice mail? Yes    Primary language: English      needed? No    Call taken on March 9, 2018 at 1:10 PM by Nona Howard

## 2018-03-09 NOTE — TELEPHONE ENCOUNTER
Returned call to home care OT to give verbal orders per protocol as requested. OT verbalized understanding.    Wilda Ambrosio RN

## 2018-03-09 NOTE — PLAN OF CARE
Problem: Patient Care Overview  Goal: Plan of Care/Patient Progress Review  Occupational Therapy Discharge Summary    Reason for therapy discharge:    Discharged to transitional care facility.    Progress towards therapy goal(s). See goals on Care Plan in [facility] electronic health record for goal details.  Goals partially met.  Barriers to achieving goals:   discharge from facility.    Therapy recommendation(s):    Continued therapy is recommended.  Rationale/Recommendations:  continued OT at TCU to increase pt's (I) with functional transfers and ADLS.

## 2018-03-10 LAB
BACTERIA SPEC CULT: ABNORMAL
BACTERIA SPEC CULT: ABNORMAL
SPECIMEN SOURCE: ABNORMAL

## 2018-03-10 NOTE — PLAN OF CARE
Problem: Patient Care Overview  Goal: Plan of Care/Patient Progress Review  Physical Therapy Discharge Summary    Reason for therapy discharge:    Discharged to transitional care facility.    Progress towards therapy goal(s). See goals on Care Plan in [facility] electronic health record for goal details.  Goals not met.  Barriers to achieving goals:   discharge from facility.    Therapy recommendation(s):    Continued therapy is recommended.  Rationale/Recommendations:  progress functional I.

## 2018-03-14 LAB
BACTERIA SPEC CULT: ABNORMAL
BACTERIA SPEC CULT: ABNORMAL
Lab: ABNORMAL
SPECIMEN SOURCE: ABNORMAL

## 2018-03-19 ENCOUNTER — TELEPHONE (OUTPATIENT)
Dept: FAMILY MEDICINE | Facility: CLINIC | Age: 83
End: 2018-03-19

## 2018-03-19 NOTE — TELEPHONE ENCOUNTER
"Per PCP \"Order:  Discharge to home.  Please communicat\"    RN returned call to corina harman to relay order to discharge home    Wilda Ambrosio RN    "

## 2018-03-19 NOTE — TELEPHONE ENCOUNTER
"[facility] phone call message - order or referral request for patient:     Order or referral being requested: telephone order to discharge patient to home. Patient is set to be discharged tomorrow 3/20/18. Patient is NOT needing any home services. Per Tali \"patient is scheduled to see  on 3/21/18\".    Additional Comments: Tali will also be faxing an order over to [facility].    OK to leave a message on voice mail? Yes    Primary language: English      needed? No    Call taken on March 19, 2018 at 3:14 PM by Anne-Marie Thomas    "

## 2018-03-21 ENCOUNTER — OFFICE VISIT (OUTPATIENT)
Dept: FAMILY MEDICINE | Facility: CLINIC | Age: 83
End: 2018-03-21
Payer: MEDICARE

## 2018-03-21 ENCOUNTER — OFFICE VISIT (OUTPATIENT)
Dept: PHARMACY | Facility: CLINIC | Age: 83
End: 2018-03-21
Payer: MEDICARE

## 2018-03-21 VITALS
SYSTOLIC BLOOD PRESSURE: 128 MMHG | TEMPERATURE: 97.9 F | OXYGEN SATURATION: 99 % | DIASTOLIC BLOOD PRESSURE: 77 MMHG | WEIGHT: 101.8 LBS | BODY MASS INDEX: 17.47 KG/M2 | RESPIRATION RATE: 16 BRPM | HEART RATE: 84 BPM

## 2018-03-21 DIAGNOSIS — R63.4 LOSS OF WEIGHT: ICD-10-CM

## 2018-03-21 DIAGNOSIS — F02.80 LATE ONSET ALZHEIMER'S DISEASE WITHOUT BEHAVIORAL DISTURBANCE (H): Primary | ICD-10-CM

## 2018-03-21 DIAGNOSIS — G30.1 LATE ONSET ALZHEIMER'S DISEASE WITHOUT BEHAVIORAL DISTURBANCE (H): Primary | ICD-10-CM

## 2018-03-21 DIAGNOSIS — F02.80 LATE ONSET ALZHEIMER'S DISEASE WITHOUT BEHAVIORAL DISTURBANCE (H): ICD-10-CM

## 2018-03-21 DIAGNOSIS — R19.00 ABDOMINAL MASS, UNSPECIFIED ABDOMINAL LOCATION: Primary | ICD-10-CM

## 2018-03-21 DIAGNOSIS — G30.1 LATE ONSET ALZHEIMER'S DISEASE WITHOUT BEHAVIORAL DISTURBANCE (H): ICD-10-CM

## 2018-03-21 DIAGNOSIS — G47.9 SLEEP DISORDER: ICD-10-CM

## 2018-03-21 RX ORDER — ZOLPIDEM TARTRATE 5 MG/1
5 TABLET ORAL
COMMUNITY
Start: 2011-08-01 | End: 2020-10-21

## 2018-03-21 RX ORDER — LANOLIN ALCOHOL/MO/W.PET/CERES
3 CREAM (GRAM) TOPICAL AT BEDTIME
Qty: 90 TABLET | Refills: 3 | Status: SHIPPED | OUTPATIENT
Start: 2018-03-21

## 2018-03-21 RX ORDER — MEMANTINE HYDROCHLORIDE 5 MG/1
5 TABLET ORAL 2 TIMES DAILY
Qty: 180 TABLET | Refills: 1 | Status: SHIPPED | OUTPATIENT
Start: 2018-03-21 | End: 2019-04-04

## 2018-03-21 RX ORDER — DONEPEZIL HYDROCHLORIDE 5 MG/1
5 TABLET, FILM COATED ORAL AT BEDTIME
Qty: 90 TABLET | Refills: 1 | Status: SHIPPED | OUTPATIENT
Start: 2018-03-21 | End: 2019-04-24

## 2018-03-21 NOTE — PATIENT INSTRUCTIONS
Pharmacy Instructions:    Go to [facility] to  melatonin medication.  Take 1 tablet by mouth once daily in the evening.  You do not need the medications for constipation.    If you would like help setting up medications, please make an appointment with Pharmacist (Aysha or Sheng) at [facility].  Call the clinic [phone] and ask to see PharmD for medications.  Aysha Anderson, KanD    EVERY CALORIE IS A GOOD CALORIE.    Use support stockings as needed for swelling.

## 2018-03-21 NOTE — MR AVS SNAPSHOT
After Visit Summary   3/21/2018    Anca Alanis    MRN: [id number]           Patient Information     Date Of Birth          1924        Visit Information        Provider Department      3/21/2018 1:20 PM Christin Anderson, [facility]        Today's Diagnoses     Late onset Alzheimer's disease without behavioral disturbance    -  1       Follow-ups after your visit        Your next 10 appointments already scheduled     2018  1:20 PM CDT   Return Visit with Rafat Richards MD   [facility] ([facility])    [address],  [address]  [place] [address]   [phone]              Who to contact     Please call your clinic at [phone] to:    Ask questions about your health    Make or cancel appointments    Discuss your medicines    Learn about your test results    Speak to your doctor            Additional Information About Your Visit        MyChart Information     [employer] is an electronic gateway that provides easy, online access to your medical records. With [employer], you can request a clinic appointment, read your test results, renew a prescription or communicate with your care team.     To sign up for [employer] visit the website at [email or web address]   You will be asked to enter the access code listed below, as well as some personal information. Please follow the directions to create your username and password.     Your access code is: [email or web address]  Expires: 2018  1:56 PM     Your access code will  in 90 days. If you need help or a new code, please contact your [facility] or call [phone] for assistance.        Care EveryWhere ID     This is your Care EveryWhere ID. This could be used by other organizations to access your [place] medical records  [id number]        Your Vitals Were     Pulse Temperature Respirations Pulse Oximetry BMI (Body Mass Index)       84 97.9  F  (36.6  C) (Oral) 16 99% 17.47 kg/m2        Blood Pressure from Last 3 Encounters:   03/23/18 131/60   03/21/18 128/77   03/08/18 148/62    Weight from Last 3 Encounters:   03/23/18 99 lb 1.6 oz (45 kg)   03/21/18 101 lb 12.8 oz (46.2 kg)   03/07/18 100 lb (45.4 kg)              Today, you had the following     No orders found for display         Today's Medication Changes          These changes are accurate as of 3/21/18 11:59 PM.  If you have any questions, ask your nurse or doctor.               These medicines have changed or have updated prescriptions.        Dose/Directions    alendronate 70 MG tablet   Commonly known as:  FOSAMAX   This may have changed:  additional instructions   Used for:  Osteoporosis        Dose:  70 mg   Take 1 tablet (70 mg) by mouth every 7 days   Quantity:  12 tablet   Refills:  3       donepezil 5 MG tablet   Commonly known as:  ARIcept   This may have changed:  additional instructions   Used for:  Late onset Alzheimer's disease without behavioral disturbance   Changed by:  Rafat Richards MD        Dose:  5 mg   Take 1 tablet (5 mg) by mouth At Bedtime for memory.   Quantity:  90 tablet   Refills:  1       memantine 5 MG tablet   Commonly known as:  NAMENDA   This may have changed:  additional instructions   Used for:  Late onset Alzheimer's disease without behavioral disturbance   Changed by:  Rafat Richards MD        Dose:  5 mg   Take 1 tablet (5 mg) by mouth 2 times daily for memory.   Quantity:  180 tablet   Refills:  1            Where to get your medicines      These medications were sent to [id number] [address] [address]  [address] [facility] [address]     Phone:  [phone]     donepezil 5 MG tablet    melatonin 3 MG tablet    memantine 5 MG tablet                Primary Care Provider Office Phone # Fax #    Rafat Richards [phone]       [address] [address]        Equal Access to  Services     [facility]: Hadii mark anthony preciado hadjcarlosev Soclifford, waaxda luqadaha, qaybta kaalmada shellylauradarin, kiana leevilmanatalie burgos . So [facility] [phone].    ATENCIÓN: Si adriana chery, tiene a gillette disposición servicios gratuitos de asistencia lingüística. Llame al [phone].    We comply with applicable federal civil rights laws and [place] laws. We do not discriminate on the basis of race, color, national origin, age, disability, sex, sexual orientation, or gender identity.            Thank you!     Thank you for choosing [facility]  for your care. Our goal is always to provide you with excellent care. Hearing back from our patients is one way we can continue to improve our services. Please take a few minutes to complete the written survey that you may receive in the mail after your visit with us. Thank you!             Your Updated Medication List - Protect others around you: Learn how to safely use, store and throw away your medicines at www.disposemymeds.org.          This list is accurate as of 3/21/18 11:59 PM.  Always use your most recent med list.                   Brand Name Dispense Instructions for use Diagnosis    acetaminophen 325 MG tablet    TYLENOL    100 tablet    Take 2 tablets (650 mg) by mouth At Bedtime    Abdominal mass, RLQ (right lower quadrant)       alendronate 70 MG tablet    FOSAMAX    12 tablet    Take 1 tablet (70 mg) by mouth every 7 days    Osteoporosis       CALCIUM + D PO      315 mg-200 IU tablet. Take 0.5 tablet by mouth daily with meals.        donepezil 5 MG tablet    ARIcept    90 tablet    Take 1 tablet (5 mg) by mouth At Bedtime for memory.    Late onset Alzheimer's disease without behavioral disturbance       melatonin 3 MG tablet     90 tablet    Take 1 tablet (3 mg) by mouth At Bedtime    Sleep disorder       memantine 5 MG tablet    NAMENDA    180 tablet    Take 1 tablet (5 mg) by mouth 2 times daily for memory.    Late onset  Alzheimer's disease without behavioral disturbance       MULTIVITAMIN PO      Take 0.5 tablets by mouth daily        zolpidem 5 MG tablet    AMBIEN     Take 5 mg by mouth

## 2018-03-21 NOTE — MR AVS SNAPSHOT
After Visit Summary   3/21/2018    Anca Alanis    MRN: [id number]           Patient Information     Date Of Birth          5/2/1924        Visit Information        Provider Department      3/21/2018 1:40 PM Rafat Richards MD [facility]        Today's Diagnoses     Abdominal mass, unspecified abdominal location    -  1    Loss of weight          Care Instructions    Pharmacy Instructions:    Go to [facility] to  melatonin medication.  Take 1 tablet by mouth once daily in the evening.  You do not need the medications for constipation.    If you would like help setting up medications, please make an appointment with Pharmacist (Aysha or Sheng) at [facility].  Call the clinic [phone] and ask to see PharmD for medications.  Aysha Anderson, Ivy    EVERY CALORIE IS A GOOD CALORIE.    Use support stockings as needed for swelling.              Follow-ups after your visit        Follow-up notes from your care team     Return in about 4 weeks (around 4/18/2018).      Your next 10 appointments already scheduled     Mar 23, 2018 12:30 PM CDT   (Arrive by 12:15 PM)   Return Visit with Rafat Renee MD   [facility] ([facility])    [address] [place] [address] [phone]              Who to contact     Please call your clinic at [phone] to:    Ask questions about your health    Make or cancel appointments    Discuss your medicines    Learn about your test results    Speak to your doctor            Additional Information About Your Visit        MyChart Information     [employer] is an electronic gateway that provides easy, online access to your medical records. With [employer], you can request a clinic appointment, read your test results, renew a prescription or communicate with your care team.     To sign up for [employer] visit the website at [email or web address]   You will be asked to enter  the access code listed below, as well as some personal information. Please follow the directions to create your username and password.     Your access code is: [email or web address]  Expires: 2018  1:56 PM     Your access code will  in 90 days. If you need help or a new code, please contact your [facility] or call [phone] for assistance.        Care EveryWhere ID     This is your Care EveryWhere ID. This could be used by other organizations to access your [place] medical records  [id number]         Blood Pressure from Last 3 Encounters:   18 128/77   18 148/62   10/25/17 133/58    Weight from Last 3 Encounters:   18 101 lb 12.8 oz (46.2 kg)   18 100 lb (45.4 kg)   10/25/17 103 lb (46.7 kg)              Today, you had the following     No orders found for display         Today's Medication Changes          These changes are accurate as of 3/21/18  2:05 PM.  If you have any questions, ask your nurse or doctor.               These medicines have changed or have updated prescriptions.        Dose/Directions    alendronate 70 MG tablet   Commonly known as:  FOSAMAX   This may have changed:  additional instructions   Used for:  Osteoporosis        Dose:  70 mg   Take 1 tablet (70 mg) by mouth every 7 days   Quantity:  12 tablet   Refills:  3                Primary Care Provider Office Phone # Fax #    Rafat Richards [phone]       [address] [address]        Equal Access to Services     [facility]: Hadii mark anthony kenneyo Soclifford, waaxda luqadaha, qaybta kaalmada kiana richardson . So [facility] [phone].    ATENCIÓN: Si habla español, tiene a gillette disposición servicios gratuitos de asistencia lingüística. Llame al [phone].    We comply with applicable federal civil rights laws and [place] laws. We do not discriminate on the basis of race, color, national origin, age, disability,  sex, sexual orientation, or gender identity.            Thank you!     Thank you for choosing [facility]  for your care. Our goal is always to provide you with excellent care. Hearing back from our patients is one way we can continue to improve our services. Please take a few minutes to complete the written survey that you may receive in the mail after your visit with us. Thank you!             Your Updated Medication List - Protect others around you: Learn how to safely use, store and throw away your medicines at www.disposemymeds.org.          This list is accurate as of 3/21/18  2:05 PM.  Always use your most recent med list.                   Brand Name Dispense Instructions for use Diagnosis    acetaminophen 325 MG tablet    TYLENOL    100 tablet    Take 2 tablets (650 mg) by mouth At Bedtime    Abdominal mass, RLQ (right lower quadrant)       alendronate 70 MG tablet    FOSAMAX    12 tablet    Take 1 tablet (70 mg) by mouth every 7 days    Osteoporosis       bisacodyl 10 MG Suppository    DULCOLAX    30 suppository    Place 1 suppository (10 mg) rectally daily as needed for constipation    Abdominal mass, RLQ (right lower quadrant)       CALCIUM + D PO      315 mg-200 IU tablet. Take 0.5 tablet by mouth daily with meals.        donepezil 5 MG tablet    ARIcept    90 tablet    Take 1 tablet (5 mg) by mouth At Bedtime    Late onset Alzheimer's disease without behavioral disturbance       melatonin 3 MG tablet      Take 1 tablet (3 mg) by mouth At Bedtime    Abdominal mass, unspecified abdominal location       memantine 5 MG tablet    NAMENDA    180 tablet    Take 1 tablet (5 mg) by mouth 2 times daily    Late onset Alzheimer's disease without behavioral disturbance       MULTIVITAMIN PO      Take 0.5 tablets by mouth daily        polyethylene glycol Packet    MIRALAX/GLYCOLAX    7 packet    Take 17 g by mouth daily as needed for constipation    Abdominal mass, RLQ (right lower quadrant)        zolpidem 5 MG tablet    AMBIEN     Take 5 mg by mouth

## 2018-03-21 NOTE — PROGRESS NOTES
Pharmacy Progress Note: Transitions of Care     History of Hospitalization     Anca Alanis was referred by Rafat Barbosa for transitional care and medication management following their recent hospitalization.       HISTORY OF Hospitalization    Reason for hospitalization: Abdominal Mass/ Abdominal Pain   Date of ADMIT: 3/3/18   Date of DISCHARGE 3/8/18   Other hospitalizations in past year: None known     Hospital problem list/ specific issues to address: see below     Medication CHANGES made during hospitalization (from Discharge Summary)     Discontinued: Added (initiated): Changed (dose/frequency):   Medication(s)       Melatonin 3mg tablet: 1 tablet by mouth at bedtime    Acetaminophen 325 m tablets by mouth at bedtime.    Bisacodyl 10 mg suppository: place 1 suppository rectally daily as needed for constipation.    Augmentin 500-125 mg: take 1 tablet by mouth every 12 hour for 7 days.         SUBJECTIVE     Patient is in clinic today for hospital follow-up after admission for RLQ pain, abdominal mass.  She was at a TCU and today is day 2 at home.  She brings her  Kris with to the visit.  He states that he has noticed her more frail than previously, but denies any falls.     1. Sleep    Neither Kris nor Anca are aware of the melatonin prescription.  They have not picked it up from the pharmacy or started it.    Anca says that sometimes she has trouble falling asleep so she will take ambien, despite Dr. Richards recommending her not to take it in the past.  She says that she has had this akins for years and that she keeps an ambien around in case she needs it, but tries not to use it.  She is unable to state when the last time was that she used it- because of her memory difficulties - although she seems to think it was rather recently.    2. Pain/Bowels    Ary has no complaints of diarrhea, constipation, abdominal pain.  She has not picked up the prescriptions for miralax or  bisacodyl.  She is unaware what medications she got in the TCU, but denies a suppository.    She denies any abdominal pain and has not taken any pain medications since being home.    3. Memory    Kris says that she has not gotten her memory medications since being home, but he assumes that she got them in the TCU.    4. Vitamins/Bone Health    Kris and Anca seem to be aware of the once weekly medication on thursdays for bone health.  They also state that she takes 1/2 a multivitamin tablet and 1/2 calcium +vitamin D tablet.    5. Adherence    Kris says that he and his wife set up the medications at home.  He has not started giving her her medications at home yet and says that the last doses given would have been at the TCU.      He says that he has refills of all the medications at home.     They deny any history of home nursing to help set up medications, but say that they would not be opposed to this assistance in the future (from either nurse of Pharmacy) - they think they are fine for now though.    6. Antibiotics    Anca does not know anything about an antibiotic.  Her  thinks that it was completed at the TCU.    Medication DISCREPANCIES (differences between medication list and what patient reports, or duplications requiring clarification)    Type of Discrepancy Expected:  Medication on list but patient not taking  Patient is taking but not on list Actual:  Patient is taking but not as directed  Duplication (clarify dose)    Not using miralax     Not using melatonin     Not using bisacodyl      Refills needed? no    Adherence: no medications taken since arriving home.       OBJECTIVE  Creatinine   Date Value Ref Range Status   03/03/2018 0.83 0.52 - 1.04 mg/dL Final     Liver Function Studies -   Recent Labs   Lab Test  03/05/18   1743  03/04/10   1959   PROTTOTAL   --   6.5*   ALBUMIN  1.8*  4.0   BILITOTAL   --   0.4   ALKPHOS   --   76   AST   --   34   ALT   --   17     Immunization History    Administered Date(s) Administered     Influenza (High Dose) 3 valent vaccine 10/07/2015     Influenza (IIV3) PF 2004, 10/01/2008, 10/14/2009, 10/06/2010, 10/05/2011, 10/09/2013     Pneumo Conj 13-V (2010&after) 2015     Pneumococcal 23 valent 2004     TD (ADULT, 7+) 2009     PharmD called transitions of care unit: [facility] [phone] to inquire about medication administration during her stay.  Nurse Hilario confirms that Anca received augmentin BID for 7 days from 3/8/18 to 3/15/18.  No constipation medications were administered.  Scheduled acetaminophen 650 mg: once daily in the evening at bedtime.  Melatonin 3 mg: once daily in the evening.  Calcium-Vitamin D 315 - 225 m/2 tablet by mouth once daily.  Multivitamin: 1/2 tablet by mouth   Donepezil and memantine were given as scheduled.    ASSESSMENT     Drug Therapy Problems  Patient s recent hospitalization was likely/not likely related to medication use.    1) Sleep;          Status:  uncontrolled       DTP: safety- alternative agent available - safer therapy melatonin  , DTP degree:2            Patient, 89 years of age, should not be on ambien as it is on th BEERS list and patient is at fall risk.  Recommended change to melatonin - discussed with physician as well and patient and  were agreeable after discussion with Dr. Richards.     2) Pain/bowels;         Status:  controlled       DTP:  None    Per subjective report, pain and bowels are managed without medication therapy.  Discontinuation of acetaminophen and bowel agents is appropriate.    3) Memory;          Status:  Controlled - see below.       DTP:  None     and patient seem to understand medication indication - appropriate to continue.  Although medications have not been restarted,  see adherence below for further discussion.    4) Vitamins/Bone Health;          Status:  controlled       DTP:  None    Patient and  understand dosing  instructions, no DTP.  Appropriate to continue.    5) Adherence;         Status:  uncontrolled       DTP:  patient forgets to take  , DTP degree:2            Unclear as to why medications have not been restarted since being home, especially since  states that he dose have all medications in the home.  Importance of medication adherence was stressed during appointment today and offered PharmD service to help teach pill box set-up to .  He seems mildly interested, but noncommittal at this time.    6) Antibiotics       Status:  controlled       DTP:  None    Per TCU report, antibiotic course was completed.  No further indication known for antibiotics at this time with no pain of further complaints.      Health literacy           General: Low  Comment: Both Anca and  have low health literacy.    PLAN    PharmD called patient's pharmacy ([facility]) to prepare medications for pick-up [phone]).  No refills on donepezil 90 DS on 2/9.  Memantine - last filled 12/21/17 for 90 DS, 1 refill (qty 90) remaining.  Fosamax last fill January 2018, 1 refill remaining.  No prescription for melatonin on file.    Plan:    1. Sleep  Stop taking zolpidem, as previously recommended by Rafat Barbosa  Go to [facility] to  melatonin medication.  Take 1 tablet by mouth once daily in the evening.  You do not need the medications for constipation.    2. Pain/bowels - no medication indicated.  Okay for patient to take OTC acetaminophen if needed.    3. Memory  Continue taking donepezil once daily at bedtime and memantine twice daily.    4. Vitamins/Bone health: continue taking 1/2 tablet multivitamin daily and Calcium+Vitamin D: 1/2 tablet by mouth daily.  Continue taking alendronate 70 mh tablet: 1 tablet by mouth on thursdays.    5. Adherence: If you would like help setting up medications, please make an appointment with Pharmacist (Aysha or Sheng) at [facility].  Call the clinic [phone] and ask to  see PharmD for medications.    Follow-up with Dr. Renee on 3/23 at 12:30 pm at the [facility] [place].  Follow-up: with PCP as directed    Patient obtains medications from [facility].     MTM and medication reconciliation have been completed by pharmacy. Medication assessment and plan address patient s specific concerns and goals.     Rafat Barbosa was provided our recommendations in clinic today  before/after they saw patient and Rafat Barbosa was available for supervision during the visit and is the authorizing prescriber for this visit through the pharmacist collaborative practice agreement.     Thank you for the opportunity to participate in the care of this patient.  Christin Anderson, Pharm.D.  Appointment length: 20 minutes

## 2018-03-21 NOTE — PROGRESS NOTES
Hospitalization Follow-up Visit         [facility]       Hospital Follow-up Visit:    Hospital:  [facility]   Date of Admission: 3.3.18  Date of Discharge: 3.8.28  Reason(s) for Admission: Weight loss, abdominal mass            Problems taking medications regularly:  None       Post Discharge Medication Reconciliation: discharge medications reconciled, continue medications without change.       Problems adhering to non-medication therapy:  None       Medications reviewed by: by PharmD    Summary of hospitalization:  [facility] discharge summary reviewed  Diagnostic Tests/Treatments reviewed.  Follow up needed: 3/23 in Gen Surgery  Other Healthcare Providers Involved in Patient s Care:         Dr. Rafat Renee  Update since discharge: improved.   Plan of care communicated with patient and family                        Review of Systems:   CONSTITUTIONAL: + fatigue, no unexpected change in weight  SKIN: no worrisome rashes, no worrisome moles, no worrisome lesions  EYES: no acute vision problems or changes  ENT: no ear problems, no mouth problems, no throat problems  RESP: no significant cough, no shortness of breath  CV: no chest pain, no palpitations, no new or worsening peripheral edema  GI: no nausea, no vomiting, no constipation, no diarrhea            Physical Exam:     There were no vitals filed for this visit.  There is no height or weight on file to calculate BMI.    GENERAL: no distress  EYES: Eyes grossly normal to inspection  HENT: TMs- normal; Nose- normal; Mouth- no ulcers, no lesions  NECK: no tenderness, no adenopathy, no asymmetry, no masses, no stiffness; thyroid- normal to palpation  RESP: lungs clear to auscultation - no rales, no rhonchi, no wheezes  CV: regular rates and rhythm, normal S1 S2, no S3 or S4 and no murmur, no click or rub -  ABDOMEN: soft, no tenderness, no  Hepatosplenomegaly, + 8x11 cm very firm nontender mass RLQ abutting pelvic rim.  Cool and  nonerythematous  MS: extremities- no gross deformities noted, no edema  SKIN: no suspicious lesions, no rashes  NEURO: strength and tone- normal  BACK: no CVA tenderness, no paralumbar tenderness  PSYCH: Alert; speech- coherent , + memory deficits  LYMPHATICS: ant. cervical- normal, post. cervical- normal, axillary- normal, supraclavicular- normal, inguinal- normal         Results:   Results from the last 24 hours No results found for this or any previous visit (from the past 24 hour(s)).    Assessment and Plan      Diagnoses and all orders for this visit:    Abdominal mass, unspecified abdominal location - diff dx remains abscess (resolving) vs. Malignancy.  Certainly the inflammation has abated and she is asymptomatic and gaining weight.  F/U in surgery in 2 days.      Loss of weight - improving    Sleep disorder - melatonin would be OK to try.  -     melatonin 3 MG tablet; Take 1 tablet (3 mg) by mouth At Bedtime    Late onset Alzheimer's disease without behavioral disturbance - back at home with .  All functional needs met.  -     donepezil (ARICEPT) 5 MG tablet; Take 1 tablet (5 mg) by mouth At Bedtime for memory.  -     memantine (NAMENDA) 5 MG tablet; Take 1 tablet (5 mg) by mouth 2 times daily for memory.            E&M code to be billed if TCM cannot be: 57986    Type of decision making: Moderate complexity (11597)      Options for treatment and follow-up care were reviewed with the patient  Anca Alanis   engaged in the decision making process and verbalized understanding of the options discussed and agreed with the final plan.      Rafat Richards MD

## 2018-03-22 ENCOUNTER — DOCUMENTATION ONLY (OUTPATIENT)
Dept: FAMILY MEDICINE | Facility: CLINIC | Age: 83
End: 2018-03-22

## 2018-03-22 ENCOUNTER — TELEPHONE (OUTPATIENT)
Dept: SURGERY | Facility: CLINIC | Age: 83
End: 2018-03-22

## 2018-03-22 NOTE — TELEPHONE ENCOUNTER
Established Patient Telephone Reminder Call    Date of call:  03/22/18  Phone numbers:  Home number on file [phone] (home)    Reached patient/confirmed appointment:  No -answering machine was off, unable to leave message.  Appointment with:   Dr. Rafat Renee  Reason for visit:  Hospital follow up

## 2018-03-22 NOTE — PROGRESS NOTES
"When opening a documentation only encounter, be sure to enter in \"Chief Complaint\" Forms and in \" Comments\" Title of form, description if needed.    Anca is a 89 year old  female  Form received via: Fax  Form now resides in: Provider Ready    Carson Mi CMA                Form has been completed by provider.     Form sent out via: Fax to [phone]  Patient informed: No, Reason:confirmed by fax confirmation  Output date: March 22, 2018    Carson Mi CMA      **Please close the encounter**      "

## 2018-03-23 ENCOUNTER — OFFICE VISIT (OUTPATIENT)
Dept: SURGERY | Facility: CLINIC | Age: 83
End: 2018-03-23
Payer: MEDICARE

## 2018-03-23 VITALS
HEART RATE: 81 BPM | DIASTOLIC BLOOD PRESSURE: 60 MMHG | SYSTOLIC BLOOD PRESSURE: 131 MMHG | BODY MASS INDEX: 16.92 KG/M2 | TEMPERATURE: 98.1 F | HEIGHT: 64 IN | OXYGEN SATURATION: 98 % | WEIGHT: 99.1 LBS

## 2018-03-23 DIAGNOSIS — R19.03 RIGHT LOWER QUADRANT ABDOMINAL MASS: Primary | ICD-10-CM

## 2018-03-23 ASSESSMENT — PAIN SCALES - GENERAL: PAINLEVEL: NO PAIN (0)

## 2018-03-23 NOTE — PATIENT INSTRUCTIONS
Return to the Surgery Clinic on an as needed basis.  Please call [phone], option #3, with questions.

## 2018-03-23 NOTE — PROGRESS NOTES
===================    Pharmacy Attestation Statement:    Patient s case reviewed. I agree with the written assessment and plan of care.    Sheng Santamaria PharmD.    ===================

## 2018-03-23 NOTE — NURSING NOTE
"No chief complaint on file.      Vitals:    03/23/18 1231   BP: 131/60   BP Location: Left arm   Patient Position: Chair   Cuff Size: Adult Regular   Pulse: 81   Temp: 98.1  F (36.7  C)   SpO2: 98%   Weight: 99 lb 1.6 oz   Height: 5' 4\"       Body mass index is 17.01 kg/(m^2).      Linda Gtz                          "

## 2018-03-23 NOTE — LETTER
3/23/2018       RE: Anca Alanis  [address]   [place] [address]     Dear Colleague,    Thank you for referring your patient, Anca Alanis, to the [facility] at [facility]. Please see a copy of my visit note below.    REASON FOR VISIT:  Anca Alanis, a 89-year-old female, presents following hospitalization which involved care for a painful large abdominal mass that was associated with the biopsy that demonstrated bacterial inflammation.  The patient has responded to antibiotic therapy.  The patient is currently eating a regular diet and having normal bowel movements.  She is nearly pain-free.     ROS: ten point ROS is negative    PAST MEDICAL HISTORY:  Past Medical History:   Diagnosis Date     Cataracts, both eyes      Glaucoma suspect         PAST SURGICAL HISTORY:  No past surgical history on file.     MEDICATIONS:  Current Outpatient Prescriptions   Medication     zolpidem (AMBIEN) 5 MG tablet     melatonin 3 MG tablet     donepezil (ARICEPT) 5 MG tablet     memantine (NAMENDA) 5 MG tablet     acetaminophen (TYLENOL) 325 MG tablet     alendronate (FOSAMAX) 70 MG tablet     Calcium Carbonate-Vitamin D (CALCIUM + D PO)     Multiple Vitamin (MULTIVITAMIN OR)     No current facility-administered medications for this visit.         ALLERGIES:  Allergies   Allergen Reactions     Ciprofloxacin      Sulfa Drugs         SOCIAL HISTORY:  Social History     Social History     Marital status:      Spouse name: N/A     Number of children: N/A     Years of education: N/A     Social History Main Topics     Smoking status: Never Smoker     Smokeless tobacco: Never Used     Alcohol use No     Drug use: No     Sexual activity: Not Asked     Other Topics Concern     None     Social History Narrative       FAMILY HISTORY:  Family History   Problem Relation Age of Onset     Hypertension Sister      DIABETES Paternal Grandmother      DIABETES  "Son         PHYSICAL EXAM:  Vital Signs: /60 (BP Location: Left arm, Patient Position: Chair, Cuff Size: Adult Regular)  Pulse 81  Temp 98.1  F (36.7  C)  Ht 1.626 m (5' 4\")  Wt 45 kg (99 lb 1.6 oz)  SpO2 98%  BMI 17.01 kg/m2  GENERAL:  The patient is awake and alert.  The patient is in N.A.D.   ABD: The abdomen is soft and nontender.  There is no mass apparent on palpation of the abdomen.  Patient has no tenderness, and no skin changes associated with what was the location of a previously a large palpable mass in the right mid abdomen.    EXT: Extremities are warm with some edema bilaterally.  The patient has significant edema of the ankles and lower legs as well as the feet with some discomfort on palpation.      IMPRESSION:  Anca Alanis, a 89-year-old female, has apparently undergone complete resolution of the abdominal mass which was probably inflammatory in nature as it demonstrated bacteria on the biopsy and responded to antibiotic therapy.  The patient currently has no blood per rectum, appears to be eating a regular diet and having normal bowel movements.  Denies blood per rectum.  The patient does need additional nutritional support.  Recommended the patient will return to calorically advantageous diet for the next several months to regain significant muscle mass.  The patient will call or return should she develop additional new problems and followup with her primary care physician, Dr. Rafat Richards.        Again, thank you for allowing me to participate in the care of your patient.      Sincerely,    Rafat Renee MD      "

## 2018-03-23 NOTE — PROGRESS NOTES
"REASON FOR VISIT:  Anca Alanis, a 89-year-old female, presents following hospitalization which involved care for a painful large abdominal mass that was associated with the biopsy that demonstrated bacterial inflammation.  The patient has responded to antibiotic therapy.  The patient is currently eating a regular diet and having normal bowel movements.  She is nearly pain-free.     ROS: ten point ROS is negative    PAST MEDICAL HISTORY:  Past Medical History:   Diagnosis Date     Cataracts, both eyes      Glaucoma suspect         PAST SURGICAL HISTORY:  No past surgical history on file.     MEDICATIONS:  Current Outpatient Prescriptions   Medication     zolpidem (AMBIEN) 5 MG tablet     melatonin 3 MG tablet     donepezil (ARICEPT) 5 MG tablet     memantine (NAMENDA) 5 MG tablet     acetaminophen (TYLENOL) 325 MG tablet     alendronate (FOSAMAX) 70 MG tablet     Calcium Carbonate-Vitamin D (CALCIUM + D PO)     Multiple Vitamin (MULTIVITAMIN OR)     No current facility-administered medications for this visit.         ALLERGIES:  Allergies   Allergen Reactions     Ciprofloxacin      Sulfa Drugs         SOCIAL HISTORY:  Social History     Social History     Marital status:      Spouse name: N/A     Number of children: N/A     Years of education: N/A     Social History Main Topics     Smoking status: Never Smoker     Smokeless tobacco: Never Used     Alcohol use No     Drug use: No     Sexual activity: Not Asked     Other Topics Concern     None     Social History Narrative       FAMILY HISTORY:  Family History   Problem Relation Age of Onset     Hypertension Sister      DIABETES Paternal Grandmother      DIABETES Son         PHYSICAL EXAM:  Vital Signs: /60 (BP Location: Left arm, Patient Position: Chair, Cuff Size: Adult Regular)  Pulse 81  Temp 98.1  F (36.7  C)  Ht 1.626 m (5' 4\")  Wt 45 kg (99 lb 1.6 oz)  SpO2 98%  BMI 17.01 kg/m2  GENERAL:  The patient is awake and alert.  The patient is in " N.A.D.   ABD: The abdomen is soft and nontender.  There is no mass apparent on palpation of the abdomen.  Patient has no tenderness, and no skin changes associated with what was the location of a previously a large palpable mass in the right mid abdomen.    EXT: Extremities are warm with some edema bilaterally.  The patient has significant edema of the ankles and lower legs as well as the feet with some discomfort on palpation.      IMPRESSION:  Anca Alanis, a 89-year-old female, has apparently undergone complete resolution of the abdominal mass which was probably inflammatory in nature as it demonstrated bacteria on the biopsy and responded to antibiotic therapy.  The patient currently has no blood per rectum, appears to be eating a regular diet and having normal bowel movements.  Denies blood per rectum.  The patient does need additional nutritional support.  Recommended the patient will return to calorically advantageous diet for the next several months to regain significant muscle mass.  The patient will call or return should she develop additional new problems and followup with her primary care physician, Dr. Rafat Richards.      Rafat Renee MD

## 2018-03-23 NOTE — MR AVS SNAPSHOT
After Visit Summary   3/23/2018    Anca Alanis    MRN: [id number]           Patient Information     Date Of Birth          1924        Visit Information        Provider Department      3/23/2018 12:30 PM Rafat Renee MD [facility]        Today's Diagnoses     Right lower quadrant abdominal mass    -  1      Care Instructions    Return to the Surgery Clinic on an as needed basis.  Please call [phone], option #3, with questions.            Follow-ups after your visit        Your next 10 appointments already scheduled     2018  1:20 PM CDT   Return Visit with Rafat Richards MD   [facility] ([facility])    [address],  [address]  [place] [address]   [phone]              Who to contact     Please call your clinic at [phone] to:    Ask questions about your health    Make or cancel appointments    Discuss your medicines    Learn about your test results    Speak to your doctor            Additional Information About Your Visit        MyChart Information     Glympse is an electronic gateway that provides easy, online access to your medical records. With Glympse, you can request a clinic appointment, read your test results, renew a prescription or communicate with your care team.     To sign up for [employer] visit the website at [email or web address]   You will be asked to enter the access code listed below, as well as some personal information. Please follow the directions to create your username and password.     Your access code is: [email or web address]  Expires: 2018  1:56 PM     Your access code will  in 90 days. If you need help or a new code, please contact your [facility] or call [phone] for assistance.        Care EveryWhere ID     This is your Care EveryWhere ID. This could be used by other organizations to access your [place] medical records  [id number]       "  Your Vitals Were     Pulse Temperature Height Pulse Oximetry BMI (Body Mass Index)       81 98.1  F (36.7  C) 1.626 m (5' 4\") 98% 17.01 kg/m2        Blood Pressure from Last 3 Encounters:   03/23/18 131/60   03/21/18 128/77   03/08/18 148/62    Weight from Last 3 Encounters:   03/23/18 45 kg (99 lb 1.6 oz)   03/21/18 46.2 kg (101 lb 12.8 oz)   03/07/18 45.4 kg (100 lb)              Today, you had the following     No orders found for display         Today's Medication Changes          These changes are accurate as of 3/23/18 11:59 PM.  If you have any questions, ask your nurse or doctor.               These medicines have changed or have updated prescriptions.        Dose/Directions    alendronate 70 MG tablet   Commonly known as:  FOSAMAX   This may have changed:  additional instructions   Used for:  Osteoporosis        Dose:  70 mg   Take 1 tablet (70 mg) by mouth every 7 days   Quantity:  12 tablet   Refills:  3                Primary Care Provider Office Phone # Fax #    Rafat Richards [phone]       [address] [place] [address]        Equal Access to Services     DEE DEE LINTON AH: Lisbeth Shields, clovis gerber, ariane richardson, kiana velázquez. So [facility] [phone].    ATENCIÓN: Si habla español, tiene a gillette disposición servicios gratuitos de asistencia lingüística. [facility] [phone].    We comply with applicable federal civil rights laws and [place] laws. We do not discriminate on the basis of race, color, national origin, age, disability, sex, sexual orientation, or gender identity.            Thank you!     Thank you for choosing [facility]  for your care. Our goal is always to provide you with excellent care. Hearing back from our patients is one way we can continue to improve our services. Please take a few minutes to complete the written survey that you may receive in the mail after your visit with " us. Thank you!             Your Updated Medication List - Protect others around you: Learn how to safely use, store and throw away your medicines at www.disposemymeds.org.          This list is accurate as of 3/23/18 11:59 PM.  Always use your most recent med list.                   Brand Name Dispense Instructions for use Diagnosis    acetaminophen 325 MG tablet    TYLENOL    100 tablet    Take 2 tablets (650 mg) by mouth At Bedtime    Abdominal mass, RLQ (right lower quadrant)       alendronate 70 MG tablet    FOSAMAX    12 tablet    Take 1 tablet (70 mg) by mouth every 7 days    Osteoporosis       CALCIUM + D PO      315 mg-200 IU tablet. Take 0.5 tablet by mouth daily with meals.        donepezil 5 MG tablet    ARIcept    90 tablet    Take 1 tablet (5 mg) by mouth At Bedtime for memory.    Late onset Alzheimer's disease without behavioral disturbance       melatonin 3 MG tablet     90 tablet    Take 1 tablet (3 mg) by mouth At Bedtime    Sleep disorder       memantine 5 MG tablet    NAMENDA    180 tablet    Take 1 tablet (5 mg) by mouth 2 times daily for memory.    Late onset Alzheimer's disease without behavioral disturbance       MULTIVITAMIN PO      Take 0.5 tablets by mouth daily        zolpidem 5 MG tablet    AMBIEN     Take 5 mg by mouth

## 2018-03-28 ENCOUNTER — TELEPHONE (OUTPATIENT)
Dept: FAMILY MEDICINE | Facility: CLINIC | Age: 83
End: 2018-03-28

## 2018-03-28 NOTE — TELEPHONE ENCOUNTER
RN returned call to  who states since patient has been home from SNF facility patient isn't eating much.  states she walks independently but shakey, he helps her dress and bath. States she doesn't have pain but is frail    Pt's  is wondering if she can go back to [facility] home because she was doing well    Message routed to PCP    Wilda Ambrosio RN

## 2018-03-28 NOTE — TELEPHONE ENCOUNTER
"[facility] phone call message- general phone call:    Reason for call: Patient's  states his wife was at [facility] and came home 2 days ago and she \"is not doing well, and would like Dr. Richards's permission to take her back\".    I did offer to schedule appointment for wife to be seen in clinic but  does not want to do that.    Return call needed: Yes    OK to leave a message on voice mail? Yes    Primary language: English      needed? No    Call taken on March 28, 2018 at 1:22 PM by Aida Sanchez  "

## 2018-03-28 NOTE — TELEPHONE ENCOUNTER
Patient's  calling to check status of request to have patient admitted to [facility]. Spoke with  who confirmed that everything is done on [facility]'s end and that patient will be contacted by nursing home. Advised patient's  of this information and he expressed understanding.

## 2018-04-02 ENCOUNTER — TRANSFERRED RECORDS (OUTPATIENT)
Dept: HEALTH INFORMATION MANAGEMENT | Facility: CLINIC | Age: 83
End: 2018-04-02

## 2018-04-12 ENCOUNTER — TELEPHONE (OUTPATIENT)
Dept: LAB | Facility: CLINIC | Age: 83
End: 2018-04-12

## 2018-04-12 ENCOUNTER — TELEPHONE (OUTPATIENT)
Dept: FAMILY MEDICINE | Facility: CLINIC | Age: 83
End: 2018-04-12

## 2018-04-12 NOTE — TELEPHONE ENCOUNTER
Per PCP request, RN called and gave VO for the following per paper orders sent to clinic from [facility]'s dietician's recommendations:    1. Discontinue Boost  2. Mighty shake three times daily with meals (dx: weight loss)  3. Orange juice nutritional supplement at breakfast  4. Magic cup at lunch     Called and gave VO to nurse. Verbalized understanding.    Beth Day RN

## 2018-04-12 NOTE — TELEPHONE ENCOUNTER
P Family Medicine phone call message- general phone call:    Reason for call: corina harman from [facility] want to speak with dr horton nurse regarding   Concern about  Discharge a pt would you please contacte    Return call needed: Yes    OK to leave a message on voice mail? Yes    Primary language: English      needed? No    Call taken on April 12, 2018 at 4:01 PM by Homero Dawson

## 2018-04-12 NOTE — TELEPHONE ENCOUNTER
RN returned call to corina harman. Unable to reach, left  with name and callback number    Wilda Ambrosio RN

## 2018-04-13 NOTE — TELEPHONE ENCOUNTER
Birdie uS returned missed call. Birdie Su stated that when calling back if she does not answer to press zero so it will page her. Okay to leave message.    Vanessa Soliz, Patient Representative

## 2018-04-13 NOTE — TELEPHONE ENCOUNTER
RN called social work back. Birdie harman states they are going to be discharging pt home. Last covered day is Monday-possibly discharging tuesday. Patient will be discharged hme this time with PT/OT/RN/XAVI/DEYSI.  is worried pt may have a little depression- PHQ9 was 10. Facility will fax over notes to clinic    Message routed to PCP as CHASE Ambrosio RN

## 2018-04-18 ENCOUNTER — TELEPHONE (OUTPATIENT)
Dept: FAMILY MEDICINE | Facility: CLINIC | Age: 83
End: 2018-04-18

## 2018-04-18 NOTE — TELEPHONE ENCOUNTER
[facility] phone call message- general phone call:    Reason for call: Arleth called to speak with a nurse of with Dr BUSHRA Richards regarding charge planning and need advise on medication. She states pt lost a lot of weight and got readmitted . Pt is eating ok now but not sure if she is going to eat once she got discharged or if the living environment change She states both  and wife are very vulnerable and want to discuss the future plan. Please give her a call.     Return call needed: Yes    OK to leave a message on voice mail? Yes    Primary language: English      needed? No    Call taken on April 18, 2018 at 5:07 PM by Nona Howard

## 2018-04-19 ENCOUNTER — DOCUMENTATION ONLY (OUTPATIENT)
Dept: FAMILY MEDICINE | Facility: CLINIC | Age: 83
End: 2018-04-19

## 2018-04-19 NOTE — PROGRESS NOTES
"When opening a documentation only encounter, be sure to enter in \"Chief Complaint\" Forms and in \" Comments\" Title of form, description if needed.    Anca is a 89 year old  female  Form received via: Mail  Form now resides in: Provider Ready    Carson Mi CMA                Form has been completed by provider.     Form sent out via: Mailed back in envelope provided  Patient informed: No  Output date: April 18, 2018    Carson Mi CMA      **Please close the encounter**      "

## 2018-04-19 NOTE — PROGRESS NOTES
"When opening a documentation only encounter, be sure to enter in \"Chief Complaint\" Forms and in \" Comments\" Title of form, description if needed.    Anca is a 89 year old  female  Form received via: Fax  Form now resides in: Provider Ready    Carson Mi CMA                Form has been completed by provider.     Form sent out via: Fax to [phone]  Patient informed: No, Reason:Confirmed by fax confirmation  Output date: April 18, 2018    Carson Mi CMA      **Please close the encounter**      "

## 2018-04-19 NOTE — TELEPHONE ENCOUNTER
RN attempted to return call and gather more information about specific questions. Left name and call back number.    If caller returns call, please gather specific questions/concerns to pass on to PCP.    Beth Day RN

## 2018-04-20 NOTE — TELEPHONE ENCOUNTER
RN returned call to [facility]. Left detailed VM on secure line relaying patient has appt schedule with PCP on 4/25 at 120    Wilda Ambrosio RN

## 2018-04-20 NOTE — TELEPHONE ENCOUNTER
Birdie harman returned call to RN.    States patient has completed her therapy as she has improved and eating well.     Birdie harman states they are concerned about what pt's  can do for pt at home and concern for his full understanding on what her needs are. Facility is recommending 24hour supervision but  wants to bring her home     Pt will have [facility] set up (need face to face visit with MD) but Birdie harman would recommend more services. Pt's kids have not been involved.     Pt has an appt with PCP on 4/25. Pt will not discharge until after appointment     Wilda Ambrosio RN

## 2018-04-20 NOTE — TELEPHONE ENCOUNTER
Birdie Su-- from [facility], called back to speak with Nurse Beth. [phone]; Anca has completed her therapies and they are wanting to send her home because it costs her to be there every day.  Her  would be the primary caregiver and there is concern for him as well.  She has appetite and weight loss issues but does eat while there.  They are wanting to schedule an appointment in clinic with Dr. Richards but he is unavailable so they are thinking of having Dr. Alia Richards see her in the first available same day appointment in May.  Please return call.

## 2018-04-23 NOTE — TELEPHONE ENCOUNTER
Called Birdie Su to get more information on patient's discharge needs and options before Wednesday's appointment with PCP.  Message left.    Tanja Quiroz  /Care Coordinator

## 2018-04-24 ENCOUNTER — CARE COORDINATION (OUTPATIENT)
Dept: FAMILY MEDICINE | Facility: CLINIC | Age: 83
End: 2018-04-24

## 2018-04-24 ENCOUNTER — DOCUMENTATION ONLY (OUTPATIENT)
Dept: FAMILY MEDICINE | Facility: CLINIC | Age: 83
End: 2018-04-24

## 2018-04-24 NOTE — PROGRESS NOTES
Called Birdie Su,  from [facility]  ([phone]) to get an update of discharge plans. Birdie Su said that they had a care conference on April 17th as that was when pt was discharged from [facility] and Medicare coverage ended. Pt and spouse were reportedly ambivalent about readiness to come home, and nursing home staff also had some concerns about spouse's ability to care for pt at home, so it was decided to have pt stay until a PCP visit so that they could have a conversation about readiness to return home and safety there. Pt has remained at facility with private pay.    She also said that the nursing home is concerned about pt weight loss. Her weight was 109 on 3/8/18 when she admitted to [facility] from [facility].  Pt was discharged from [facility] on 3/20/18 weighing 103.8 pounds, and then returned for readmission on 3/28/18 at 96.8 pounds after spouse reported that pt was not eating well at home.  Pt is currently 97.4. If pt is discharged home, she recommends more frequent PCP visits and monitoring weight.    She is also wondering if PCP might recommend an appetite stimulant and is worried that pt may be becoming depressed. Pt is difficult to coax out of her room at [facility] and they are concerned that she will stay in bed too much at home.      Birdie uS said that at this time pt should qualify for home care if PCP states that she is homebound, and recommends skilled nursing for medication monitoring and set-up, PT, OT and home health aid. She will check with [facility] line to see if there are any other services available to have someone in the home so that spouse can continue to leave occasionally for shopping or activities.  She would like to see spouse commit to utilizing on-site congregate dining at home which is available but they have not used it in the past.    Pt is due on clinic on 4/25/18 from [facility] to discuss readiness to discharge to home.  Pt can also remain in nursing home with private pay if  desired.    Tanja Quiroz  /Care Coordinator

## 2018-04-24 NOTE — PROGRESS NOTES
"When opening a documentation only encounter, be sure to enter in \"Chief Complaint\" Forms and in \" Comments\" Title of form, description if needed.    Anca is a 89 year old  female  Form received via: Mail  Form now resides in: Provider Ready    Carson Mi CMA                Form has been completed by provider.     Form sent out via: Mailed to [facility]  Patient informed: No, Reason:Forms were mailed  Output date: April 24, 2018    Carson Mi CMA      **Please close the encounter**      "

## 2018-04-25 ENCOUNTER — OFFICE VISIT (OUTPATIENT)
Dept: FAMILY MEDICINE | Facility: CLINIC | Age: 83
End: 2018-04-25
Payer: MEDICARE

## 2018-04-25 VITALS
DIASTOLIC BLOOD PRESSURE: 65 MMHG | OXYGEN SATURATION: 97 % | HEART RATE: 74 BPM | SYSTOLIC BLOOD PRESSURE: 118 MMHG | RESPIRATION RATE: 16 BRPM | TEMPERATURE: 97.8 F | BODY MASS INDEX: 16.79 KG/M2 | WEIGHT: 97.8 LBS

## 2018-04-25 DIAGNOSIS — G30.1 LATE ONSET ALZHEIMER'S DISEASE WITHOUT BEHAVIORAL DISTURBANCE (H): ICD-10-CM

## 2018-04-25 DIAGNOSIS — F02.80 LATE ONSET ALZHEIMER'S DISEASE WITHOUT BEHAVIORAL DISTURBANCE (H): ICD-10-CM

## 2018-04-25 DIAGNOSIS — R53.2 COMPLETE IMMOBILITY DUE TO SEVERE PHYSICAL DISABILITY OR FRAILTY (H): ICD-10-CM

## 2018-04-25 DIAGNOSIS — R63.4 LOSS OF WEIGHT: Primary | ICD-10-CM

## 2018-04-25 DIAGNOSIS — R19.03 RIGHT LOWER QUADRANT ABDOMINAL MASS: ICD-10-CM

## 2018-04-25 ASSESSMENT — ENCOUNTER SYMPTOMS
MYALGIAS: 0
UNEXPECTED WEIGHT CHANGE: 1
NERVOUS/ANXIOUS: 0
FATIGUE: 0
FEVER: 0
GASTROINTESTINAL NEGATIVE: 1
CONFUSION: 1
DYSPHORIC MOOD: 0
RESPIRATORY NEGATIVE: 1
ACTIVITY CHANGE: 0
WEAKNESS: 1
CARDIOVASCULAR NEGATIVE: 1

## 2018-04-25 NOTE — MR AVS SNAPSHOT
After Visit Summary   4/25/2018    Anca Alanis    MRN: [id number]           Patient Information     Date Of Birth          5/2/1924        Visit Information        Provider Department      4/25/2018 1:20 PM Rafat Richards MD [facility]        Today's Diagnoses     Loss of weight    -  1    Complete immobility due to severe physical disability or frailty (H)        Late onset Alzheimer's disease without behavioral disturbance        Right lower quadrant abdominal mass          Care Instructions    1.  Discharge to home tomorrow.          Follow-ups after your visit        Additional Services     Home Care Nursing Referral ([place])       **Order classes of: [facility], [facility] and [facility] will route to the Home Care and Hospice Referral Pool.  Home Care or Hospice will then contact the patient to schedule their appointment.**    If you do not hear from Home Care and Hospice, or you would like to call to schedule, please call the referring place of service that your provider has listed below.  ______________________________________________________________________    Your provider has referred you to: [facility]: [facility] [phone]   [email or web address]    Extended Emergency Contact Information  Primary Emergency Contact: ZekeTORIThad SILVA  Address: [address] [place] [id number] [facility]  Home Phone: [phone]  Relation: Spouse    Patient Anticipated Discharge Date: 4/26/18   RN, PT, HHA to begin 24 - 48 hours after discharge.  PLEASE EVALUATE AND TREAT (Evaluation timeline is 24 - 48 hrs. Please call if there is need for a variance to this timeline).    REASON FOR REFERRAL: Assessment & Treatment: PT, RN and OT    ADDITIONAL SERVICES NEEDED: Home health aide    OTHER PERTINENT INFORMATION: Patient was last seen by provider on 4/25/18 for  Weight loss and frailty.    Current Outpatient Prescriptions:  acetaminophen (TYLENOL) 325 MG tablet, Take 2 tablets (650 mg) by mouth At Bedtime, Disp: 100 tablet, Rfl:   alendronate (FOSAMAX) 70 MG tablet, Take 1 tablet (70 mg) by mouth every 7 days (Patient taking differently: Take 70 mg by mouth every 7 days On Thursdays.), Disp: 12 tablet, Rfl: 3  Calcium Carbonate-Vitamin D (CALCIUM + D PO), 315 mg-200 IU tablet. Take 0.5 tablet by mouth daily with meals., Disp: , Rfl:   donepezil (ARICEPT) 5 MG tablet, Take 1 tablet (5 mg) by mouth At Bedtime for memory., Disp: 90 tablet, Rfl: 1  melatonin 3 MG tablet, Take 1 tablet (3 mg) by mouth At Bedtime, Disp: 90 tablet, Rfl: 3  memantine (NAMENDA) 5 MG tablet, Take 1 tablet (5 mg) by mouth 2 times daily for memory., Disp: 180 tablet, Rfl: 1  Multiple Vitamin (MULTIVITAMIN OR), Take 0.5 tablets by mouth daily , Disp: , Rfl:   zolpidem (AMBIEN) 5 MG tablet, Take 5 mg by mouth, Disp: , Rfl:       Patient Active Problem List:     Anxiety state     Backache     Dysthymic disorder     Hearing loss     Spinal stenosis, lumbar region, without neurogenic claudication     Osteoarthritis of multiple joints     Osteoporosis     Scoliosis (and kyphoscoliosis), idiopathic     Health Care Home     Sleep disorder     Moderate stage glaucoma - Left Eye     Senile cataracts of both eyes     Late onset Alzheimer's disease without behavioral disturbance     Abdominal pain      Documentation of Face to Face and Certification for Home Health Services    I certify that patient, Anca Alanis is under my care and that I, or a Nurse Practitioner or Physician's Assistant working with me, had a face-to-face encounter that meets the physician face-to-face encounter requirements with this patient on: 4/25/2018.    This encounter with the patient was in whole, or in part, for the following medical condition, which is the primary reason for Home Health Care: frailty, weight loss, dementia.    I  certify that, based on my findings, the following services are medically necessary Home Health Services: Nursing, Occupational Therapy and Physical Therapy    My clinical findings support the need for the above services because: Nurse is needed: To provide assessment and oversight required in the home to assure adherence to the medical plan due to: frailty and weight loss.., Occupational Therapy Services are needed to assess and treat cognitive ability and address ADL safety due to impairment in strength and cognitive ability. and Physical Therapy Services are needed to assess and treat the following functional impairments: strength.    Further, I certify that my clinical findings support that this patient is homebound (i.e. absences from home require considerable and taxing effort and are for medical reasons or Muslim services or infrequently or of short duration when for other reasons) because: pt is homebound due to advanced dementia, weight loss, and frailty.  Leaving the home would pose extreme difficulty.      Based on the above findings, I certify that this patient is confined to the home and needs intermittent skilled nursing care, physical therapy and/or speech therapy.  The patient is under my care, and I have initiated the establishment of the plan of care.  This patient will be followed by a physician who will periodically review the plan of care.    Physician/Provider to provide follow up care: Rafat Richards certified Physician at time of discharge: Susie    Please be aware that coverage of these services is subject to the terms and limitations of your health insurance plan.  Call member services at your health plan with any benefit or coverage questions.                  Follow-up notes from your care team     Return in about 3 weeks (around 5/16/2018).      Your next 10 appointments already scheduled     May 10, 2018  1:40 PM CDT   Return Visit with Alia Richards MD    [facility] ([facility])     [address],  [address]  [facility] [address]   [phone]            May 23, 2018  3:40 PM CDT   Return Visit with Rafat Richards MD   [facility] ([facility])     [address],  [address]  [facility] [address]   [phone]              Who to contact     Please call your clinic at [phone] to:    Ask questions about your health    Make or cancel appointments    Discuss your medicines    Learn about your test results    Speak to your doctor            Additional Information About Your Visit        MyChart Information     Recommendi is an electronic gateway that provides easy, online access to your medical records. With Recommendi, you can request a clinic appointment, read your test results, renew a prescription or communicate with your care team.     To sign up for [employer] visit the website at [email or web address]   You will be asked to enter the access code listed below, as well as some personal information. Please follow the directions to create your username and password.     Your access code is: [email or web address]  Expires: 2018  1:56 PM     Your access code will  in 90 days. If you need help or a new code, please contact your [facility] or call [phone] for assistance.        Care EveryWhere ID     This is your Care EveryWhere ID. This could be used by other organizations to access your [place] medical records  [id number]        Your Vitals Were     Pulse Temperature Respirations Pulse Oximetry BMI (Body Mass Index)       74 97.8  F (36.6  C) (Oral) 16 97% 16.79 kg/m2        Blood Pressure from Last 3 Encounters:   18 118/65   18 131/60   18 128/77    Weight from Last 3 Encounters:   18 97 lb 12.8 oz (44.4 kg)   18 99 lb 1.6 oz (45 kg)   18 101 lb 12.8 oz (46.2 kg)              We Performed the Following     Home Care  Nursing Referral ([place])          Today's Medication Changes          These changes are accurate as of 4/25/18  2:17 PM.  If you have any questions, ask your nurse or doctor.               These medicines have changed or have updated prescriptions.        Dose/Directions    alendronate 70 MG tablet   Commonly known as:  FOSAMAX   This may have changed:  additional instructions   Used for:  Osteoporosis        Dose:  70 mg   Take 1 tablet (70 mg) by mouth every 7 days   Quantity:  12 tablet   Refills:  3                Primary Care Provider Office Phone # Fax #    Rafat Richards [phone]       [address] [address]        Equal Access to Services     DEE DEE [facility] : Hadii mark anthony Shields, wacharlie gerber, qapeneolpe kaalmadarin richardson, kiana burgos . So [facility] [phone].    ATENCIÓN: Si habla español, tiene a gillette disposición servicios gratuitos de asistencia lingüística. LlSt[facility] [phone].    We comply with applicable federal civil rights laws and [place] laws. We do not discriminate on the basis of race, color, national origin, age, disability, sex, sexual orientation, or gender identity.            Thank you!     Thank you for choosing [facility]  for your care. Our goal is always to provide you with excellent care. Hearing back from our patients is one way we can continue to improve our services. Please take a few minutes to complete the written survey that you may receive in the mail after your visit with us. Thank you!             Your Updated Medication List - Protect others around you: Learn how to safely use, store and throw away your medicines at www.disposemymeds.org.          This list is accurate as of 4/25/18  2:17 PM.  Always use your most recent med list.                   Brand Name Dispense Instructions for use Diagnosis    acetaminophen 325 MG tablet    TYLENOL    100 tablet    Take 2 tablets (650  mg) by mouth At Bedtime    Abdominal mass, RLQ (right lower quadrant)       alendronate 70 MG tablet    FOSAMAX    12 tablet    Take 1 tablet (70 mg) by mouth every 7 days    Osteoporosis       CALCIUM + D PO      315 mg-200 IU tablet. Take 0.5 tablet by mouth daily with meals.        donepezil 5 MG tablet    ARIcept    90 tablet    Take 1 tablet (5 mg) by mouth At Bedtime for memory.    Late onset Alzheimer's disease without behavioral disturbance       melatonin 3 MG tablet     90 tablet    Take 1 tablet (3 mg) by mouth At Bedtime    Sleep disorder       memantine 5 MG tablet    NAMENDA    180 tablet    Take 1 tablet (5 mg) by mouth 2 times daily for memory.    Late onset Alzheimer's disease without behavioral disturbance       MULTIVITAMIN PO      Take 0.5 tablets by mouth daily        zolpidem 5 MG tablet    AMBIEN     Take 5 mg by mouth

## 2018-04-25 NOTE — PROGRESS NOTES
HPI:       Anca Alanis is a 89 year old who presents for the following  Patient presents with:  RECHECK: Follow up  Sleep Problem: Difficulty falling asleep, low Energy     Has been at [facility].  Feels OK but wants to go home.  Unable to gain weight.  Staff at facility are concerned that pt and  will be unable to manage on their own.  Recommending C.  Pt and  are in agreement.    She has no abdominal symptoms.  Mood is fair.      Problem, Medication and Allergy Lists were reviewed and are current.  Patient is an established patient of this clinic.         Review of Systems:   Review of Systems   Constitutional: Positive for unexpected weight change. Negative for activity change, fatigue and fever.   Respiratory: Negative.    Cardiovascular: Negative.    Gastrointestinal: Negative.    Musculoskeletal: Negative for gait problem and myalgias.   Neurological: Positive for weakness.   Psychiatric/Behavioral: Positive for confusion. Negative for behavioral problems and dysphoric mood. The patient is not nervous/anxious.              Physical Exam:   Patient Vitals for the past 24 hrs:   BP Temp Temp src Pulse Resp SpO2 Weight   04/25/18 1328 118/65 97.8  F (36.6  C) Oral 74 16 97 % 97 lb 12.8 oz (44.4 kg)     Body mass index is 16.79 kg/(m^2).  Vitals were reviewed and were normal     Physical Exam   Constitutional: She appears well-developed. No distress.   Thin.     HENT:   Head: Normocephalic and atraumatic.   Neck: Neck supple. No thyromegaly present.   Cardiovascular: Normal rate, regular rhythm and normal heart sounds.  Exam reveals no gallop.    No murmur heard.  Pulmonary/Chest: Effort normal. No respiratory distress. She has no wheezes. She has no rales.   Abdominal: Bowel sounds are normal. She exhibits no distension and no mass. There is no tenderness.   Musculoskeletal: She exhibits no edema or tenderness.   Lymphadenopathy:     She has no cervical adenopathy.    Neurological: She is alert.   Psychiatric: She has a normal mood and affect.   Vitals reviewed.        Results:     Results from last visit:  Admission on 03/03/2018, Discharged on 03/08/2018   Component Date Value Ref Range Status     WBC 03/03/2018 11.3* 4.0 - 11.0 10e9/L Final     RBC Count 03/03/2018 3.89  3.8 - 5.2 10e12/L Final     Hemoglobin 03/03/2018 12.5  11.7 - 15.7 g/dL Final     Hematocrit 03/03/2018 38.3  35.0 - 47.0 % Final     MCV 03/03/2018 99  78 - 100 fl Final     MCH 03/03/2018 32.1  26.5 - 33.0 pg Final     MCHC 03/03/2018 32.6  31.5 - 36.5 g/dL Final     RDW 03/03/2018 13.0  10.0 - 15.0 % Final     Platelet Count 03/03/2018 315  150 - 450 10e9/L Final     Diff Method 03/03/2018 Automated Method   Final     % Neutrophils 03/03/2018 88.4  % Final     % Lymphocytes 03/03/2018 3.7  % Final     % Monocytes 03/03/2018 7.1  % Final     % Eosinophils 03/03/2018 0.2  % Final     % Basophils 03/03/2018 0.1  % Final     % Immature Granulocytes 03/03/2018 0.5  % Final     Nucleated RBCs 03/03/2018 0  0 /100 Final     Absolute Neutrophil 03/03/2018 9.9* 1.6 - 8.3 10e9/L Final     Absolute Lymphocytes 03/03/2018 0.4* 0.8 - 5.3 10e9/L Final     Absolute Monocytes 03/03/2018 0.8  0.0 - 1.3 10e9/L Final     Absolute Eosinophils 03/03/2018 0.0  0.0 - 0.7 10e9/L Final     Absolute Basophils 03/03/2018 0.0  0.0 - 0.2 10e9/L Final     Abs Immature Granulocytes 03/03/2018 0.1  0 - 0.4 10e9/L Final     Absolute Nucleated RBC 03/03/2018 0.0   Final     Sodium 03/03/2018 134  133 - 144 mmol/L Final     Potassium 03/03/2018 4.0  3.4 - 5.3 mmol/L Final     Chloride 03/03/2018 97  94 - 109 mmol/L Final     Carbon Dioxide 03/03/2018 25  20 - 32 mmol/L Final     Anion Gap 03/03/2018 11  3 - 14 mmol/L Final     Glucose 03/03/2018 116* 70 - 99 mg/dL Final     Urea Nitrogen 03/03/2018 33* 7 - 30 mg/dL Final     Creatinine 03/03/2018 0.83  0.52 - 1.04 mg/dL Final     GFR Estimate 03/03/2018 64  >60 mL/min/1.7m2 Final    Non   GFR Calc     GFR Estimate If Black 03/03/2018 78  >60 mL/min/1.7m2 Final    African American GFR Calc     Calcium 03/03/2018 9.1  8.5 - 10.1 mg/dL Final     INR 03/03/2018 1.27* 0.86 - 1.14 Final     PTT 03/03/2018 30  22 - 37 sec Final     Color Urine 03/03/2018 Yellow   Final     Appearance Urine 03/03/2018 Clear   Final     Glucose Urine 03/03/2018 Negative  NEG^Negative mg/dL Final     Bilirubin Urine 03/03/2018 Negative  NEG^Negative Final     Ketones Urine 03/03/2018 40* NEG^Negative mg/dL Final     Specific Gravity Urine 03/03/2018 1.010  1.003 - 1.035 Final     Blood Urine 03/03/2018 Negative  NEG^Negative Final     pH Urine 03/03/2018 6.0  5.0 - 7.0 pH Final     Protein Albumin Urine 03/03/2018 10* NEG^Negative mg/dL Final     Urobilinogen mg/dL 03/03/2018 Normal  0.0 - 2.0 mg/dL Final     Nitrite Urine 03/03/2018 Negative  NEG^Negative Final     Leukocyte Esterase Urine 03/03/2018 Small* NEG^Negative Final     Source 03/03/2018 Clean catch urine   Final     WBC Urine 03/03/2018 5  0 - 5 /HPF Final     RBC Urine 03/03/2018 4* 0 - 2 /HPF Final     Squamous Epithelial /HPF Urine 03/03/2018 1  0 - 1 /HPF Final     Mucous Urine 03/03/2018 Present* NEG^Negative /LPF Final     Specimen Description 03/03/2018 Midstream Urine   Final     Special Requests 03/03/2018 Specimen received in preservative   Final     Culture Micro 03/03/2018    Final                    Value:10,000 to 50,000 colonies/mL  mixed urogenital jaswant  Susceptibility testing not routinely done       Radiologist flags 03/03/2018 Findings suspicious for right lower quadrant localized*  Final     Lactic Acid 03/04/2018 0.8  0.7 - 2.0 mmol/L Final     Sodium 03/04/2018 138  133 - 144 mmol/L Final     Potassium 03/04/2018 3.9  3.4 - 5.3 mmol/L Final     Chloride 03/04/2018 107  94 - 109 mmol/L Final     Carbon Dioxide 03/04/2018 20  20 - 32 mmol/L Final     Anion Gap 03/04/2018 12  3 - 14 mmol/L Final     WBC 03/04/2018 11.3* 4.0 -  11.0 10e9/L Final     RBC Count 03/04/2018 3.21* 3.8 - 5.2 10e12/L Final     Hemoglobin 03/04/2018 10.2* 11.7 - 15.7 g/dL Final     Hematocrit 03/04/2018 31.8* 35.0 - 47.0 % Final     MCV 03/04/2018 99  78 - 100 fl Final     MCH 03/04/2018 31.8  26.5 - 33.0 pg Final     MCHC 03/04/2018 32.1  31.5 - 36.5 g/dL Final     RDW 03/04/2018 13.0  10.0 - 15.0 % Final     Platelet Count 03/04/2018 275  150 - 450 10e9/L Final     INR 03/05/2018 1.30* 0.86 - 1.14 Final     WBC 03/05/2018 11.1* 4.0 - 11.0 10e9/L Final     RBC Count 03/05/2018 3.30* 3.8 - 5.2 10e12/L Final     Hemoglobin 03/05/2018 10.4* 11.7 - 15.7 g/dL Final     Hematocrit 03/05/2018 32.1* 35.0 - 47.0 % Final     MCV 03/05/2018 97  78 - 100 fl Final     MCH 03/05/2018 31.5  26.5 - 33.0 pg Final     MCHC 03/05/2018 32.4  31.5 - 36.5 g/dL Final     RDW 03/05/2018 13.1  10.0 - 15.0 % Final     Platelet Count 03/05/2018 290  150 - 450 10e9/L Final     Diff Method 03/05/2018 Automated Method   Final     % Neutrophils 03/05/2018 85.9  % Final     % Lymphocytes 03/05/2018 8.4  % Final     % Monocytes 03/05/2018 5.0  % Final     % Eosinophils 03/05/2018 0.3  % Final     % Basophils 03/05/2018 0.1  % Final     % Immature Granulocytes 03/05/2018 0.3  % Final     Nucleated RBCs 03/05/2018 0  0 /100 Final     Absolute Neutrophil 03/05/2018 9.6* 1.6 - 8.3 10e9/L Final     Absolute Lymphocytes 03/05/2018 0.9  0.8 - 5.3 10e9/L Final     Absolute Monocytes 03/05/2018 0.6  0.0 - 1.3 10e9/L Final     Absolute Eosinophils 03/05/2018 0.0  0.0 - 0.7 10e9/L Final     Absolute Basophils 03/05/2018 0.0  0.0 - 0.2 10e9/L Final     Abs Immature Granulocytes 03/05/2018 0.0  0 - 0.4 10e9/L Final     Absolute Nucleated RBC 03/05/2018 0.0   Final      03/05/2018 29  0 - 30 U/mL Final    Assay Method:  Chemiluminescence using Siemens Centaur XP     Cancer Antigen 19-9 03/05/2018 4  0 - 37 U/mL Final    Comment: (Note)  INTERPRETIVE INFORMATION: Cancer Antigen-GI (CA 19-9)  This test  uses Roche CA 19-9 electrochemiluminescent   immunoassay. Results obtained with different test methods   or kits cannot be used interchangeably. CA 19-9 value is   useful in monitoring pancreatic, hepatobiliary, gastric,   hepatocellular, and colorectal cancer. CA 19-9 value,   regardless of level, should not be interpreted as absolute   evidence of the presence or absence of malignant disease.  Performed by [place],  [facility] [place] [place] [address] [phone]  [email or web address], Gerald Mojica MD, Lab. Director       CEA 03/05/2018 <0.5  0 - 2.5 ug/L Final    Assay Method:  Chemiluminescence using Siemens Centaur XP     Albumin 03/05/2018 1.8* 3.4 - 5.0 g/dL Final     Prealbumin 03/05/2018 4* 15 - 45 mg/dL Final     WBC 03/07/2018 6.8  4.0 - 11.0 10e9/L Final     RBC Count 03/07/2018 3.22* 3.8 - 5.2 10e12/L Final     Hemoglobin 03/07/2018 10.1* 11.7 - 15.7 g/dL Final     Hematocrit 03/07/2018 31.3* 35.0 - 47.0 % Final     MCV 03/07/2018 97  78 - 100 fl Final     MCH 03/07/2018 31.4  26.5 - 33.0 pg Final     MCHC 03/07/2018 32.3  31.5 - 36.5 g/dL Final     RDW 03/07/2018 13.2  10.0 - 15.0 % Final     Platelet Count 03/07/2018 238  150 - 450 10e9/L Final     Diff Method 03/07/2018 Automated Method   Final     % Neutrophils 03/07/2018 77.0  % Final     % Lymphocytes 03/07/2018 10.3  % Final     % Monocytes 03/07/2018 9.1  % Final     % Eosinophils 03/07/2018 2.4  % Final     % Basophils 03/07/2018 0.3  % Final     % Immature Granulocytes 03/07/2018 0.9  % Final     Nucleated RBCs 03/07/2018 0  0 /100 Final     Absolute Neutrophil 03/07/2018 5.2  1.6 - 8.3 10e9/L Final     Absolute Lymphocytes 03/07/2018 0.7* 0.8 - 5.3 10e9/L Final     Absolute Monocytes 03/07/2018 0.6  0.0 - 1.3 10e9/L Final     Absolute Eosinophils 03/07/2018 0.2  0.0 - 0.7 10e9/L Final     Absolute Basophils 03/07/2018 0.0  0.0 - 0.2 10e9/L Final     Abs Immature Granulocytes 03/07/2018 0.1  0 - 0.4 10e9/L Final     Absolute Nucleated RBC  "03/07/2018 0.0   Final     INR 03/07/2018 1.24* 0.86 - 1.14 Final     Copath Report 03/07/2018    Final                    Value:Patient Name: MAREN BRADLEY  MR#: [id number]  Specimen #: [id number]  Collected: 3/7/2018  Received: 3/7/2018  Reported: 3/9/2018 00:32  Ordering Phy(s): CHEYENNE KAUR  Additional Phy(s): BO CARPIO    For improved result formatting, select 'View Enhanced Report Format' under   Linked Documents section.    SPECIMEN(S):  Pelvic mass biopsy    FINAL DIAGNOSIS:  PELVIC MASS, ULTRASOUND GUIDED CORE BIOPSY:  - Necroinflammatory debris  - Fibrovascular tissue and occasional degenerated skeletal muscle fibers,   with acute and chronic inflammation  - See comment    COMMENT:  If there is clinical concern for malignancy, additional tissue sampling is   recommended.    Correlation with the concurrent microbiology results is required.    The concurrent pelvic fluid cytology is pending (see report [id number]).    I have personally reviewed all specimens and/or slides, including the   listed special stains, and used them  with my medical judgement to determi                          ne or confirm the final diagnosis.    Electronically signed out by:    Ariana Ma M.D., [facility]    CLINICAL HISTORY:  The patient is a 89 year old woman with abdominal pain and weight loss.    Physical examination and imaging  studies show a right lower quadrant mass (12 cm multicystic mass, by   ultrasound).  Procedure: ultrasound  guided core biopsy and aspiration of right lower quadrant/pelvic mass.  GROSS:  The specimen is received in formalin with proper patient identification,   labeled \"pelvic mass biopsy\".  It  consists of multiple fragments of tan soft tissue ranging from 0.1-0.5 cm   in greatest dimension.  The specimen  is wrapped and entirely submitted in cassette A1. (Dictated by: Becky VELAZCO [facility] 3/7/2018 03:04 PM)    MICROSCOPIC:  Microscopic " examination is performed.    CPT Codes:  A: 05248-HS2    TESTING LAB LOCATION:  [facility], [address]   [address] [phone]                              COLLECTION SITE:  Client: [facility]  Location: [facility] (B) Report 03/07/2018    Final                    Value:Patient Name: MAREN BRADLEY  MR#: [id number]  Specimen #: [id number]  Collected: 3/7/2018  Received: 3/8/2018  Reported: 3/9/2018 12:26  Ordering Phy(s): CHEYENNE KAUR  Additional Phy(s): BO HODGES    For improved result formatting, select 'View Enhanced Report Format' under   Linked Documents section.    SPECIMEN/STAIN PROCESS:  Pelvic fluid       Pap-Cyto x 1, Gamble's stain-cyto x 1, Cell Block w/ H&E-Cyto x 1,   Cell Block, Level 2 x 1, Cell Block,  Level 3 x 1    ----------------------------------------------------------------    CYTOLOGIC INTERPRETATION:    Pelvic fluid:   Negative for malignancy.  - Acute inflammation present.  Please see comment.  Specimen Adequacy: Satisfactory for evaluation.    COMMENT:  The specimen contains abundant acute inflammation and areas of necrotic   debris consistent with abscess. There  is no definitive evidence of malignancy.    I have personally reviewed all specimens and/or slides, including the   listed special s                          tains, and used them  with my medical judgement to determine or confirm the final diagnosis.    Electronically signed out by:  Danette Shah M.D., [facility]    Processed and screened at [facility]    CLINICAL HISTORY:  The patient is a 89-year-old female with a complex solid/cystic right   pelvic mass.    ,    GROSS:  Pelvic fluid: Received 5 ml of red/black, thick, cloudy fluid, processed   as 1 Pap stained AutoCyte, 1 Gamble  stained cytospin and one  hematoxylin and eosin stained cell block.    MICROSCOPIC:  Microscopic examination performed.    Jude Blackwood MD, Cytopathology Fellow          Danette Shah MD    CPT Codes:  A: 46564-OAPYSHC, 63811-WFVUFED, 48228-WLZ, HCB    TESTING LAB LOCATION:  [facility], [address]   [address] [phone]    COLLECTION SITE:  Client:  [facility]  Location:  [facility] (B)                              Resident  [id number]       Specimen Description 03/07/2018 Pelvic Fluid   Final     Culture Micro 03/07/2018 *  Final                    Value:Moderate growth  Escherichia coli       Culture Micro 03/07/2018 *  Final                    Value:Moderate growth  Streptococcus anginosus       Specimen Description 03/07/2018 Pelvic Fluid   Final     Gram Stain 03/07/2018 *  Final                    Value:Many  Gram negative rods       Gram Stain 03/07/2018 *  Final                    Value:Many  Gram positive cocci       Gram Stain 03/07/2018    Final                    Value:Many  WBC'S seen  predominantly PMN's       Specimen Description 03/07/2018 Pelvic Fluid   Final     Special Requests 03/07/2018 Received in anaerobic tubes.   Final     Culture Micro 03/07/2018 *  Final                    Value:Heavy growth  Bacteroides fragilis  Susceptibility testing not routinely done       Culture Micro 03/07/2018 *  Final                    Value:Heavy growth  Fusobacterium nucleatum  Susceptibility testing not routinely done         Assessment and Plan     Anca was seen today for recheck and sleep problem.    Diagnoses and all orders for this visit:    Loss of weight - abdominal mass has resolved but overall function is precarious and frail.  Weight loss and inability to gain weight is a real concern.  Considerable caregiver burden on her .  Pt and  seen with our care coordinator Tanja Quiroz to coordinate d/c from  [facility] to home with services.  This will happen tomorrow.  I will d/c donepezil as it could be affecting nutritional status.  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  She will essentially be homebound due to weakness, advanced dementia and weight loss.  Leaving home would constitute great hardship for pt and .  -     Home Care Nursing Referral ([place])    Late onset Alzheimer's disease without behavioral disturbance - continue memantine    Right lower quadrant abdominal mass - resolved.      There are no discontinued medications.  Options for treatment and follow-up care were reviewed with the patient. Anca Alanis  engaged in the decision making process and verbalized understanding of the options discussed and agreed with the final plan.    Rafat Richards MD

## 2018-04-26 ENCOUNTER — TELEPHONE (OUTPATIENT)
Dept: FAMILY MEDICINE | Facility: CLINIC | Age: 83
End: 2018-04-26

## 2018-04-26 NOTE — TELEPHONE ENCOUNTER
[facility] phone call message - order or referral request for patient:     Order or referral being requested: orders were faxed here yesterday for Dr Richards to sign off on but did not get them for the appt yesterday to sign.  I spoke with our nurse here who will page the provider and coordinate getting the form completed and faxed back to [facility]      Additional Comments: please fax form once completed    OK to leave a message on voice mail? Yes    Primary language: English      needed? No    Call taken on April 26, 2018 at 11:07 AM by Marlen Lowe

## 2018-04-26 NOTE — TELEPHONE ENCOUNTER
PCP stated all paper work was filled out and sent with patient after yesterday 4/25 office visit. RN spoke with XAVI vigil who confirmed and stated she would call [facility] to make sure everything was confirmed    Wilda Ambrosio RN

## 2018-04-26 NOTE — TELEPHONE ENCOUNTER
"Form states \"Ok to discharge to home 4/26/18. Ok for [facility] health services to provide: RN medication set up and monitoring, HHA as needed, PT and OT eval and treat, OK to send current medications. Schedule a follow up visit with PCP as needed\"    Message routed to PCP and paged    Wilda Ambrosio RN    "

## 2018-04-28 ENCOUNTER — DOCUMENTATION ONLY (OUTPATIENT)
Dept: CARE COORDINATION | Facility: CLINIC | Age: 83
End: 2018-04-28

## 2018-04-28 NOTE — PROGRESS NOTES
Dear Rafat Barbosa  Medicare Home Health regulations requires [facility] to provide an initial assessment visit either within 48 hours of the patient's return home, or on the physician ordered Start of Care date.    There will be a delay in the Initial Assessment for Anca GALO Zeke; MRN [id number]  We anticipate the Start of care will be 4/29/18      Sincerely [facility]  Yosi Ryan RN  [phone]

## 2018-04-29 ENCOUNTER — MEDICAL CORRESPONDENCE (OUTPATIENT)
Dept: HEALTH INFORMATION MANAGEMENT | Facility: CLINIC | Age: 83
End: 2018-04-29

## 2018-04-30 ENCOUNTER — TELEPHONE (OUTPATIENT)
Dept: FAMILY MEDICINE | Facility: CLINIC | Age: 83
End: 2018-04-30

## 2018-04-30 NOTE — TELEPHONE ENCOUNTER
Returned call to home care nurse to give verbal orders per protocol as requested. Nurse verbalized understanding.    Wilda Ambrosio RN

## 2018-04-30 NOTE — TELEPHONE ENCOUNTER
[facility] Family Medicine phone call message - order or referral request for patient:     Order or referral being requested: PT/OT eval and treat. Skilled nursing 2x/week for 2 weeks, 1x/week for 4 weeks, and 3 PRN.    Additional Comments: verbal order    OK to leave a message on voice mail? Yes    Primary language: English      needed? No    Call taken on April 30, 2018 at 10:27 AM by Debra Diaz

## 2018-05-09 ENCOUNTER — OFFICE VISIT (OUTPATIENT)
Dept: FAMILY MEDICINE | Facility: CLINIC | Age: 83
End: 2018-05-09
Payer: MEDICARE

## 2018-05-09 VITALS
HEART RATE: 77 BPM | WEIGHT: 97.6 LBS | TEMPERATURE: 98.1 F | BODY MASS INDEX: 16.75 KG/M2 | SYSTOLIC BLOOD PRESSURE: 138 MMHG | OXYGEN SATURATION: 98 % | DIASTOLIC BLOOD PRESSURE: 78 MMHG

## 2018-05-09 DIAGNOSIS — R63.4 LOSS OF WEIGHT: Primary | ICD-10-CM

## 2018-05-09 DIAGNOSIS — R54 FRAILTY: ICD-10-CM

## 2018-05-09 ASSESSMENT — ENCOUNTER SYMPTOMS
UNEXPECTED WEIGHT CHANGE: 0
CONFUSION: 1
COUGH: 0
NERVOUS/ANXIOUS: 0
SHORTNESS OF BREATH: 0
ACTIVITY CHANGE: 0
NAUSEA: 0
DIARRHEA: 0
WEAKNESS: 1
DYSPHORIC MOOD: 0
FATIGUE: 0
ABDOMINAL PAIN: 0
FEVER: 0
CHOKING: 0
MYALGIAS: 0

## 2018-05-09 NOTE — MR AVS SNAPSHOT
After Visit Summary   2018    Anca Alanis    MRN: [id number]           Patient Information     Date Of Birth          1924        Visit Information        Provider Department      2018 4:20 PM Rafat Richards MD [facility]        Today's Diagnoses     Loss of weight    -  1    Frailty          Care Instructions    1.  Eat at least 2 times a week at congregant dining.    2.  Keep up the calories and the activity.          Follow-ups after your visit        Follow-up notes from your care team     Return in about 2 weeks (around 2018).      Your next 10 appointments already scheduled     May 23, 2018  3:40 PM CDT   Return Visit with [facility] ([facility])    [address],  [address]  [place] [address]   [phone]              Who to contact     Please call your clinic at [phone] to:    Ask questions about your health    Make or cancel appointments    Discuss your medicines    Learn about your test results    Speak to your doctor            Additional Information About Your Visit        MyChart Information     [employer] is an electronic gateway that provides easy, online access to your medical records. With [employer], you can request a clinic appointment, read your test results, renew a prescription or communicate with your care team.     To sign up for [facility] [employer] visit the website at [email or web address]   You will be asked to enter the access code listed below, as well as some personal information. Please follow the directions to create your username and password.     Your access code is: [email or web address]  Expires: 2018  1:56 PM     Your access code will  in 90 days. If you need help or a new code, please contact your [facility] or call [phone] for assistance.        Care EveryWhere ID     This is your Care EveryWhere ID. This could be used by other  organizations to access your [facility] medical records  [id number]        Your Vitals Were     Pulse Temperature Pulse Oximetry Breastfeeding? BMI (Body Mass Index)       77 98.1  F (36.7  C) (Oral) 98% No 16.75 kg/m2        Blood Pressure from Last 3 Encounters:   05/09/18 138/78   04/25/18 118/65   03/23/18 131/60    Weight from Last 3 Encounters:   05/09/18 97 lb 9.6 oz (44.3 kg)   04/25/18 97 lb 12.8 oz (44.4 kg)   03/23/18 99 lb 1.6 oz (45 kg)              Today, you had the following     No orders found for display         Today's Medication Changes          These changes are accurate as of 5/9/18  5:00 PM.  If you have any questions, ask your nurse or doctor.               These medicines have changed or have updated prescriptions.        Dose/Directions    alendronate 70 MG tablet   Commonly known as:  FOSAMAX   This may have changed:  additional instructions   Used for:  Osteoporosis        Dose:  70 mg   Take 1 tablet (70 mg) by mouth every 7 days   Quantity:  12 tablet   Refills:  3                Primary Care Provider Office Phone # Fax #    Rafat Richards [phone]       [address] [place] [address]        Equal Access to Services     [facility] AH: Lisbeth Shields, wayovanida zabrina, qaybta kaalmada adeamelie, kiana velázquez. So [facility] [phone].    ATENCIÓN: Si habla español, tiene a gillette disposición servicios gratuitos de asistencia lingüística. Llame al [phone].    We comply with applicable federal civil rights laws and [place] laws. We do not discriminate on the basis of race, color, national origin, age, disability, sex, sexual orientation, or gender identity.            Thank you!     Thank you for choosing [facility]  for your care. Our goal is always to provide you with excellent care. Hearing back from our patients is one way we can continue to improve our services. Please take a few minutes to  complete the written survey that you may receive in the mail after your visit with us. Thank you!             Your Updated Medication List - Protect others around you: Learn how to safely use, store and throw away your medicines at www.disposemymeds.org.          This list is accurate as of 5/9/18  5:00 PM.  Always use your most recent med list.                   Brand Name Dispense Instructions for use Diagnosis    acetaminophen 325 MG tablet    TYLENOL    100 tablet    Take 2 tablets (650 mg) by mouth At Bedtime    Abdominal mass, RLQ (right lower quadrant)       alendronate 70 MG tablet    FOSAMAX    12 tablet    Take 1 tablet (70 mg) by mouth every 7 days    Osteoporosis       CALCIUM + D PO      315 mg-200 IU tablet. Take 0.5 tablet by mouth daily with meals.        donepezil 5 MG tablet    ARIcept    90 tablet    Take 1 tablet (5 mg) by mouth At Bedtime for memory.    Late onset Alzheimer's disease without behavioral disturbance       melatonin 3 MG tablet     90 tablet    Take 1 tablet (3 mg) by mouth At Bedtime    Sleep disorder       memantine 5 MG tablet    NAMENDA    180 tablet    Take 1 tablet (5 mg) by mouth 2 times daily for memory.    Late onset Alzheimer's disease without behavioral disturbance       MULTIVITAMIN PO      Take 0.5 tablets by mouth daily        zolpidem 5 MG tablet    AMBIEN     Take 5 mg by mouth

## 2018-05-09 NOTE — PATIENT INSTRUCTIONS
1.  Eat at least 2 times a week at congregant dining.    2.  Keep up the calories and the activity.

## 2018-05-09 NOTE — PROGRESS NOTES
"      HPI:       Anca Alanis is a 89 year old who presents for the following  Patient presents with:  Weight Check: No other concerns    Home with services for 2 weeks.  Has no new complaints.   providing a lot of care and fixing all meals.  They have not used congregant dining which is available in their building.  She spends a significant amount of time in bed.  Mood is OK.  Appetite \"good\" according to .  Home care nurse visiting once/wk.           Problem, Medication and Allergy Lists were reviewed and are current.  Patient is an established patient of this clinic.         Review of Systems:   Review of Systems   Constitutional: Negative for activity change, fatigue, fever and unexpected weight change.   Respiratory: Negative for cough, choking and shortness of breath.    Cardiovascular: Negative for chest pain.   Gastrointestinal: Negative for abdominal pain, diarrhea and nausea.   Musculoskeletal: Negative for gait problem and myalgias.   Neurological: Positive for weakness.   Psychiatric/Behavioral: Positive for confusion. Negative for behavioral problems and dysphoric mood. The patient is not nervous/anxious.              Physical Exam:   Patient Vitals for the past 24 hrs:   BP Temp Temp src Pulse SpO2 Weight   05/09/18 1624 138/78 98.1  F (36.7  C) Oral 77 98 % 97 lb 9.6 oz (44.3 kg)     Body mass index is 16.75 kg/(m^2).  Vitals were reviewed and were normal     Physical Exam   Constitutional: She appears well-developed. No distress.   Thin.     HENT:   Head: Normocephalic and atraumatic.   Neck: Neck supple. No thyromegaly present.   Cardiovascular: Normal rate, regular rhythm and normal heart sounds.  Exam reveals no gallop.    No murmur heard.  Pulmonary/Chest: Effort normal. No respiratory distress. She has no wheezes. She has no rales.   Abdominal: Bowel sounds are normal. She exhibits no distension and no mass. There is no tenderness.   Musculoskeletal: She exhibits no edema or " tenderness.   Lymphadenopathy:     She has no cervical adenopathy.   Neurological: She is alert.   Psychiatric: She has a normal mood and affect.   Vitals reviewed.        Results:     Results from last visit:  Admission on 03/03/2018, Discharged on 03/08/2018   Component Date Value Ref Range Status     WBC 03/03/2018 11.3* 4.0 - 11.0 10e9/L Final     RBC Count 03/03/2018 3.89  3.8 - 5.2 10e12/L Final     Hemoglobin 03/03/2018 12.5  11.7 - 15.7 g/dL Final     Hematocrit 03/03/2018 38.3  35.0 - 47.0 % Final     MCV 03/03/2018 99  78 - 100 fl Final     MCH 03/03/2018 32.1  26.5 - 33.0 pg Final     MCHC 03/03/2018 32.6  31.5 - 36.5 g/dL Final     RDW 03/03/2018 13.0  10.0 - 15.0 % Final     Platelet Count 03/03/2018 315  150 - 450 10e9/L Final     Diff Method 03/03/2018 Automated Method   Final     % Neutrophils 03/03/2018 88.4  % Final     % Lymphocytes 03/03/2018 3.7  % Final     % Monocytes 03/03/2018 7.1  % Final     % Eosinophils 03/03/2018 0.2  % Final     % Basophils 03/03/2018 0.1  % Final     % Immature Granulocytes 03/03/2018 0.5  % Final     Nucleated RBCs 03/03/2018 0  0 /100 Final     Absolute Neutrophil 03/03/2018 9.9* 1.6 - 8.3 10e9/L Final     Absolute Lymphocytes 03/03/2018 0.4* 0.8 - 5.3 10e9/L Final     Absolute Monocytes 03/03/2018 0.8  0.0 - 1.3 10e9/L Final     Absolute Eosinophils 03/03/2018 0.0  0.0 - 0.7 10e9/L Final     Absolute Basophils 03/03/2018 0.0  0.0 - 0.2 10e9/L Final     Abs Immature Granulocytes 03/03/2018 0.1  0 - 0.4 10e9/L Final     Absolute Nucleated RBC 03/03/2018 0.0   Final     Sodium 03/03/2018 134  133 - 144 mmol/L Final     Potassium 03/03/2018 4.0  3.4 - 5.3 mmol/L Final     Chloride 03/03/2018 97  94 - 109 mmol/L Final     Carbon Dioxide 03/03/2018 25  20 - 32 mmol/L Final     Anion Gap 03/03/2018 11  3 - 14 mmol/L Final     Glucose 03/03/2018 116* 70 - 99 mg/dL Final     Urea Nitrogen 03/03/2018 33* 7 - 30 mg/dL Final     Creatinine 03/03/2018 0.83  0.52 - 1.04 mg/dL  Final     GFR Estimate 03/03/2018 64  >60 mL/min/1.7m2 Final    Non  GFR Calc     GFR Estimate If Black 03/03/2018 78  >60 mL/min/1.7m2 Final    African American GFR Calc     Calcium 03/03/2018 9.1  8.5 - 10.1 mg/dL Final     INR 03/03/2018 1.27* 0.86 - 1.14 Final     PTT 03/03/2018 30  22 - 37 sec Final     Color Urine 03/03/2018 Yellow   Final     Appearance Urine 03/03/2018 Clear   Final     Glucose Urine 03/03/2018 Negative  NEG^Negative mg/dL Final     Bilirubin Urine 03/03/2018 Negative  NEG^Negative Final     Ketones Urine 03/03/2018 40* NEG^Negative mg/dL Final     Specific Gravity Urine 03/03/2018 1.010  1.003 - 1.035 Final     Blood Urine 03/03/2018 Negative  NEG^Negative Final     pH Urine 03/03/2018 6.0  5.0 - 7.0 pH Final     Protein Albumin Urine 03/03/2018 10* NEG^Negative mg/dL Final     Urobilinogen mg/dL 03/03/2018 Normal  0.0 - 2.0 mg/dL Final     Nitrite Urine 03/03/2018 Negative  NEG^Negative Final     Leukocyte Esterase Urine 03/03/2018 Small* NEG^Negative Final     Source 03/03/2018 Clean catch urine   Final     WBC Urine 03/03/2018 5  0 - 5 /HPF Final     RBC Urine 03/03/2018 4* 0 - 2 /HPF Final     Squamous Epithelial /HPF Urine 03/03/2018 1  0 - 1 /HPF Final     Mucous Urine 03/03/2018 Present* NEG^Negative /LPF Final     Specimen Description 03/03/2018 Midstream Urine   Final     Special Requests 03/03/2018 Specimen received in preservative   Final     Culture Micro 03/03/2018    Final                    Value:10,000 to 50,000 colonies/mL  mixed urogenital jaswant  Susceptibility testing not routinely done       Radiologist flags 03/03/2018 Findings suspicious for right lower quadrant localized*  Final     Lactic Acid 03/04/2018 0.8  0.7 - 2.0 mmol/L Final     Sodium 03/04/2018 138  133 - 144 mmol/L Final     Potassium 03/04/2018 3.9  3.4 - 5.3 mmol/L Final     Chloride 03/04/2018 107  94 - 109 mmol/L Final     Carbon Dioxide 03/04/2018 20  20 - 32 mmol/L Final     Anion Gap  03/04/2018 12  3 - 14 mmol/L Final     WBC 03/04/2018 11.3* 4.0 - 11.0 10e9/L Final     RBC Count 03/04/2018 3.21* 3.8 - 5.2 10e12/L Final     Hemoglobin 03/04/2018 10.2* 11.7 - 15.7 g/dL Final     Hematocrit 03/04/2018 31.8* 35.0 - 47.0 % Final     MCV 03/04/2018 99  78 - 100 fl Final     MCH 03/04/2018 31.8  26.5 - 33.0 pg Final     MCHC 03/04/2018 32.1  31.5 - 36.5 g/dL Final     RDW 03/04/2018 13.0  10.0 - 15.0 % Final     Platelet Count 03/04/2018 275  150 - 450 10e9/L Final     INR 03/05/2018 1.30* 0.86 - 1.14 Final     WBC 03/05/2018 11.1* 4.0 - 11.0 10e9/L Final     RBC Count 03/05/2018 3.30* 3.8 - 5.2 10e12/L Final     Hemoglobin 03/05/2018 10.4* 11.7 - 15.7 g/dL Final     Hematocrit 03/05/2018 32.1* 35.0 - 47.0 % Final     MCV 03/05/2018 97  78 - 100 fl Final     MCH 03/05/2018 31.5  26.5 - 33.0 pg Final     MCHC 03/05/2018 32.4  31.5 - 36.5 g/dL Final     RDW 03/05/2018 13.1  10.0 - 15.0 % Final     Platelet Count 03/05/2018 290  150 - 450 10e9/L Final     Diff Method 03/05/2018 Automated Method   Final     % Neutrophils 03/05/2018 85.9  % Final     % Lymphocytes 03/05/2018 8.4  % Final     % Monocytes 03/05/2018 5.0  % Final     % Eosinophils 03/05/2018 0.3  % Final     % Basophils 03/05/2018 0.1  % Final     % Immature Granulocytes 03/05/2018 0.3  % Final     Nucleated RBCs 03/05/2018 0  0 /100 Final     Absolute Neutrophil 03/05/2018 9.6* 1.6 - 8.3 10e9/L Final     Absolute Lymphocytes 03/05/2018 0.9  0.8 - 5.3 10e9/L Final     Absolute Monocytes 03/05/2018 0.6  0.0 - 1.3 10e9/L Final     Absolute Eosinophils 03/05/2018 0.0  0.0 - 0.7 10e9/L Final     Absolute Basophils 03/05/2018 0.0  0.0 - 0.2 10e9/L Final     Abs Immature Granulocytes 03/05/2018 0.0  0 - 0.4 10e9/L Final     Absolute Nucleated RBC 03/05/2018 0.0   Final      03/05/2018 29  0 - 30 U/mL Final    Assay Method:  Chemiluminescence using Siemens Centaur XP     Cancer Antigen 19-9 03/05/2018 4  0 - 37 U/mL Final    Comment:  (Note)  INTERPRETIVE INFORMATION: Cancer Antigen-GI (CA 19-9)  This test uses Roche CA 19-9 electrochemiluminescent   immunoassay. Results obtained with different test methods   or kits cannot be used interchangeably. CA 19-9 value is   useful in monitoring pancreatic, hepatobiliary, gastric,   hepatocellular, and colorectal cancer. CA 19-9 value,   regardless of level, should not be interpreted as absolute   evidence of the presence or absence of malignant disease.  Performed by [email or web address],  [address] [place] [address] [phone]  [email or web address], Gerald Mojica MD, Lab. Director       CEA 03/05/2018 <0.5  0 - 2.5 ug/L Final    Assay Method:  Chemiluminescence using Siemens Centaur XP     Albumin 03/05/2018 1.8* 3.4 - 5.0 g/dL Final     Prealbumin 03/05/2018 4* 15 - 45 mg/dL Final     WBC 03/07/2018 6.8  4.0 - 11.0 10e9/L Final     RBC Count 03/07/2018 3.22* 3.8 - 5.2 10e12/L Final     Hemoglobin 03/07/2018 10.1* 11.7 - 15.7 g/dL Final     Hematocrit 03/07/2018 31.3* 35.0 - 47.0 % Final     MCV 03/07/2018 97  78 - 100 fl Final     MCH 03/07/2018 31.4  26.5 - 33.0 pg Final     MCHC 03/07/2018 32.3  31.5 - 36.5 g/dL Final     RDW 03/07/2018 13.2  10.0 - 15.0 % Final     Platelet Count 03/07/2018 238  150 - 450 10e9/L Final     Diff Method 03/07/2018 Automated Method   Final     % Neutrophils 03/07/2018 77.0  % Final     % Lymphocytes 03/07/2018 10.3  % Final     % Monocytes 03/07/2018 9.1  % Final     % Eosinophils 03/07/2018 2.4  % Final     % Basophils 03/07/2018 0.3  % Final     % Immature Granulocytes 03/07/2018 0.9  % Final     Nucleated RBCs 03/07/2018 0  0 /100 Final     Absolute Neutrophil 03/07/2018 5.2  1.6 - 8.3 10e9/L Final     Absolute Lymphocytes 03/07/2018 0.7* 0.8 - 5.3 10e9/L Final     Absolute Monocytes 03/07/2018 0.6  0.0 - 1.3 10e9/L Final     Absolute Eosinophils 03/07/2018 0.2  0.0 - 0.7 10e9/L Final     Absolute Basophils 03/07/2018 0.0  0.0 - 0.2 10e9/L Final     Abs Immature  "Granulocytes 03/07/2018 0.1  0 - 0.4 10e9/L Final     Absolute Nucleated RBC 03/07/2018 0.0   Final     INR 03/07/2018 1.24* 0.86 - 1.14 Final     Copath Report 03/07/2018    Final                    Value:Patient Name: MAREN BRADLEY  MR#: [id number]  Specimen #: [id number]  Collected: 3/7/2018  Received: 3/7/2018  Reported: 3/9/2018 00:32  Ordering Phy(s): CHEYENNE KAUR  Additional Phy(s): BO CARPIO    For improved result formatting, select 'View Enhanced Report Format' under   Linked Documents section.    SPECIMEN(S):  Pelvic mass biopsy    FINAL DIAGNOSIS:  PELVIC MASS, ULTRASOUND GUIDED CORE BIOPSY:  - Necroinflammatory debris  - Fibrovascular tissue and occasional degenerated skeletal muscle fibers,   with acute and chronic inflammation  - See comment    COMMENT:  If there is clinical concern for malignancy, additional tissue sampling is   recommended.    Correlation with the concurrent microbiology results is required.    The concurrent pelvic fluid cytology is pending (see report [id number]).    I have personally reviewed all specimens and/or slides, including the   listed special stains, and used them  with my medical judgement to determi                          ne or confirm the final diagnosis.    Electronically signed out by:    Ariana Ma M.D., [facility]    CLINICAL HISTORY:  The patient is a 89 year old woman with abdominal pain and weight loss.    Physical examination and imaging  studies show a right lower quadrant mass (12 cm multicystic mass, by   ultrasound).  Procedure: ultrasound  guided core biopsy and aspiration of right lower quadrant/pelvic mass.  GROSS:  The specimen is received in formalin with proper patient identification,   labeled \"pelvic mass biopsy\".  It  consists of multiple fragments of tan soft tissue ranging from 0.1-0.5 cm   in greatest dimension.  The specimen  is wrapped and entirely submitted in cassette A1. (Dictated by: " Becky VELAZCO ASCP 3/7/2018 03:04 PM)    MICROSCOPIC:  Microscopic examination is performed.    CPT Codes:  A: 80531-MO6    TESTING LAB LOCATION:  [facility], [address]   [address] [phone]                              COLLECTION SITE:  Client: [facility]  Location: [facility] (B)         Copath Report 03/07/2018    Final                    Value:Patient Name: MAREN BRADLEY  MR#: [id number]  Specimen #: [id number]  Collected: 3/7/2018  Received: 3/8/2018  Reported: 3/9/2018 12:26  Ordering Phy(s): CHEYENNE KAUR  Additional Phy(s): BO HODGES    For improved result formatting, select 'View Enhanced Report Format' under   Linked Documents section.    SPECIMEN/STAIN PROCESS:  Pelvic fluid       Pap-Cyto x 1, Gamble's stain-cyto x 1, Cell Block w/ H&E-Cyto x 1,   Cell Block, Level 2 x 1, Cell Block,  Level 3 x 1    ----------------------------------------------------------------    CYTOLOGIC INTERPRETATION:    Pelvic fluid:   Negative for malignancy.  - Acute inflammation present.  Please see comment.  Specimen Adequacy: Satisfactory for evaluation.    COMMENT:  The specimen contains abundant acute inflammation and areas of necrotic   debris consistent with abscess. There  is no definitive evidence of malignancy.    I have personally reviewed all specimens and/or slides, including the   listed special s                          tains, and used them  with my medical judgement to determine or confirm the final diagnosis.    Electronically signed out by:  Danette Shah M.D., [facility]    Processed and screened at [facility]    CLINICAL HISTORY:  The patient is a 89-year-old female with a complex solid/cystic right   pelvic mass.    ,    GROSS:  Pelvic fluid: Received 5 ml of red/black, thick, cloudy fluid,  processed   as 1 Pap stained AutoCyte, 1 Gamble  stained cytospin and one hematoxylin and eosin stained cell block.    MICROSCOPIC:  Microscopic examination performed.    Jude Blackwood MD, Cytopathology Fellow          Danette Shah MD    CPT Codes:  A: 93675-VICUZSP, 75129-HSGGFJI, 00554-KNP, HCB    TESTING LAB LOCATION:  [facility], [address]   [address] [phone]    COLLECTION SITE:  Client:  [facility]  Location:  [facility] (B)                              Resident  [id number]       Specimen Description 03/07/2018 Pelvic Fluid   Final     Culture Micro 03/07/2018 *  Final                    Value:Moderate growth  Escherichia coli       Culture Micro 03/07/2018 *  Final                    Value:Moderate growth  Streptococcus anginosus       Specimen Description 03/07/2018 Pelvic Fluid   Final     Gram Stain 03/07/2018 *  Final                    Value:Many  Gram negative rods       Gram Stain 03/07/2018 *  Final                    Value:Many  Gram positive cocci       Gram Stain 03/07/2018    Final                    Value:Many  WBC'S seen  predominantly PMN's       Specimen Description 03/07/2018 Pelvic Fluid   Final     Special Requests 03/07/2018 Received in anaerobic tubes.   Final     Culture Micro 03/07/2018 *  Final                    Value:Heavy growth  Bacteroides fragilis  Susceptibility testing not routinely done       Culture Micro 03/07/2018 *  Final                    Value:Heavy growth  Fusobacterium nucleatum  Susceptibility testing not routinely done         Assessment and Plan     Anca was seen today for weight check.    Diagnoses and all orders for this visit:    Loss of weight - weight has stabilized but would like to see her gain some back.  Encouraged congregate dining and increase in calories.  Care coordinator Tanja Quiroz to work with home care services to make sure all needs  met.  Recheck in 2 wks.    Frailty - function is stable.    Abdominal mass - resolved.      There are no discontinued medications.  Options for treatment and follow-up care were reviewed with the patient. Anca Alanis  engaged in the decision making process and verbalized understanding of the options discussed and agreed with the final plan.    Rafat Richards MD

## 2018-05-14 ENCOUNTER — DOCUMENTATION ONLY (OUTPATIENT)
Dept: CARE COORDINATION | Facility: CLINIC | Age: 83
End: 2018-05-14

## 2018-05-14 NOTE — PROGRESS NOTES
[facility] now requests orders and shares plan of care/discharge summaries for some patients through [facility].  Please REPLY TO THIS MESSAGE in order to give authorization for orders when needed.  This is considered a verbal order, you will still receive a faxed copy of orders for signature.  Thank you for your assistance in improving collaboration for our patients.    ORDER    OT to eval and treat C/O device use or need, with emphasis to homesafety.    Savanna Tabares, OTR/L  [phone]

## 2018-05-17 ENCOUNTER — DOCUMENTATION ONLY (OUTPATIENT)
Dept: CARE COORDINATION | Facility: CLINIC | Age: 83
End: 2018-05-17

## 2018-05-17 NOTE — PROGRESS NOTES
[facility] now requests orders and shares plan of care/discharge summaries for some patients through [email or web address].  Please REPLY TO THIS MESSAGE in order to give authorization for orders when needed.  This is considered a verbal order, you will still receive a faxed copy of orders for signature.  Thank you for your assistance in improving collaboration for our patients.    ORDER    OT 2w1, 1w1 for cog assessment, home safety and caregiver education.  The plan will also include falls prevention plan, monitor and treat pain, monitor skin integrity, continence management, monitor for s/s of depression, diabetic foot care, monitor for symptoms of heart failure and to achieve the following goals.  In 2 weeks,  1. Pt will participate in cognitive evaluation to assist in determining pts current level of safety and function with ADLs/IADLs    2. Pt/caregivers will be instructed in home safety/service recommendations based on cognitive testing.     Savanna Tabares, OTR/L  [phone]

## 2018-05-18 ENCOUNTER — TELEPHONE (OUTPATIENT)
Dept: FAMILY MEDICINE | Facility: CLINIC | Age: 83
End: 2018-05-18

## 2018-05-18 NOTE — TELEPHONE ENCOUNTER
Returned call from home care nurse.   She is wondering about dose of calcium citrate.  We have a self reported calcium carbonate with D and she is taking a half tab daily.  She reports the Anca's R ankle is swollen, there is no bruising, circulation is intact.  She doesn't report pain.  L ankle is not swollen.  She will check it the next visit and let us know if anything changes.    Hedy Bowie RN

## 2018-05-18 NOTE — TELEPHONE ENCOUNTER
[facility] phone call message-patient reporting a symptom:     Symptom: Right foot swelling     Same Day Visit Offered: Yes, declined    Additional comments: Sharonda also wanted to discuss the patient's medication list as well.    OK to leave message on voice mail? Yes    Primary language: English      needed? No    Call taken on May 18, 2018 at 1:18 PM by Vanessa Soliz

## 2018-05-22 ENCOUNTER — DOCUMENTATION ONLY (OUTPATIENT)
Dept: FAMILY MEDICINE | Facility: CLINIC | Age: 83
End: 2018-05-22

## 2018-05-22 NOTE — PROGRESS NOTES
"When opening a documentation only encounter, be sure to enter in \"Chief Complaint\" Forms and in \" Comments\" Title of form, description if needed.    Anca is a 89 year old  female  Form received via: Fax  Form now resides in: Provider Ready    Tala Beaver CMA                Form has been completed by provider.     Form sent out via: Fax to [phone]  Patient informed: No, Reason:Fax confirmation  Output date: May 25, 2018    Tala Beaver CMA      **Please close the encounter**      "

## 2018-05-23 ENCOUNTER — OFFICE VISIT (OUTPATIENT)
Dept: FAMILY MEDICINE | Facility: CLINIC | Age: 83
End: 2018-05-23
Payer: MEDICARE

## 2018-05-23 VITALS
SYSTOLIC BLOOD PRESSURE: 124 MMHG | DIASTOLIC BLOOD PRESSURE: 71 MMHG | HEART RATE: 76 BPM | TEMPERATURE: 97.8 F | WEIGHT: 97.2 LBS | OXYGEN SATURATION: 96 % | BODY MASS INDEX: 16.68 KG/M2

## 2018-05-23 DIAGNOSIS — R63.4 LOSS OF WEIGHT: ICD-10-CM

## 2018-05-23 DIAGNOSIS — F02.80 LATE ONSET ALZHEIMER'S DISEASE WITHOUT BEHAVIORAL DISTURBANCE (H): Primary | ICD-10-CM

## 2018-05-23 DIAGNOSIS — G30.1 LATE ONSET ALZHEIMER'S DISEASE WITHOUT BEHAVIORAL DISTURBANCE (H): Primary | ICD-10-CM

## 2018-05-23 ASSESSMENT — ENCOUNTER SYMPTOMS
FATIGUE: 0
WEAKNESS: 1
ACTIVITY CHANGE: 0
SHORTNESS OF BREATH: 0
NAUSEA: 0
DIARRHEA: 0
DYSPHORIC MOOD: 0
NERVOUS/ANXIOUS: 0
COUGH: 0
MYALGIAS: 0
UNEXPECTED WEIGHT CHANGE: 0
FEVER: 0
CONFUSION: 1
CHOKING: 0
ABDOMINAL PAIN: 0

## 2018-05-23 NOTE — MR AVS SNAPSHOT
After Visit Summary   5/23/2018    Anca Alanis    MRN: [id number]           Patient Information     Date Of Birth          5/2/1924        Visit Information        Provider Department      5/23/2018 3:40 PM Rafat Richards MD [facility]        Today's Diagnoses     Late onset Alzheimer's disease without behavioral disturbance    -  1    Loss of weight          Care Instructions    Try to be as socially engaged as possible.    Maintain exercise and physical activity.    Every calorie is a good calorie.          Follow-ups after your visit        Follow-up notes from your care team     Return in about 3 weeks (around 6/13/2018).      Who to contact     Please call your clinic at [phone] to:    Ask questions about your health    Make or cancel appointments    Discuss your medicines    Learn about your test results    Speak to your doctor            Additional Information About Your Visit        Care EveryWhere ID     This is your Care EveryWhere ID. This could be used by other organizations to access your [place] medical records  [id number]        Your Vitals Were     Pulse Temperature Pulse Oximetry Breastfeeding? BMI (Body Mass Index)       76 97.8  F (36.6  C) (Oral) 96% No 16.68 kg/m2        Blood Pressure from Last 3 Encounters:   05/23/18 124/71   05/09/18 138/78   04/25/18 118/65    Weight from Last 3 Encounters:   05/23/18 97 lb 3.2 oz (44.1 kg)   05/09/18 97 lb 9.6 oz (44.3 kg)   04/25/18 97 lb 12.8 oz (44.4 kg)              Today, you had the following     No orders found for display         Today's Medication Changes          These changes are accurate as of 5/23/18  4:00 PM.  If you have any questions, ask your nurse or doctor.               These medicines have changed or have updated prescriptions.        Dose/Directions    alendronate 70 MG tablet   Commonly known as:  FOSAMAX   This may have changed:  additional instructions   Used for:  Osteoporosis         Dose:  70 mg   Take 1 tablet (70 mg) by mouth every 7 days   Quantity:  12 tablet   Refills:  3                Primary Care Provider Office Phone # Fax #    Raaft Richards [phone]       [address] [address]        Equal Access to Services     [facility] [place] : Hadtom mark anthony preciado anne marieo Soomaali, waaxda luqadaha, qaybta kaalmada adeegyada, waxramonita montana tamikadre renee richardsonnataile velázquez. So [facility] [phone].    ATENCIÓN: Si habla español, tiene a gillette disposición servicios gratuitos de asistencia lingüística. Llame al [phone].    We comply with applicable federal civil rights laws and [place] laws. We do not discriminate on the basis of race, color, national origin, age, disability, sex, sexual orientation, or gender identity.            Thank you!     Thank you for choosing [facility]  for your care. Our goal is always to provide you with excellent care. Hearing back from our patients is one way we can continue to improve our services. Please take a few minutes to complete the written survey that you may receive in the mail after your visit with us. Thank you!             Your Updated Medication List - Protect others around you: Learn how to safely use, store and throw away your medicines at www.disposemymeds.org.          This list is accurate as of 5/23/18  4:00 PM.  Always use your most recent med list.                   Brand Name Dispense Instructions for use Diagnosis    acetaminophen 325 MG tablet    TYLENOL    100 tablet    Take 2 tablets (650 mg) by mouth At Bedtime    Abdominal mass, RLQ (right lower quadrant)       alendronate 70 MG tablet    FOSAMAX    12 tablet    Take 1 tablet (70 mg) by mouth every 7 days    Osteoporosis       CALCIUM + D PO      315 mg-200 IU tablet. Take 0.5 tablet by mouth daily with meals.        donepezil 5 MG tablet    ARIcept    90 tablet    Take 1 tablet (5 mg) by mouth At Bedtime for memory.    Late onset Alzheimer's  disease without behavioral disturbance       melatonin 3 MG tablet     90 tablet    Take 1 tablet (3 mg) by mouth At Bedtime    Sleep disorder       memantine 5 MG tablet    NAMENDA    180 tablet    Take 1 tablet (5 mg) by mouth 2 times daily for memory.    Late onset Alzheimer's disease without behavioral disturbance       MULTIVITAMIN PO      Take 0.5 tablets by mouth daily        zolpidem 5 MG tablet    AMBIEN     Take 5 mg by mouth

## 2018-05-23 NOTE — PROGRESS NOTES
HPI:       89 year old patient who presents with:    Recheck weight.  Has been at home for 4 weeks now.  Feels well.  Reports no problems.  Has been going to congregate dining a little.  Energy level is low but she is still engaging in exercise.   reports no problems.    No recurrence of abdominal pain.  Appetite is good.  No bowel problems.       Problem, Medication and Allergy Lists were reviewed and are current.  Patient is an established patient of this clinic.         Review of Systems:     Review of Systems   Constitutional: Negative for activity change, fatigue, fever and unexpected weight change.   Respiratory: Negative for cough, choking and shortness of breath.    Cardiovascular: Negative for chest pain.   Gastrointestinal: Negative for abdominal pain, diarrhea and nausea.   Musculoskeletal: Negative for gait problem and myalgias.   Neurological: Positive for weakness.   Psychiatric/Behavioral: Positive for confusion. Negative for behavioral problems and dysphoric mood. The patient is not nervous/anxious.           Physical Exam:     /71  Pulse 76  Temp 97.8  F (36.6  C) (Oral)  Wt 97 lb 3.2 oz (44.1 kg)  SpO2 96%  Breastfeeding? No  BMI 16.68 kg/m2    Body mass index is 16.68 kg/(m^2).  Vitals reviewed.    Physical Exam   Constitutional: She appears well-developed. No distress.   Thin.     Neck: No thyromegaly present.   Cardiovascular: Normal rate, regular rhythm and normal heart sounds.  Exam reveals no gallop.    No murmur heard.  Pulmonary/Chest: Effort normal. No respiratory distress. She has no wheezes. She has no rales.   Musculoskeletal: She exhibits no edema or tenderness.   Lymphadenopathy:     She has no cervical adenopathy.   Neurological: She is alert.   Psychiatric: She has a normal mood and affect.   Marked memory deficits, bright affect   Vitals reviewed.          Results:       Assessment and Plan     Anca was seen today for weight check.    Diagnoses and all  orders for this visit:    Late onset Alzheimer's disease without behavioral disturbance - doing well at home with services.  Encouraged social engagement and exercise.    Loss of weight - she has stabilized at 97 lbs.  Encouraged increased caloric intake.  Recheck 1 month.        Options for treatment and follow-up care were reviewed with the patient. Anca Alanis engaged in the decision making process and verbalized understanding of the options discussed and agreed with the final plan.    Rafat Richards MD

## 2018-05-23 NOTE — PATIENT INSTRUCTIONS
Try to be as socially engaged as possible.    Maintain exercise and physical activity.    Every calorie is a good calorie.

## 2018-05-25 ENCOUNTER — DOCUMENTATION ONLY (OUTPATIENT)
Dept: FAMILY MEDICINE | Facility: CLINIC | Age: 83
End: 2018-05-25

## 2018-07-09 DIAGNOSIS — M81.0 OSTEOPOROSIS: ICD-10-CM

## 2018-07-09 DIAGNOSIS — M81.0 OSTEOPOROSIS, UNSPECIFIED OSTEOPOROSIS TYPE, UNSPECIFIED PATHOLOGICAL FRACTURE PRESENCE: Primary | ICD-10-CM

## 2018-07-09 NOTE — TELEPHONE ENCOUNTER

## 2018-07-10 RX ORDER — ALENDRONATE SODIUM 70 MG/1
70 TABLET ORAL
Qty: 13 TABLET | Refills: 3 | Status: SHIPPED | OUTPATIENT
Start: 2018-07-10 | End: 2019-08-09

## 2018-08-27 ENCOUNTER — TELEPHONE (OUTPATIENT)
Dept: FAMILY MEDICINE | Facility: CLINIC | Age: 83
End: 2018-08-27

## 2018-08-27 NOTE — TELEPHONE ENCOUNTER
[facility] phone call message- general phone call:    Reason for call: Home care nurse Aida Baeza called to report she saw the patient today for home care and medication set up but the patient has a bottle of namenda and aricept and it was clear to the nurse the patient did not know what she should be taking.  Nurse is asking for an update medication list to be sent to her at     Action Desired: please fax current med list to Aida at [employer]    Return call needed: Yes    OK to leave a message on voice mail? Yes    Advised patient response may take up to 2 business days: Yes    Primary language: English      needed? No    Call taken on August 27, 2018 at 4:55 PM by Marlen Lowe    [facility] to [facility] TRIAGE

## 2018-09-19 NOTE — TELEPHONE ENCOUNTER
FUTURE VISIT INFORMATION      FUTURE VISIT INFORMATION:    Date: 9/24    Time: 9:00    Location:   REFERRAL INFORMATION:    Referring provider:  SELF    Referring providers clinic:      Reason for visit/diagnosis: Rt big toenail infected         RECORDS STATUS      NO OUTSIDE RECORDS

## 2018-09-20 ENCOUNTER — DOCUMENTATION ONLY (OUTPATIENT)
Dept: FAMILY MEDICINE | Facility: CLINIC | Age: 83
End: 2018-09-20

## 2018-09-20 NOTE — PROGRESS NOTES
Form has been completed by provider.     Form sent out via: Fax to [phone]  Patient informed: No, Reason:fax confirmed  Output date: September 20, 2018    Tala Beaver CMA      **Please close the encounter**

## 2018-09-24 ENCOUNTER — OFFICE VISIT (OUTPATIENT)
Dept: ORTHOPEDICS | Facility: CLINIC | Age: 83
End: 2018-09-24
Payer: MEDICARE

## 2018-09-24 ENCOUNTER — PRE VISIT (OUTPATIENT)
Dept: ORTHOPEDICS | Facility: CLINIC | Age: 83
End: 2018-09-24

## 2018-09-24 DIAGNOSIS — L60.2 LONG TOENAIL: ICD-10-CM

## 2018-09-24 DIAGNOSIS — B35.1 ONYCHOMYCOSIS: ICD-10-CM

## 2018-09-24 DIAGNOSIS — I73.9 PVD (PERIPHERAL VASCULAR DISEASE) (H): Primary | ICD-10-CM

## 2018-09-24 NOTE — LETTER
9/24/2018      RE: Anca Alanis  [address]  [place] [address]        Subjective:   Anca Alanis is a 89 year old female who is c/o right big toe nail infection. She relates that this has been like this for a few months. She presents with her  today. They relate that the nail is not painful for her. She has not tried any other treatments for the nail.          PAST MEDICAL, SOCIAL, SURGICAL AND FAMILY HISTORY: She  has a past medical history of Cataracts, both eyes and Glaucoma suspect.  She  has no past surgical history on file.  Her family history includes Diabetes in her paternal grandmother and son; Hypertension in her sister.  She reports that she has never smoked. She has never used smokeless tobacco. She reports that she does not drink alcohol or use illicit drugs.    ALLERGIES: She is allergic to ciprofloxacin and sulfa drugs.    CURRENT MEDICATIONS: She has a current medication list which includes the following prescription(s): acetaminophen, alendronate, calcium citrate-vitamin d, donepezil, melatonin, memantine, multiple vitamins-minerals, and zolpidem.     REVIEW OF SYSTEMS: 9 point review of systems is negative except as noted above.     Exam:   There were no vitals taken for this visit.           CONSTITUTIONAL: healthy, alert and no distress  HEAD: Normocephalic. No masses, lesions, tenderness or abnormalities  SKIN: Nails on the right 1st BL and right 5th digits are thickened, yellowed, dystrophic, with subungual debris consistent with onychomycosis. Vesicular rash in a moccasin distribution noted to BL feet consistent with tinea pedis. Xerosis to BL legs.   GAIT: normal  NEUROLOGIC: Non-focal  PSYCHIATRIC: affect normal/bright and forgetful 2/2 late onset Alzheimer's.     MUSCULOSKELETAL: Rectus foot type. Hammertoes BL.   DP pulses are 2/4. Non-palpable PT pulses with BL varicosities and telangectasia.        Assessment/Plan:   BL tinea pedis and  onychomycosis without pain.  - Mild PVD.     - Pt seen and evaluated.  - Nails debrided x 3 and trimmed x 7.    - Because she is not symptomatic, I would recommend that the nails just be trimmed back. Topicals will have little effect on the right 5th digit, and this will act as a continual nidus to reinfect other nails and skin. She and her  agree to this, however they are wondering if there is a home service that can debride her nails. I have referred them to [facility].   - Lotion to legs nightly.   - See again PRN.         Yao Jewell, ADENM

## 2018-09-24 NOTE — PROGRESS NOTES
Subjective:   Anca Alanis is a 89 year old female who is c/o right big toe nail infection. She relates that this has been like this for a few months. She presents with her  today. They relate that the nail is not painful for her. She has not tried any other treatments for the nail.          PAST MEDICAL, SOCIAL, SURGICAL AND FAMILY HISTORY: She  has a past medical history of Cataracts, both eyes and Glaucoma suspect.  She  has no past surgical history on file.  Her family history includes Diabetes in her paternal grandmother and son; Hypertension in her sister.  She reports that she has never smoked. She has never used smokeless tobacco. She reports that she does not drink alcohol or use illicit drugs.    ALLERGIES: She is allergic to ciprofloxacin and sulfa drugs.    CURRENT MEDICATIONS: She has a current medication list which includes the following prescription(s): acetaminophen, alendronate, calcium citrate-vitamin d, donepezil, melatonin, memantine, multiple vitamins-minerals, and zolpidem.     REVIEW OF SYSTEMS: 9 point review of systems is negative except as noted above.     Exam:   There were no vitals taken for this visit.           CONSTITUTIONAL: healthy, alert and no distress  HEAD: Normocephalic. No masses, lesions, tenderness or abnormalities  SKIN: Nails on the right 1st BL and right 5th digits are thickened, yellowed, dystrophic, with subungual debris consistent with onychomycosis. Vesicular rash in a moccasin distribution noted to BL feet consistent with tinea pedis. Xerosis to BL legs.   GAIT: normal  NEUROLOGIC: Non-focal  PSYCHIATRIC: affect normal/bright and forgetful 2/2 late onset Alzheimer's.     MUSCULOSKELETAL: Rectus foot type. Hammertoes BL.   DP pulses are 2/4. Non-palpable PT pulses with BL varicosities and telangectasia.        Assessment/Plan:   BL tinea pedis and onychomycosis without pain.  - Mild PVD.     - Pt seen and evaluated.  - Nails debrided x 3 and trimmed x 7.     - Because she is not symptomatic, I would recommend that the nails just be trimmed back. Topicals will have little effect on the right 5th digit, and this will act as a continual nidus to reinfect other nails and skin. She and her  agree to this, however they are wondering if there is a home service that can debride her nails. I have referred them to [facility].   - Lotion to legs nightly.   - See again PRN.

## 2018-09-24 NOTE — MR AVS SNAPSHOT
After Visit Summary   2018    Anca Alanis    MRN: [id number]           Patient Information     Date Of Birth          1924        Visit Information        Provider Department      2018 9:00 AM Yao Jewell DPM [facility]        Today's Diagnoses     PVD (peripheral vascular disease) (H)    -  1    Onychomycosis        Long toenail           Follow-ups after your visit        Who to contact     Please call your clinic at [phone] to:    Ask questions about your health    Make or cancel appointments    Discuss your medicines    Learn about your test results    Speak to your doctor            Additional Information About Your Visit        MyChart Information     [email or web address] is an electronic gateway that provides easy, online access to your medical records. With [employer], you can request a clinic appointment, read your test results, renew a prescription or communicate with your care team.     To sign up for [email or web address] visit the website at [email or web address]   You will be asked to enter the access code listed below, as well as some personal information. Please follow the directions to create your username and password.     Your access code is: [email or web address]  Expires: 2018  6:30 AM     Your access code will  in 90 days. If you need help or a new code, please contact your [facility] or call [phone] for assistance.        Care EveryWhere ID     This is your Care EveryWhere ID. This could be used by other organizations to access your [place] medical records  [id number]         Blood Pressure from Last 3 Encounters:   18 124/71   18 138/78   18 118/65    Weight from Last 3 Encounters:   18 97 lb 3.2 oz (44.1 kg)   18 97 lb 9.6 oz (44.3 kg)   18 97 lb 12.8 oz (44.4 kg)              We Performed the Following     DEBRIDEMENT OF NAIL(S), 1-5     TRIM NONDYSTRPHIC NAIL(S)         Primary Care Provider Office Phone # Fax #    Rafat Richards [phone]       [address] [address]        Equal Access to Services     CUCO LINTON : Hadii aad ku hadmagdaleno Diazali, clovis raqueljarad, tatyta kanirmal richardson, kiana harrington shellylaura garcia ladurandre velázquez. So [facility] [phone].    ATENCIÓN: Si habla español, tiene a gillette disposición servicios gratuitos de asistencia lingüística. Llame al [phone].    We comply with applicable federal civil rights laws and [place] laws. We do not discriminate on the basis of race, color, national origin, age, disability, sex, sexual orientation, or gender identity.            Thank you!     Thank you for choosing [facility]  for your care. Our goal is always to provide you with excellent care. Hearing back from our patients is one way we can continue to improve our services. Please take a few minutes to complete the written survey that you may receive in the mail after your visit with us. Thank you!             Your Updated Medication List - Protect others around you: Learn how to safely use, store and throw away your medicines at www.disposemymeds.org.          This list is accurate as of 9/24/18  9:38 AM.  Always use your most recent med list.                   Brand Name Dispense Instructions for use Diagnosis    acetaminophen 325 MG tablet    TYLENOL    100 tablet    Take 2 tablets (650 mg) by mouth At Bedtime    Abdominal mass, RLQ (right lower quadrant)       alendronate 70 MG tablet    FOSAMAX    13 tablet    Take 1 tablet (70 mg) by mouth every 7 days On Thursdays.    Osteoporosis, unspecified osteoporosis type, unspecified pathological fracture presence       CALCIUM + D PO      315 mg-200 IU tablet. Take 0.5 tablet by mouth daily with meals.        donepezil 5 MG tablet    ARIcept    90 tablet    Take 1 tablet (5 mg) by mouth At Bedtime for memory.    Late onset Alzheimer's disease without behavioral  disturbance       melatonin 3 MG tablet     90 tablet    Take 1 tablet (3 mg) by mouth At Bedtime    Sleep disorder       memantine 5 MG tablet    NAMENDA    180 tablet    Take 1 tablet (5 mg) by mouth 2 times daily for memory.    Late onset Alzheimer's disease without behavioral disturbance       MULTIVITAMIN PO      Take 0.5 tablets by mouth daily        zolpidem 5 MG tablet    AMBIEN     Take 5 mg by mouth

## 2018-10-09 ENCOUNTER — TELEPHONE (OUTPATIENT)
Dept: ORTHOPEDICS | Facility: CLINIC | Age: 83
End: 2018-10-09

## 2018-10-09 NOTE — TELEPHONE ENCOUNTER
Health Call Center    Phone Message    May a detailed message be left on voicemail: yes    Reason for Call: Other: Aida - nurse from [employer] called stating that the patient lost list of providers to do foot care which she was given at her last appointment. Can you please call the nurse to discuss this? [phone].     Action Taken: Message routed to:  [facility]. Kathleen M Doege RN

## 2018-10-09 NOTE — TELEPHONE ENCOUNTER
"Called DARON Parra back who asked which home service company Dr. Jewell recommended for patient to have nails trimmed, she was told \"[facility]\" per Kle[facility] note. Aida said she reached out to them and they are not taking new clients but she will keep trying. She asked to make a follow up appt with Fanta in case [employer] [facility] is unable to see her. Appt made.   "

## 2018-11-06 ENCOUNTER — OFFICE VISIT (OUTPATIENT)
Dept: ORTHOPEDICS | Facility: CLINIC | Age: 83
End: 2018-11-06
Payer: MEDICARE

## 2018-11-06 DIAGNOSIS — Z53.9 ERRONEOUS ENCOUNTER--DISREGARD: Primary | ICD-10-CM

## 2018-11-06 NOTE — PROGRESS NOTES
Pt presents today with her . I discussed with her that she is too soon for insurance coverage. Insurance will pay around 12/3/18. They will call to reappoint. No exam or treatment performed today.

## 2018-11-06 NOTE — LETTER
11/6/2018       RE: Anca Alanis  [address] [place] [address]     Dear Colleague,    Thank you for referring your patient, Anca Alanis, to the [facility]. Please see a copy of my visit note below.    Pt presents today with her . I discussed with her that she is too soon for insurance coverage. Insurance will pay around 12/3/18. They will call to reappoint. No exam or treatment performed today.        Sincerely,    Yao Jewell DPM

## 2018-11-06 NOTE — MR AVS SNAPSHOT
After Visit Summary   2018    Anca Alanis    MRN: [id number]           Patient Information     Date Of Birth          1924        Visit Information        Provider Department      2018 1:20 PM Yao Jewell, [facility]        Today's Diagnoses     ERRONEOUS ENCOUNTER--DISREGARD    -  1       Follow-ups after your visit        Who to contact     Please call your clinic at [phone] to:    Ask questions about your health    Make or cancel appointments    Discuss your medicines    Learn about your test results    Speak to your doctor            Additional Information About Your Visit        MyChart Information     [employer] is an electronic gateway that provides easy, online access to your medical records. With [employer], you can request a clinic appointment, read your test results, renew a prescription or communicate with your care team.     To sign up for [employer] visit the website at [email or web address]   You will be asked to enter the access code listed below, as well as some personal information. Please follow the directions to create your username and password.     Your access code is: [email or web address]  Expires: 2018  5:30 AM     Your access code will  in 90 days. If you need help or a new code, please contact your [facility] or call [phone] for assistance.        Care EveryWhere ID     This is your Care EveryWhere ID. This could be used by other organizations to access your [place] medical records  [id number]         Blood Pressure from Last 3 Encounters:   18 124/71   18 138/78   18 118/65    Weight from Last 3 Encounters:   18 44.1 kg (97 lb 3.2 oz)   18 44.3 kg (97 lb 9.6 oz)   18 44.4 kg (97 lb 12.8 oz)              Today, you had the following     No orders found for display       Primary Care Provider Office Phone # Fax #    Rafat Richards [phone]  [phone]       [address] [address]        Equal Access to Services     SHANELLDEE DEE ROYER : Hadii mark anthony preciado carlitos Shields, wayovanida raqueljaneha, qamarisata barrera shellyamelie, waxramonita nan tamikadre bravolaura garcia loretta velázquez. So [facility] [phone].    ATENCIÓN: Si habla español, tiene a gillette disposición servicios gratuitos de asistencia lingüística. Christine al [phone].    We comply with applicable federal civil rights laws and [place] laws. We do not discriminate on the basis of race, color, national origin, age, disability, sex, sexual orientation, or gender identity.            Thank you!     Thank you for choosing [facility]  for your care. Our goal is always to provide you with excellent care. Hearing back from our patients is one way we can continue to improve our services. Please take a few minutes to complete the written survey that you may receive in the mail after your visit with us. Thank you!             Your Updated Medication List - Protect others around you: Learn how to safely use, store and throw away your medicines at www.disposemymeds.org.          This list is accurate as of 11/6/18  3:49 PM.  Always use your most recent med list.                   Brand Name Dispense Instructions for use Diagnosis    acetaminophen 325 MG tablet    TYLENOL    100 tablet    Take 2 tablets (650 mg) by mouth At Bedtime    Abdominal mass, RLQ (right lower quadrant)       alendronate 70 MG tablet    FOSAMAX    13 tablet    Take 1 tablet (70 mg) by mouth every 7 days On Thursdays.    Osteoporosis, unspecified osteoporosis type, unspecified pathological fracture presence       CALCIUM + D PO      315 mg-200 IU tablet. Take 0.5 tablet by mouth daily with meals.        donepezil 5 MG tablet    ARIcept    90 tablet    Take 1 tablet (5 mg) by mouth At Bedtime for memory.    Late onset Alzheimer's disease without behavioral disturbance       melatonin 3 MG tablet     90 tablet    Take 1 tablet (3 mg)  by mouth At Bedtime    Sleep disorder       memantine 5 MG tablet    NAMENDA    180 tablet    Take 1 tablet (5 mg) by mouth 2 times daily for memory.    Late onset Alzheimer's disease without behavioral disturbance       MULTIVITAMIN PO      Take 0.5 tablets by mouth daily        zolpidem 5 MG tablet    AMBIEN     Take 5 mg by mouth

## 2018-11-06 NOTE — NURSING NOTE
Reason For Visit:   Chief Complaint   Patient presents with     RECHECK     Toe nail trimming/cutting.        Pain Assessment  Patient Currently in Pain: Denies          Current Outpatient Prescriptions   Medication Sig Dispense Refill     acetaminophen (TYLENOL) 325 MG tablet Take 2 tablets (650 mg) by mouth At Bedtime 100 tablet      alendronate (FOSAMAX) 70 MG tablet Take 1 tablet (70 mg) by mouth every 7 days On Thursdays. 13 tablet 3     Calcium Carbonate-Vitamin D (CALCIUM + D PO) 315 mg-200 IU tablet. Take 0.5 tablet by mouth daily with meals.       donepezil (ARICEPT) 5 MG tablet Take 1 tablet (5 mg) by mouth At Bedtime for memory. 90 tablet 1     melatonin 3 MG tablet Take 1 tablet (3 mg) by mouth At Bedtime 90 tablet 3     memantine (NAMENDA) 5 MG tablet Take 1 tablet (5 mg) by mouth 2 times daily for memory. 180 tablet 1     Multiple Vitamin (MULTIVITAMIN OR) Take 0.5 tablets by mouth daily        zolpidem (AMBIEN) 5 MG tablet Take 5 mg by mouth            Allergies   Allergen Reactions     Ciprofloxacin      Sulfa Drugs

## 2018-11-15 ENCOUNTER — DOCUMENTATION ONLY (OUTPATIENT)
Dept: FAMILY MEDICINE | Facility: CLINIC | Age: 83
End: 2018-11-15

## 2018-11-15 NOTE — PROGRESS NOTES
Form has been completed by provider.     Form sent out via: Fax to [phone]  Patient informed: No, Reason:fax confirmed  Output date: November 15, 2018    Tala Pereyra CMA      **Please close the encounter**

## 2018-12-14 ENCOUNTER — DOCUMENTATION ONLY (OUTPATIENT)
Dept: FAMILY MEDICINE | Facility: CLINIC | Age: 83
End: 2018-12-14

## 2018-12-14 NOTE — PROGRESS NOTES
"When opening a documentation only encounter, be sure to enter in \"Chief Complaint\" Forms and in \" Comments\" Title of form, description if needed.    Anca is a 89 year old  female  Form received via: Fax  Form now resides in: Provider Ready    Tala Pereyra CMA              Form has been completed by provider.     Form sent out via: Fax to [phone]  Patient informed: No, Reason:fax confirmed  Output date: December 14, 2018    Tala Pereyra CMA      **Please close the encounter**        "

## 2019-01-28 ENCOUNTER — OFFICE VISIT (OUTPATIENT)
Dept: DERMATOLOGY | Facility: CLINIC | Age: 84
End: 2019-01-28
Payer: MEDICARE

## 2019-01-28 DIAGNOSIS — L85.3 XEROSIS OF SKIN: ICD-10-CM

## 2019-01-28 DIAGNOSIS — L57.0 ACTINIC KERATOSIS: Primary | ICD-10-CM

## 2019-01-28 NOTE — PROGRESS NOTES
[facility] Dermatology Note      Dermatology Problem List:  1. actinic keratoses of the face (forehead and left cheek)  -deferring treatment at this time given superimposed overlying dry skin and negative side effects of applying 5-fluorouracil to such a large are of skin   2. Seborrheic keratoses, various on neck and back   3. Xerosis cutis  Current tx: daily emollient of choice         Encounter Date: Jan 28, 2019    CC:  Chief Complaint   Patient presents with     Derm Problem     Anca is here for rash on back         History of Present Illness:  Ms. Anca Alanis is a 89 year old female who presents for self referral for a rash on her back. Notes that her  saw a spot on her back that he was concerned about. Says that it has become more prominent over these past three months. Doesn't bother patient and is not itchy. She denies putting anything over the skin area to manage it.     Past Medical History:   Patient Active Problem List   Diagnosis     Anxiety state     Backache     Dysthymic disorder     Hearing loss     Spinal stenosis, lumbar region, without neurogenic claudication     Osteoarthritis of multiple joints     Osteoporosis     Scoliosis (and kyphoscoliosis), idiopathic     Health Care Home     Sleep disorder     Moderate stage glaucoma - Left Eye     Senile cataracts of both eyes     Late onset Alzheimer's disease without behavioral disturbance     Abdominal pain     Past Medical History:   Diagnosis Date     Cataracts, both eyes      Glaucoma suspect      No past surgical history on file.    Social History:  Patient reports that  has never smoked. she has never used smokeless tobacco. She reports that she does not drink alcohol or use drugs.    Family History:  Family History   Problem Relation Age of Onset     Hypertension Sister      Diabetes Paternal Grandmother      Diabetes Son        Medications:  Current Outpatient Medications   Medication Sig Dispense Refill      acetaminophen (TYLENOL) 325 MG tablet Take 2 tablets (650 mg) by mouth At Bedtime 100 tablet      alendronate (FOSAMAX) 70 MG tablet Take 1 tablet (70 mg) by mouth every 7 days On Thursdays. 13 tablet 3     Calcium Carbonate-Vitamin D (CALCIUM + D PO) 315 mg-200 IU tablet. Take 0.5 tablet by mouth daily with meals.       donepezil (ARICEPT) 5 MG tablet Take 1 tablet (5 mg) by mouth At Bedtime for memory. 90 tablet 1     melatonin 3 MG tablet Take 1 tablet (3 mg) by mouth At Bedtime 90 tablet 3     memantine (NAMENDA) 5 MG tablet Take 1 tablet (5 mg) by mouth 2 times daily for memory. 180 tablet 1     Multiple Vitamin (MULTIVITAMIN OR) Take 0.5 tablets by mouth daily        zolpidem (AMBIEN) 5 MG tablet Take 5 mg by mouth       Allergies   Allergen Reactions     Ciprofloxacin      Sulfa Drugs          Review of Systems:  -As per HPI  -Constitutional: Otherwise feeling well today, in usual state of health.  -Skin: As above in HPI. No additional skin concerns.    Physical exam:  Vitals: There were no vitals taken for this visit.  GEN: This is a well developed, well-nourished female in no acute distress, in a pleasant mood.    SKIN: Focused examination of the back, chest, neck, and face was performed.  -various tan, waxy plaques with a stuck on appearance on her back and neck  -dry skin on arms and forehead  -rough slightly erythematous pink macule on forehead and left cheek  -No other lesions of concern on areas examined.     Impression/Plan:  1. Actinic keratosis with xerosis cutis    Patient has diffusely dry skin on the face and arms and it is difficult to assess AK vs xerosis cutis. Some areas do show AK but discussed with patient low concern for these regions and relatively negative side effects of treating with 5-fluorouracil to such a large are of skin    Recommended daily moisturizer to manage dry skin      2. Seborrheic keratoses, various    Reassured patient and  that the regions on her back and  neck that they were concerned about are benign and part of the aging process        CC Referred Self, MD  No address on file on close of this encounter.  Follow-up in 1 year for annual skin check.     Staff Involved:  I, Bharathi Rosales, MS4, saw and examined the patient in the presence of Dr. Bean.  Staff Physician Comments:  I was present with the medical student who participated in the service and in the documentation of the note. I have verified the history and personally performed the physical exam and medical decision making. I agree with the assessment and plan as documented in the note. I have reviewed and if necessary amended the note.      Shivam Bean MD  Professor  Head of Dermato-Allergy Division  Department of Dermatology  [facility]

## 2019-01-28 NOTE — LETTER
1/28/2019       RE: Anca Alanis  [address]  [place] [address]     Dear Colleague,    Thank you for referring your patient, Anca Alanis, to the [facility] DERMATOLOGY at [facility]. Please see a copy of my visit note below.    [facility] Dermatology Note      Dermatology Problem List:  1.  actinic keratoses of the face (forehead and left cheek)  -deferring treatment at this time given superimposed overlying dry skin and negative side effects of applying 5-fluorouracil to such a large are of skin   2. Seborrheic keratoses, various on neck and back   3. Xerosis cutis  Current tx: daily emollient of choice         Encounter Date: Jan 28, 2019    CC:  Chief Complaint   Patient presents with     Derm Problem     Anca is here for rash on back         History of Present Illness:  Ms. Anca Alanis is a 89 year old female who presents for self referral for a rash on her back. Notes that her  saw a spot on her back that he was concerned about. Says that it has become more prominent over these past three months. Doesn't bother patient and is not itchy. She denies putting anything over the skin area to manage it.     Past Medical History:   Patient Active Problem List   Diagnosis     Anxiety state     Backache     Dysthymic disorder     Hearing loss     Spinal stenosis, lumbar region, without neurogenic claudication     Osteoarthritis of multiple joints     Osteoporosis     Scoliosis (and kyphoscoliosis), idiopathic     Health Care Home     Sleep disorder     Moderate stage glaucoma - Left Eye     Senile cataracts of both eyes     Late onset Alzheimer's disease without behavioral disturbance     Abdominal pain     Past Medical History:   Diagnosis Date     Cataracts, both eyes      Glaucoma suspect      No past surgical history on file.    Social History:  Patient reports that  has never smoked. she has never used smokeless  tobacco. She reports that she does not drink alcohol or use drugs.    Family History:  Family History   Problem Relation Age of Onset     Hypertension Sister      Diabetes Paternal Grandmother      Diabetes Son        Medications:  Current Outpatient Medications   Medication Sig Dispense Refill     acetaminophen (TYLENOL) 325 MG tablet Take 2 tablets (650 mg) by mouth At Bedtime 100 tablet      alendronate (FOSAMAX) 70 MG tablet Take 1 tablet (70 mg) by mouth every 7 days On Thursdays. 13 tablet 3     Calcium Carbonate-Vitamin D (CALCIUM + D PO) 315 mg-200 IU tablet. Take 0.5 tablet by mouth daily with meals.       donepezil (ARICEPT) 5 MG tablet Take 1 tablet (5 mg) by mouth At Bedtime for memory. 90 tablet 1     melatonin 3 MG tablet Take 1 tablet (3 mg) by mouth At Bedtime 90 tablet 3     memantine (NAMENDA) 5 MG tablet Take 1 tablet (5 mg) by mouth 2 times daily for memory. 180 tablet 1     Multiple Vitamin (MULTIVITAMIN OR) Take 0.5 tablets by mouth daily        zolpidem (AMBIEN) 5 MG tablet Take 5 mg by mouth       Allergies   Allergen Reactions     Ciprofloxacin      Sulfa Drugs          Review of Systems:  -As per HPI  -Constitutional: Otherwise feeling well today, in usual state of health.  -Skin: As above in HPI. No additional skin concerns.    Physical exam:  Vitals: There were no vitals taken for this visit.  GEN: This is a well developed, well-nourished female in no acute distress, in a pleasant mood.    SKIN: Focused examination of the back, chest, neck, and face was performed.  -various tan, waxy plaques with a stuck on appearance on her back and neck  -dry skin on arms and forehead  -rough slightly erythematous pink macule on forehead and left cheek  -No other lesions of concern on areas examined.     Impression/Plan:  1. Actinic keratosis with xerosis cutis    Patient has diffusely dry skin on the face and arms and it is difficult to assess AK vs xerosis cutis. Some areas do show AK but discussed  with patient low concern for these regions and relatively negative side effects of treating with 5-fluorouracil to such a large are of skin    Recommended daily moisturizer to manage dry skin      2. Seborrheic keratoses, various    Reassured patient and  that the regions on her back and neck that they were concerned about are benign and part of the aging process        CC Referred Self, MD  No address on file on close of this encounter.  Follow-up in 1 year for annual skin check.     Staff Involved:  I, Bharathi Rosales, MS4, saw and examined the patient in the presence of Dr. Bean.  Staff Physician Comments:  I was present with the medical student who participated in the service and in the documentation of the note. I have verified the history and personally performed the physical exam and medical decision making. I agree with the assessment and plan as documented in the note. I have reviewed and if necessary amended the note.      Shivam Bean MD  Professor  Head of Dermato-Allergy Division  Department of Dermatology  [facility]

## 2019-01-28 NOTE — NURSING NOTE
Dermatology Rooming Note    Anca Alanis's goals for this visit include:   Chief Complaint   Patient presents with     Derm Problem     Anca is here for rash on back     Sandra Daley, CMA

## 2019-03-03 NOTE — TELEPHONE ENCOUNTER
[facility] phone call message - order or referral request for patient:     Order or referral being requested: Orders to admit to skilled nursing.       Additional Comments: Birdie Su states they are faxing a form to [phone]. She states they have spoken to Dr. Richards regarding the form that they are faxing to get this order in place. He is aware of this form. She states he needs to complete it today.     OK to leave a message on voice mail? Yes    Primary language: English      needed? No    Call taken on March 28, 2018 at 3:36 PM by Pamela Anguiano      
Message routed to PCP. Forms placed on desk    Wilda Ambrosio RN    
Paperwork completed and faxed.  Patient will be readmitted tonight to [facility].  She will receive PT, OT, nutritional support.  
Returned call to pt .  He said that his wife is shaky and frail and has not been eating.  He wants to know if pt can be readmitted to [facility] where she was until last week.  Called admissions there and spoke with Birdie Su who said that due to recent hospital discharge, pt should be able to return with Medicare coverage for up to two more weeks.      If that pt stays longer that that, they may need to private pay.  She will fax over physician orders for admission and would like last clinic visit notes from [facility], face sheet and insurance information.    Papers signed and returned and pt is able to admit tonight.  They are in contact with spouse and will advice him of the plan.    Tanja Quiroz  /Care Coordinator    
Alert and oriented to person, place and time, memory intact, behavior appropriate to situation, PERRL.

## 2019-04-04 DIAGNOSIS — F02.80 LATE ONSET ALZHEIMER'S DISEASE WITHOUT BEHAVIORAL DISTURBANCE (H): ICD-10-CM

## 2019-04-04 DIAGNOSIS — G30.1 LATE ONSET ALZHEIMER'S DISEASE WITHOUT BEHAVIORAL DISTURBANCE (H): ICD-10-CM

## 2019-04-04 RX ORDER — MEMANTINE HYDROCHLORIDE 5 MG/1
5 TABLET ORAL 2 TIMES DAILY
Qty: 180 TABLET | Refills: 1 | Status: SHIPPED | OUTPATIENT
Start: 2019-04-04 | End: 2019-07-08

## 2019-04-04 NOTE — TELEPHONE ENCOUNTER

## 2019-04-18 ENCOUNTER — TELEPHONE (OUTPATIENT)
Dept: FAMILY MEDICINE | Facility: CLINIC | Age: 84
End: 2019-04-18

## 2019-04-18 NOTE — TELEPHONE ENCOUNTER
Spoke with patient and patient's  to schedule follow up appointment with Dr. Richards on 4/24 at 1:40pm.

## 2019-04-18 NOTE — TELEPHONE ENCOUNTER
----- Message from Rafat Richards MD sent at 4/18/2019  8:36 AM CDT -----  Regarding: Please call to schedule appt  This patient contacted me on my administrative phone.  She would like an appointment for a check up.  She has dementia so all communication should be done through her  Kris.  Please call patient's  Kris to schedule the appointment.  Thanks, Boone

## 2019-04-23 DIAGNOSIS — G30.1 LATE ONSET ALZHEIMER'S DISEASE WITHOUT BEHAVIORAL DISTURBANCE (H): ICD-10-CM

## 2019-04-23 DIAGNOSIS — F02.80 LATE ONSET ALZHEIMER'S DISEASE WITHOUT BEHAVIORAL DISTURBANCE (H): ICD-10-CM

## 2019-04-23 NOTE — TELEPHONE ENCOUNTER

## 2019-04-24 ENCOUNTER — OFFICE VISIT (OUTPATIENT)
Dept: FAMILY MEDICINE | Facility: CLINIC | Age: 84
End: 2019-04-24
Payer: MEDICARE

## 2019-04-24 VITALS
DIASTOLIC BLOOD PRESSURE: 69 MMHG | OXYGEN SATURATION: 95 % | TEMPERATURE: 97.4 F | SYSTOLIC BLOOD PRESSURE: 131 MMHG | WEIGHT: 101 LBS | HEART RATE: 70 BPM | BODY MASS INDEX: 17.34 KG/M2

## 2019-04-24 DIAGNOSIS — K62.5 BRBPR (BRIGHT RED BLOOD PER RECTUM): Primary | ICD-10-CM

## 2019-04-24 DIAGNOSIS — K60.2 ANAL FISSURE: ICD-10-CM

## 2019-04-24 DIAGNOSIS — G30.1 LATE ONSET ALZHEIMER'S DISEASE WITHOUT BEHAVIORAL DISTURBANCE (H): ICD-10-CM

## 2019-04-24 DIAGNOSIS — F02.80 LATE ONSET ALZHEIMER'S DISEASE WITHOUT BEHAVIORAL DISTURBANCE (H): ICD-10-CM

## 2019-04-24 RX ORDER — DONEPEZIL HYDROCHLORIDE 5 MG/1
5 TABLET, FILM COATED ORAL AT BEDTIME
Qty: 90 TABLET | Refills: 1 | Status: SHIPPED | OUTPATIENT
Start: 2019-04-24 | End: 2019-11-04

## 2019-04-24 ASSESSMENT — ENCOUNTER SYMPTOMS
NAUSEA: 0
ACTIVITY CHANGE: 0
MYALGIAS: 0
SHORTNESS OF BREATH: 0
DYSPHORIC MOOD: 0
UNEXPECTED WEIGHT CHANGE: 0
FATIGUE: 0
FEVER: 0
DIARRHEA: 0
ANAL BLEEDING: 1
CONFUSION: 1
BLOOD IN STOOL: 1
ABDOMINAL PAIN: 0

## 2019-04-24 NOTE — PROGRESS NOTES
HPI:       89 year old patient who presents with:    BRBPR and f/u of dementia.  Over the past week has had some red streaking on stools.  Mild anal pain.  Stools have been of normal consistency.  No lightheadedness.  No N,V, or diarrhea.    Continues to live with  Kris.  Overall has been stable and mood has been good.  No falls.  Continues to get exercise but notes that she doesn't have the endurance she used to.       Problem, Medication and Allergy Lists were reviewed and are current.  Patient is an established patient of this clinic.         Review of Systems:     Review of Systems   Constitutional: Negative for activity change, fatigue, fever and unexpected weight change.   Respiratory: Negative for shortness of breath.    Cardiovascular: Negative for chest pain.   Gastrointestinal: Positive for anal bleeding and blood in stool. Negative for abdominal pain, diarrhea and nausea.   Musculoskeletal: Negative for myalgias.   Psychiatric/Behavioral: Positive for confusion. Negative for behavioral problems and dysphoric mood.          Physical Exam:     /69   Pulse 70   Temp 97.4  F (36.3  C) (Oral)   Wt 45.8 kg (101 lb)   SpO2 95%   BMI 17.34 kg/m      Body mass index is 17.34 kg/m .  Vitals reviewed.    Physical Exam   Constitutional: She appears well-developed. No distress.   Thin.     Neck: No thyromegaly present.   Cardiovascular: Normal rate, regular rhythm and normal heart sounds. Exam reveals no gallop.   No murmur heard.  Pulmonary/Chest: Effort normal. No respiratory distress. She has no wheezes. She has no rales.   Abdominal: Soft. Bowel sounds are normal. She exhibits no distension. There is no tenderness.   Anus:  Small NT external hemorrhoids.  There is a small ventral anal fissure, no active bleeding noted.   Musculoskeletal: She exhibits no edema or tenderness.   Lymphadenopathy:     She has no cervical adenopathy.   Neurological: She is alert.   Psychiatric: She has a normal  mood and affect.   Marked memory deficits, bright affect   Vitals reviewed.          Results:       Assessment and Plan     Anca was seen today for recheck.    Diagnoses and all orders for this visit:    BRBPR (bright red blood per rectum).  Due to anal fissure.  Will Rx anusol HC and encouraged plenty of fluid intake.  F/U 2 weeks if not improving.    Late onset Alzheimer's disease without behavioral disturbance.  She has done well, continues in middle stage disease.  Continue donepezil.    Anal fissure.  See above.        Options for treatment and follow-up care were reviewed with the patient. Anca Alanis engaged in the decision making process and verbalized understanding of the options discussed and agreed with the final plan.    Rafat Richards MD

## 2019-04-24 NOTE — PATIENT INSTRUCTIONS
You have an anal fissure.    1.  Try anusol cream in the rectum 2 times per day for 3 days.    2.  After that, use the cream if bleeding continues.    3.  More important, drink enough fluids to keep stool soft.    If still bleeding the week after your birthday, call me.

## 2019-06-14 ENCOUNTER — OFFICE VISIT (OUTPATIENT)
Dept: DERMATOLOGY | Facility: CLINIC | Age: 84
End: 2019-06-14
Payer: MEDICARE

## 2019-06-14 DIAGNOSIS — L72.0 EIC (EPIDERMAL INCLUSION CYST): Primary | ICD-10-CM

## 2019-06-14 ASSESSMENT — PAIN SCALES - GENERAL: PAINLEVEL: NO PAIN (0)

## 2019-06-14 NOTE — NURSING NOTE
Dermatology Rooming Note    Anca Alanis's goals for this visit include:   Chief Complaint   Patient presents with     Derm Problem     Anca is here today to have a lump on her back looked at.      MIREYA Elliott

## 2019-06-14 NOTE — LETTER
6/14/2019       RE: Anca Alanis  [address] [place] [address]     Dear Colleague,    Thank you for referring your patient, Anca Alanis, to the [facility] DERMATOLOGY at [facility]. Please see a copy of my visit note below.    [facility] Dermatology Note      Dermatology Problem List:  1. actinic keratoses of the face (forehead and left cheek)  -deferring treatment at this time given superimposed overlying dry skin and negative side effects of applying 5-fluorouracil to such a large are of skin   2. Seborrheic keratoses, various on neck and back   3. Xerosis cutis  Current tx: daily emollient of choice   4. Cyst- Referral to derm Surgery     Encounter Date: Jun 14, 2019    CC:  Chief Complaint   Patient presents with     Derm Problem     Anca is here today to have a lump on her back looked at.          History of Present Illness:  Ms. Anca Alanis is a 89 year old female who is a return patient present for lesion of concern. Patient was last seen on 1/28/19 when patient deferred treatment of AKs on the forearms due to the side effects of 5-fluorouracil. At today's visit patient notes a lump on the back of her neck. Patient notices this lump about two to three weeks. Notes that this lesion is not painful but tends to be irritating and she seems to be aware of it all the time and rubs it. The patient denies painful, itching, tingling or bleeding lesions unless otherwise noted.      Past Medical History:   Patient Active Problem List   Diagnosis     Anxiety state     Backache     Dysthymic disorder     Hearing loss     Spinal stenosis, lumbar region, without neurogenic claudication     Osteoarthritis of multiple joints     Osteoporosis     Scoliosis (and kyphoscoliosis), idiopathic     Health Care Home     Sleep disorder     Moderate stage glaucoma - Left Eye     Senile cataracts of both eyes     Late onset Alzheimer's  disease without behavioral disturbance     Abdominal pain     Anal fissure     Past Medical History:   Diagnosis Date     Cataracts, both eyes      Glaucoma suspect      No past surgical history on file.    Social History:   reports that she has never smoked. She has never used smokeless tobacco. She reports that she does not drink alcohol or use drugs.    Family History:  Family History   Problem Relation Age of Onset     Hypertension Sister      Diabetes Paternal Grandmother      Diabetes Son        Medications:  Current Outpatient Medications   Medication Sig Dispense Refill     acetaminophen (TYLENOL) 325 MG tablet Take 2 tablets (650 mg) by mouth At Bedtime 100 tablet      alendronate (FOSAMAX) 70 MG tablet Take 1 tablet (70 mg) by mouth every 7 days On Thursdays. 13 tablet 3     Calcium Carbonate-Vitamin D (CALCIUM + D PO) 315 mg-200 IU tablet. Take 0.5 tablet by mouth daily with meals.       donepezil (ARICEPT) 5 MG tablet Take 1 tablet (5 mg) by mouth At Bedtime for memory. 90 tablet 1     hydrocortisone (ANUSOL-HC) 2.5 % cream Place rectally 2 times daily as needed for hemorrhoids 30 g 1     melatonin 3 MG tablet Take 1 tablet (3 mg) by mouth At Bedtime 90 tablet 3     memantine (NAMENDA) 5 MG tablet Take 1 tablet (5 mg) by mouth 2 times daily for memory. 180 tablet 1     Multiple Vitamin (MULTIVITAMIN OR) Take 0.5 tablets by mouth daily        zolpidem (AMBIEN) 5 MG tablet Take 5 mg by mouth         Allergies   Allergen Reactions     Ciprofloxacin      Sulfa Drugs        Review of Systems:  -Constitutional: The patient denies fatigue, fevers, chills, unintended weight loss, and night sweats.  -HEENT: Patient denies nonhealing oral sores.  -Skin: As above in HPI. No additional skin concerns.    Physical exam:  Vitals: There were no vitals taken for this visit.  GEN: This is a well developed, well-nourished female in no acute distress, in a pleasant mood.    SKIN: Focused examination of the face and neck was  performed.  -on the posterior neck there is 1 x 1.5 cm, non tender, mobile nodule, with a central punctum consistent with an EIC. No current inflammation.  -No other lesions of concern on areas examined.       Impression/Plan:  1. Epidermal Inclusion Cyst    Educated on the etiology, reassured benign    Referral to Derm Surg. with Dr. Natarajan or Dr. Blandon for excision as this is particularly bothersome to patient and she would like it removed     Photograph was obtained for clinical monitoring and inclusion in medical record.    CC Dr. Singleton on close of this encounter.  Follow-up prn for new or changing lesions.        Staff Involved:  Staff/Scribe    Scribe Disclosure:  I, Nargis Paulino, am serving as a scribe to document services personally performed by Cande Chaves PA-C, based on data collection and the provider's statements to me.     Provider Disclosure:   The documentation recorded by the scribe accurately reflects the services I personally performed and the decisions made by me.    All risks, benefits and alternatives were discussed with patient.  Patient is in agreement and understands the assessment and plan.  All questions were answered.    Cande Chaves PA-C  [facility]: Phone: [phone], Fax: [phone]

## 2019-06-14 NOTE — PROGRESS NOTES
[facility] Dermatology Note      Dermatology Problem List:  1. actinic keratoses of the face (forehead and left cheek)  -deferring treatment at this time given superimposed overlying dry skin and negative side effects of applying 5-fluorouracil to such a large are of skin   2. Seborrheic keratoses, various on neck and back   3. Xerosis cutis  Current tx: daily emollient of choice   4. Cyst- Referral to derm Surgery     Encounter Date: Jun 14, 2019    CC:  Chief Complaint   Patient presents with     Derm Problem     Anca is here today to have a lump on her back looked at.          History of Present Illness:  Ms. Anca Alanis is a 89 year old female who is a return patient present for lesion of concern. Patient was last seen on 1/28/19 when patient deferred treatment of AKs on the forearms due to the side effects of 5-fluorouracil. At today's visit patient notes a lump on the back of her neck. Patient notices this lump about two to three weeks. Notes that this lesion is not painful but tends to be irritating and she seems to be aware of it all the time and rubs it. The patient denies painful, itching, tingling or bleeding lesions unless otherwise noted.      Past Medical History:   Patient Active Problem List   Diagnosis     Anxiety state     Backache     Dysthymic disorder     Hearing loss     Spinal stenosis, lumbar region, without neurogenic claudication     Osteoarthritis of multiple joints     Osteoporosis     Scoliosis (and kyphoscoliosis), idiopathic     Health Care Home     Sleep disorder     Moderate stage glaucoma - Left Eye     Senile cataracts of both eyes     Late onset Alzheimer's disease without behavioral disturbance     Abdominal pain     Anal fissure     Past Medical History:   Diagnosis Date     Cataracts, both eyes      Glaucoma suspect      No past surgical history on file.    Social History:   reports that she has never smoked. She has never used smokeless tobacco.  She reports that she does not drink alcohol or use drugs.    Family History:  Family History   Problem Relation Age of Onset     Hypertension Sister      Diabetes Paternal Grandmother      Diabetes Son        Medications:  Current Outpatient Medications   Medication Sig Dispense Refill     acetaminophen (TYLENOL) 325 MG tablet Take 2 tablets (650 mg) by mouth At Bedtime 100 tablet      alendronate (FOSAMAX) 70 MG tablet Take 1 tablet (70 mg) by mouth every 7 days On Thursdays. 13 tablet 3     Calcium Carbonate-Vitamin D (CALCIUM + D PO) 315 mg-200 IU tablet. Take 0.5 tablet by mouth daily with meals.       donepezil (ARICEPT) 5 MG tablet Take 1 tablet (5 mg) by mouth At Bedtime for memory. 90 tablet 1     hydrocortisone (ANUSOL-HC) 2.5 % cream Place rectally 2 times daily as needed for hemorrhoids 30 g 1     melatonin 3 MG tablet Take 1 tablet (3 mg) by mouth At Bedtime 90 tablet 3     memantine (NAMENDA) 5 MG tablet Take 1 tablet (5 mg) by mouth 2 times daily for memory. 180 tablet 1     Multiple Vitamin (MULTIVITAMIN OR) Take 0.5 tablets by mouth daily        zolpidem (AMBIEN) 5 MG tablet Take 5 mg by mouth         Allergies   Allergen Reactions     Ciprofloxacin      Sulfa Drugs        Review of Systems:  -Constitutional: The patient denies fatigue, fevers, chills, unintended weight loss, and night sweats.  -HEENT: Patient denies nonhealing oral sores.  -Skin: As above in HPI. No additional skin concerns.    Physical exam:  Vitals: There were no vitals taken for this visit.  GEN: This is a well developed, well-nourished female in no acute distress, in a pleasant mood.    SKIN: Focused examination of the face and neck was performed.  -on the posterior neck there is 1 x 1.5 cm, non tender, mobile nodule, with a central punctum consistent with an EIC. No current inflammation.  -No other lesions of concern on areas examined.       Impression/Plan:  1. Epidermal Inclusion Cyst    Educated on the etiology, reassured  benign    Referral to Derm Surg. with Dr. Natarajan or Dr. Blandon for excision as this is particularly bothersome to patient and she would like it removed     Photograph was obtained for clinical monitoring and inclusion in medical record.    CC Dr. Singleton on close of this encounter.  Follow-up prn for new or changing lesions.        Staff Involved:  Staff/Scribe    Scribe Disclosure:  I, Nargis Paulino, am serving as a scribe to document services personally performed by Cande Chaves PA-C, based on data collection and the provider's statements to me.     Provider Disclosure:   The documentation recorded by the scribe accurately reflects the services I personally performed and the decisions made by me.    All risks, benefits and alternatives were discussed with patient.  Patient is in agreement and understands the assessment and plan.  All questions were answered.    Cande Chaves PA-C  [facility]: Phone: [phone], Fax: [phone]

## 2019-06-17 DIAGNOSIS — H40.003 GLAUCOMA SUSPECT OF BOTH EYES: Primary | ICD-10-CM

## 2019-06-18 ENCOUNTER — OFFICE VISIT (OUTPATIENT)
Dept: OPHTHALMOLOGY | Facility: CLINIC | Age: 84
End: 2019-06-18
Attending: OPHTHALMOLOGY
Payer: MEDICARE

## 2019-06-18 DIAGNOSIS — H52.13 MYOPIA WITH ASTIGMATISM AND PRESBYOPIA, BILATERAL: ICD-10-CM

## 2019-06-18 DIAGNOSIS — H52.203 MYOPIA WITH ASTIGMATISM AND PRESBYOPIA, BILATERAL: ICD-10-CM

## 2019-06-18 DIAGNOSIS — H52.4 MYOPIA WITH ASTIGMATISM AND PRESBYOPIA, BILATERAL: ICD-10-CM

## 2019-06-18 DIAGNOSIS — H40.003 GLAUCOMA SUSPECT OF BOTH EYES: Primary | ICD-10-CM

## 2019-06-18 DIAGNOSIS — H25.813 COMBINED FORMS OF AGE-RELATED CATARACT OF BOTH EYES: ICD-10-CM

## 2019-06-18 PROCEDURE — 92083 EXTENDED VISUAL FIELD XM: CPT | Mod: ZF | Performed by: OPHTHALMOLOGY

## 2019-06-18 PROCEDURE — G0463 HOSPITAL OUTPT CLINIC VISIT: HCPCS | Mod: ZF

## 2019-06-18 ASSESSMENT — REFRACTION_MANIFEST
OS_SPHERE: -0.75
OD_ADD: +2.75
OD_CYLINDER: +0.75
OS_ADD: +2.75
OS_CYLINDER: +1.25
OD_AXIS: 080
OS_AXIS: 065
OD_SPHERE: -2.25

## 2019-06-18 ASSESSMENT — VISUAL ACUITY
OD_CC: 20/40
OD_PH_CC: 20/30
OS_PH_CC: 20/40
CORRECTION_TYPE: GLASSES
METHOD: SNELLEN - LINEAR
OS_CC: 20/50

## 2019-06-18 ASSESSMENT — CONF VISUAL FIELD
OS_NORMAL: 1
METHOD: COUNTING FINGERS
OD_NORMAL: 1

## 2019-06-18 ASSESSMENT — EXTERNAL EXAM - LEFT EYE: OS_EXAM: NORMAL

## 2019-06-18 ASSESSMENT — TONOMETRY
IOP_METHOD: TONOPEN
OS_IOP_MMHG: 19
OD_IOP_MMHG: 10

## 2019-06-18 ASSESSMENT — SLIT LAMP EXAM - LIDS
COMMENTS: DERMATOCHALASIS
COMMENTS: DERMATOCHALASIS

## 2019-06-18 ASSESSMENT — CUP TO DISC RATIO
OD_RATIO: 0.6
OS_RATIO: 0.5

## 2019-06-18 ASSESSMENT — EXTERNAL EXAM - RIGHT EYE: OD_EXAM: NORMAL

## 2019-06-18 NOTE — PROGRESS NOTES
HPI     Yearly Exam     In both eyes.  Onset was gradual.  This started years ago.  Quality: States va is the same since last visit  .  Occurring constantly.  Associated symptoms include Negative for flashes.  Pain was noted as 0/10.              Comments     Patient states vision good both with and without glasses. Denies any visual changes, eye pain, discomfort, tearing or itching. Denies issues with glare or haloes.     Bri Lux COT 9:49 AM June 18, 2019             Last edited by Cliff Adams MD on 6/18/2019 11:49 AM. (History)        Assessment & Plan      Anca Alanis is a 89 year old female with the following diagnoses:   1. Glaucoma suspect of both eyes    2. Combined forms of age-related cataract of both eyes    3. Myopia with astigmatism and presbyopia, bilateral       Visually significant cataracts in both eyes, however patient not endorsing significant symptoms at this time  Updated Rx provided today  Monitor for now    Glaucoma suspect, both eyes  Stable visual fields with small inferior arcuate left eye   Maximum intraocular pressure 20/20  Today intraocular pressure 10/19  Family history: negative  Trauma history: negative  Pachmetry 637/637 per history   VF today with generalized depression in both eyes, no glaucomatous changes noted  Continue to monitor for now    Patient disposition:   Return in about 1 year (around 6/18/2020) for Annual Visit. Sooner as needed.      Cliff Adams MD  Ophthalmology Resident, PGY-2    Attending Physician Attestation:  Complete documentation of historical and exam elements from today's encounter can be found in the full encounter summary report (not reduplicated in this progress note).  I personally obtained the chief complaint(s) and history of present illness.  I confirmed and edited as necessary the review of systems, past medical/surgical history, family history, social history, and examination findings as documented by others; and I  examined the patient myself.  I personally reviewed the relevant tests, images, and reports as documented above.  I formulated and edited as necessary the assessment and plan and discussed the findings and management plan with the patient and family. Attending Physician Image/Tesing Attestation: I personally reviewed the ophthalmic test(s) associated with this encounter, agree with the interpretation(s) as documented by the resident/fellow, and have edited the corresponding report(s) as necessary.  . - Gallo Johnson MD

## 2019-06-18 NOTE — NURSING NOTE
Chief Complaints and History of Present Illnesses   Patient presents with     Yearly Exam     Chief Complaint(s) and History of Present Illness(es)     Yearly Exam     Laterality: both eyes    Onset: gradual    Onset: years ago    Quality: States va is the same since last visit      Frequency: constantly    Associated symptoms: Negative for flashes    Pain scale: 0/10              Comments     Bri GUY 9:49 AM June 18, 2019

## 2019-07-01 ENCOUNTER — OFFICE VISIT (OUTPATIENT)
Dept: DERMATOLOGY | Facility: CLINIC | Age: 84
End: 2019-07-01
Payer: MEDICARE

## 2019-07-01 VITALS — SYSTOLIC BLOOD PRESSURE: 146 MMHG | HEART RATE: 77 BPM | DIASTOLIC BLOOD PRESSURE: 68 MMHG

## 2019-07-01 DIAGNOSIS — L72.0 EPIDERMOID CYST: Primary | ICD-10-CM

## 2019-07-01 PROCEDURE — 88304 TISSUE EXAM BY PATHOLOGIST: CPT | Mod: TC | Performed by: DERMATOLOGY

## 2019-07-01 RX ORDER — LIDOCAINE HYDROCHLORIDE AND EPINEPHRINE 10; 10 MG/ML; UG/ML
3 INJECTION, SOLUTION INFILTRATION; PERINEURAL ONCE
Status: DISCONTINUED | OUTPATIENT
Start: 2019-07-01 | End: 2021-01-27

## 2019-07-01 ASSESSMENT — PAIN SCALES - GENERAL: PAINLEVEL: NO PAIN (0)

## 2019-07-01 NOTE — PROGRESS NOTES
DERMATOLOGIC EXCISION SURGERY PROCEDURE NOTE   Dermatology Problem List:  1. Actinic keratoses of the face (forehead and left cheek)  -deferring treatment at this time given superimposed overlying dry skin and negative side effects of applying 5-fluorouracil to such a large area of skin   2. Seborrheic keratoses, various on neck and back   3. Xerosis cutis  Current tx: daily emollient of choice   4. Epidermoid cyst s/p excision on the posterior neck 7/1/19      NAME OF PROCEDURE: Excision with complex linear closure  Staff surgeon: Rosa Isela  Resident: Dilan  Scrub nurse: Bigg  PRE-OPERATIVE DIAGNOSIS: epidermoid cyst  POST-OPERATIVE DIAGNOSIS: Same   LOCATION: posterior neck  PRE-OPERATIVE SIZE: 1.2x1.8 cm  MARGIN: 0 cm  FINAL DEFECT SIZE: 0.8x2.4 cm  FINAL REPAIR LENGTH: 3.4 cm   ANESTHESIA: 1% lidocaine with 1:100,000 epinephrine    INDICATIONS: This patient presented with a 1.2x1.8 cm epidermoid cyst. Excision was indicated.   We discussed the principles of treatment and most likely complications including bleeding, infection, scarring, alteration in skin color and sensation, wound dehiscence,muscle weakness in the area, or recurrence of the lesion or disease. We reviewed that on occasion, after healing, a secondary procedure or revision may be recommended in order to obtain the best cosmetic or functional result.   PROCEDURE: The patient was taken to the operative suite. Time-out was performed. The treatment area was anesthetized. The area was washed with chlorhexidine, rinsed with saline and draped with sterile towels. The lesion was delineated and excised down to deep subcutaneous fat in a fusiform manner. Hemostasis was obtained by electrocoagulation.   REPAIR: A complex layered linear closure was selected as the procedure which would maximally preserve both function and cosmesis and for the following reasons: 1) the defect was widely undermined; 2) multiple deep plication and/or layered sutures placed;  3) wound size, depth, tension, and location.   After the excision of the tumor, the area was extensively and carefully undermined. Hemostasis was obtained with spot electrocautery and ligation of vessels where necessary. An initial deep plication sutures of 4-0 Vicryl sutures were placed in the deep, subcutaneous and fascial planes to appose the lateral margins. The subcutaneous and dermal layers were then closed with additional 4-0 Monocryl sutures. The epidermis was then carefully approximated along the length of the wound using 4-0 Prolene running sutures.   Estimated blood loss was less than 10 ml for all surgical sites. A sterile pressure dressing was applied and wound care instructions, with a written handout, were given. The patient was discharged from dermatology clinic alert and ambulatory.    FU for suture removal in 10-14 days and in dermatology clinic with MD as needed.      Staff Involved:  Staff Only  Chris Natarajan MD   of Dermatology  Department of Dermatology  [facility]

## 2019-07-01 NOTE — NURSING NOTE
Dermatology Rooming Note    Anca Alanis's goals for this visit include:   Chief Complaint   Patient presents with     Derm Problem     Anca is here today for an excision of a cyst on the back of her neck     Ángela Pride CMA

## 2019-07-01 NOTE — PATIENT INSTRUCTIONS

## 2019-07-01 NOTE — LETTER
7/1/2019       RE: Anca Alanis  [address] [place] [address]     Dear Colleague,    Thank you for referring your patient, Anca Alanis, to the [facility] DERMATOLOGY at [facility]. Please see a copy of my visit note below.    DERMATOLOGIC EXCISION SURGERY PROCEDURE NOTE   Dermatology Problem List:  1. Actinic keratoses of the face (forehead and left cheek)  -deferring treatment at this time given superimposed overlying dry skin and negative side effects of applying 5-fluorouracil to such a large area of skin   2. Seborrheic keratoses, various on neck and back   3. Xerosis cutis  Current tx: daily emollient of choice   4. Epidermoid cyst s/p excision on the posterior neck 7/1/19      NAME OF PROCEDURE: Excision with complex linear closure  Staff surgeon: Rosa Isela  Resident: Dilan  Scrub nurse: Bigg  PRE-OPERATIVE DIAGNOSIS: epidermoid cyst  POST-OPERATIVE DIAGNOSIS: Same   LOCATION: posterior neck  PRE-OPERATIVE SIZE: 1.2x1.8 cm  MARGIN: 0 cm  FINAL DEFECT SIZE: 0.8x2.4 cm  FINAL REPAIR LENGTH: 3.4 cm   ANESTHESIA: 1% lidocaine with 1:100,000 epinephrine    INDICATIONS: This patient presented with a 1.2x1.8 cm epidermoid cyst. Excision was indicated.   We discussed the principles of treatment and most likely complications including bleeding, infection, scarring, alteration in skin color and sensation, wound dehiscence,muscle weakness in the area, or recurrence of the lesion or disease. We reviewed that on occasion, after healing, a secondary procedure or revision may be recommended in order to obtain the best cosmetic or functional result.   PROCEDURE: The patient was taken to the operative suite. Time-out was performed. The treatment area was anesthetized. The area was washed with chlorhexidine, rinsed with saline and draped with sterile towels. The lesion was delineated and excised down to deep subcutaneous fat in a fusiform manner.  Hemostasis was obtained by electrocoagulation.   REPAIR: A complex layered linear closure was selected as the procedure which would maximally preserve both function and cosmesis and for the following reasons: 1) the defect was widely undermined; 2) multiple deep plication and/or layered sutures placed; 3) wound size, depth, tension, and location.   After the excision of the tumor, the area was extensively and carefully undermined. Hemostasis was obtained with spot electrocautery and ligation of vessels where necessary. An initial deep plication sutures of 4-0 Vicryl sutures were placed in the deep, subcutaneous and fascial planes to appose the lateral margins. The subcutaneous and dermal layers were then closed with additional 4-0 Monocryl sutures. The epidermis was then carefully approximated along the length of the wound using 4-0 Prolene running sutures.   Estimated blood loss was less than 10 ml for all surgical sites. A sterile pressure dressing was applied and wound care instructions, with a written handout, were given. The patient was discharged from dermatology clinic alert and ambulatory.    FU for suture removal in 10-14 days and in dermatology clinic with MD as needed.      Staff Involved:  Staff Only  Chris Natarajan MD   of Dermatology  Department of Dermatology  [facility]

## 2019-07-02 LAB — COPATH REPORT: NORMAL

## 2019-07-08 DIAGNOSIS — F02.80 LATE ONSET ALZHEIMER'S DISEASE WITHOUT BEHAVIORAL DISTURBANCE (H): ICD-10-CM

## 2019-07-08 DIAGNOSIS — G30.1 LATE ONSET ALZHEIMER'S DISEASE WITHOUT BEHAVIORAL DISTURBANCE (H): ICD-10-CM

## 2019-07-08 RX ORDER — MEMANTINE HYDROCHLORIDE 5 MG/1
5 TABLET ORAL 2 TIMES DAILY
Qty: 180 TABLET | Refills: 1 | Status: SHIPPED | OUTPATIENT
Start: 2019-07-08 | End: 2019-09-23 | Stop reason: ALTCHOICE

## 2019-07-15 ENCOUNTER — ALLIED HEALTH/NURSE VISIT (OUTPATIENT)
Dept: DERMATOLOGY | Facility: CLINIC | Age: 84
End: 2019-07-15
Payer: MEDICARE

## 2019-07-15 DIAGNOSIS — Z48.02 VISIT FOR SUTURE REMOVAL: Primary | ICD-10-CM

## 2019-07-15 NOTE — NURSING NOTE
Anca Alanis comes into clinic today at the request of Dr Natarajan Ordering Provider for suture removal of posterior neck. No redness at site. Denies pain. Area cleansed with alcohol swab. All sutures removed without complication. Vaseline and bandage applied to be left in on for 24 hrs. Pt agrees to monitor symptoms and contact clinic with concerns.    This service provided today was under the supervising provider of the day Dr Natarajan, who was available if needed.    Maryann Lopez RN

## 2019-08-09 DIAGNOSIS — M81.0 OSTEOPOROSIS, UNSPECIFIED OSTEOPOROSIS TYPE, UNSPECIFIED PATHOLOGICAL FRACTURE PRESENCE: ICD-10-CM

## 2019-08-09 RX ORDER — ALENDRONATE SODIUM 70 MG/1
70 TABLET ORAL
Qty: 13 TABLET | Refills: 3 | Status: SHIPPED | OUTPATIENT
Start: 2019-08-09 | End: 2020-03-04

## 2019-08-09 NOTE — TELEPHONE ENCOUNTER
"Request for medication refill: Fosamax 70mg tabs    Providers if patient needs an appointment and you are willing to give a one month supply please refill for one month and  send a letter/MyChart using \".SMILLIMITEDREFILL\" .smillimited and route chart to \"P SMI \" (Giving one month refill in non controlled medications is strongly recommended before denial)    If refill has been denied, meaning absolutely no refills without visit, please complete the smart phrase \".smirxrefuse\" and route it to the \"P SMI MED REFILLS\"  pool to inform the patient and the pharmacy.    Ada Gross CMA        "

## 2019-08-10 ENCOUNTER — OFFICE VISIT (OUTPATIENT)
Dept: DERMATOLOGY | Facility: CLINIC | Age: 84
End: 2019-08-10
Payer: MEDICARE

## 2019-08-10 DIAGNOSIS — L82.1 SEBORRHEIC KERATOSES: Primary | ICD-10-CM

## 2019-08-10 DIAGNOSIS — L81.4 LENTIGINES: ICD-10-CM

## 2019-08-10 ASSESSMENT — PAIN SCALES - GENERAL: PAINLEVEL: NO PAIN (0)

## 2019-08-10 NOTE — LETTER
"8/10/2019       RE: Anca Alanis  [address] [place] [address]     Dear Colleague,    Thank you for referring your patient, Anca Alanis, to the [place] [facility]. Please see a copy of my visit note below.    [facility] Dermatology Note      Dermatology Problem List:  1. Actinic keratoses of the face (forehead and left cheek)  -deferring treatment at this time given superimposed overlying dry skin and negative side effects of applying 5-fluorouracil to such a large area of skin   2. Seborrheic keratoses/lentigines, numerous on forearms and back   3. Xerosis cutis  Current tx: daily emollient of choice   4. Epidermoid cyst s/p excision on the posterior neck 7/1/19    Encounter Date: Aug 10, 2019    CC:  Chief Complaint   Patient presents with     Derm Problem     Anca is here today to be seen for a rash on her back.          History of Present Illness:  Ms. Anca Alanis is a 89 year old female who is a return patient to the clinic and presents for a rash on her back. The patient was last seen on 07/01/2019 by Dr. Natarajan when an epidermoid cyst on the posterior neck was excised. States this areas has healed well and is no longer bothersome to her.    At today's visit, the patient is here with her . The patient notes the rash on her back has been present intermittently for \"a while.\" She first noticed the rash several weeks ago. She denies it being itchy. She says the rash has not spread and they have been applying lotion on the rash without success in alleviating the rash. Additionally, the patient voices concern over lesions on her forearms. The patient denies painful, itching, tingling or bleeding lesions unless otherwise noted.      Past Medical History:   Patient Active Problem List   Diagnosis     Anxiety state     Backache     Dysthymic disorder     Hearing loss     Spinal stenosis, lumbar " region, without neurogenic claudication     Osteoarthritis of multiple joints     Osteoporosis     Scoliosis (and kyphoscoliosis), idiopathic     Health Care Home     Sleep disorder     Moderate stage glaucoma - Left Eye     Senile cataracts of both eyes     Late onset Alzheimer's disease without behavioral disturbance     Abdominal pain     Anal fissure     Past Medical History:   Diagnosis Date     Cataracts, both eyes      Glaucoma suspect      No past surgical history on file.    Social History:   reports that she has never smoked. She has never used smokeless tobacco. She reports that she does not drink alcohol or use drugs.    Family History:  Family History   Problem Relation Age of Onset     Hypertension Sister      Diabetes Paternal Grandmother      Diabetes Son        Medications:  Current Outpatient Medications   Medication Sig Dispense Refill     acetaminophen (TYLENOL) 325 MG tablet Take 2 tablets (650 mg) by mouth At Bedtime 100 tablet      alendronate (FOSAMAX) 70 MG tablet Take 1 tablet (70 mg) by mouth every 7 days On Thursdays. 13 tablet 3     Calcium Carbonate-Vitamin D (CALCIUM + D PO) 315 mg-200 IU tablet. Take 0.5 tablet by mouth daily with meals.       donepezil (ARICEPT) 5 MG tablet Take 1 tablet (5 mg) by mouth At Bedtime for memory. 90 tablet 1     hydrocortisone (ANUSOL-HC) 2.5 % cream Place rectally 2 times daily as needed for hemorrhoids 30 g 1     melatonin 3 MG tablet Take 1 tablet (3 mg) by mouth At Bedtime 90 tablet 3     memantine (NAMENDA) 5 MG tablet Take 1 tablet (5 mg) by mouth 2 times daily for memory. 180 tablet 1     Multiple Vitamin (MULTIVITAMIN OR) Take 0.5 tablets by mouth daily        zolpidem (AMBIEN) 5 MG tablet Take 5 mg by mouth         Allergies   Allergen Reactions     Ciprofloxacin      Sulfa Drugs Nausea       Review of Systems:  -Constitutional: The patient denies fatigue, fevers, chills, unintended weight loss, and night sweats.  -HEENT: Patient denies  nonhealing oral sores.  -Skin: As above in HPI. No additional skin concerns.    Physical exam:  Vitals: There were no vitals taken for this visit.  GEN: This is a well developed, well-nourished female in no acute distress, in a pleasant mood.    SKIN: Focused examination of the face, neck, bilateral forearms and back was performed.  -There are numerous waxy stuck on tan to brown papules on the back.  -Scattered brown macules on the bilateral forearms  -No other lesions of concern on areas examined.     Impression/Plan:  1. Lentigines - numerous - scattered on bilateral forearms    Reassured benign    2. Seborrheic keratosis, non irritated - several scattered on the back    Educated on the etiology    Reassured benign    No further intervention required. Patient to report changes.     Recommended moisturizing the skin to alleviate possible itchiness    CC Dr. Singleton on close of this encounter.  Follow-up prn for new or changing lesions.      Staff Involved:  Staff/Scribe    Scribe Disclosure:  I, Asaf Langley, am serving as a scribe to document services personally performed by Cande Chaves PA-C, based on data collection and the provider's statements to me.     Provider Disclosure:   The documentation recorded by the scribe accurately reflects the services I personally performed and the decisions made by me.    All risks, benefits and alternatives were discussed with patient.  Patient is in agreement and understands the assessment and plan.  All questions were answered.    Cande Chaves PA-C  [facility]: Phone: [phone], Fax: [phone]

## 2019-08-10 NOTE — PROGRESS NOTES
"[facility] Dermatology Note      Dermatology Problem List:  1. Actinic keratoses of the face (forehead and left cheek)  -deferring treatment at this time given superimposed overlying dry skin and negative side effects of applying 5-fluorouracil to such a large area of skin   2. Seborrheic keratoses/lentigines, numerous on forearms and back   3. Xerosis cutis  Current tx: daily emollient of choice   4. Epidermoid cyst s/p excision on the posterior neck 7/1/19    Encounter Date: Aug 10, 2019    CC:  Chief Complaint   Patient presents with     Derm Problem     Anca is here today to be seen for a rash on her back.          History of Present Illness:  Ms. Anca Alanis is a 89 year old female who is a return patient to the clinic and presents for a rash on her back. The patient was last seen on 07/01/2019 by Dr. Natarajan when an epidermoid cyst on the posterior neck was excised. States this areas has healed well and is no longer bothersome to her.    At today's visit, the patient is here with her . The patient notes the rash on her back has been present intermittently for \"a while.\" She first noticed the rash several weeks ago. She denies it being itchy. She says the rash has not spread and they have been applying lotion on the rash without success in alleviating the rash. Additionally, the patient voices concern over lesions on her forearms. The patient denies painful, itching, tingling or bleeding lesions unless otherwise noted.      Past Medical History:   Patient Active Problem List   Diagnosis     Anxiety state     Backache     Dysthymic disorder     Hearing loss     Spinal stenosis, lumbar region, without neurogenic claudication     Osteoarthritis of multiple joints     Osteoporosis     Scoliosis (and kyphoscoliosis), idiopathic     Health Care Home     Sleep disorder     Moderate stage glaucoma - Left Eye     Senile cataracts of both eyes     Late onset Alzheimer's disease without " behavioral disturbance     Abdominal pain     Anal fissure     Past Medical History:   Diagnosis Date     Cataracts, both eyes      Glaucoma suspect      No past surgical history on file.    Social History:   reports that she has never smoked. She has never used smokeless tobacco. She reports that she does not drink alcohol or use drugs.    Family History:  Family History   Problem Relation Age of Onset     Hypertension Sister      Diabetes Paternal Grandmother      Diabetes Son        Medications:  Current Outpatient Medications   Medication Sig Dispense Refill     acetaminophen (TYLENOL) 325 MG tablet Take 2 tablets (650 mg) by mouth At Bedtime 100 tablet      alendronate (FOSAMAX) 70 MG tablet Take 1 tablet (70 mg) by mouth every 7 days On Thursdays. 13 tablet 3     Calcium Carbonate-Vitamin D (CALCIUM + D PO) 315 mg-200 IU tablet. Take 0.5 tablet by mouth daily with meals.       donepezil (ARICEPT) 5 MG tablet Take 1 tablet (5 mg) by mouth At Bedtime for memory. 90 tablet 1     hydrocortisone (ANUSOL-HC) 2.5 % cream Place rectally 2 times daily as needed for hemorrhoids 30 g 1     melatonin 3 MG tablet Take 1 tablet (3 mg) by mouth At Bedtime 90 tablet 3     memantine (NAMENDA) 5 MG tablet Take 1 tablet (5 mg) by mouth 2 times daily for memory. 180 tablet 1     Multiple Vitamin (MULTIVITAMIN OR) Take 0.5 tablets by mouth daily        zolpidem (AMBIEN) 5 MG tablet Take 5 mg by mouth         Allergies   Allergen Reactions     Ciprofloxacin      Sulfa Drugs Nausea       Review of Systems:  -Constitutional: The patient denies fatigue, fevers, chills, unintended weight loss, and night sweats.  -HEENT: Patient denies nonhealing oral sores.  -Skin: As above in HPI. No additional skin concerns.    Physical exam:  Vitals: There were no vitals taken for this visit.  GEN: This is a well developed, well-nourished female in no acute distress, in a pleasant mood.    SKIN: Focused examination of the face, neck, bilateral  forearms and back was performed.  -There are numerous waxy stuck on tan to brown papules on the back.  -Scattered brown macules on the bilateral forearms  -No other lesions of concern on areas examined.     Impression/Plan:  1. Lentigines - numerous - scattered on bilateral forearms    Reassured benign    2. Seborrheic keratosis, non irritated - several scattered on the back    Educated on the etiology    Reassured benign    No further intervention required. Patient to report changes.     Recommended moisturizing the skin to alleviate possible itchiness    CC Dr. Singleton on close of this encounter.  Follow-up prn for new or changing lesions.      Staff Involved:  Staff/Scribe    Scribe Disclosure:  I, Asaf Langley, am serving as a scribe to document services personally performed by Cande Chaves PA-C, based on data collection and the provider's statements to me.     Provider Disclosure:   The documentation recorded by the scribe accurately reflects the services I personally performed and the decisions made by me.    All risks, benefits and alternatives were discussed with patient.  Patient is in agreement and understands the assessment and plan.  All questions were answered.    Cande Chaves PA-C  [facility]: Phone: [phone], Fax: [phone]

## 2019-08-10 NOTE — NURSING NOTE
Dermatology Rooming Note    Anca Alanis's goals for this visit include:   Chief Complaint   Patient presents with     Derm Problem     Anca is here today to be seen for a rash on her back.        MIREYA Elliott

## 2019-08-26 ENCOUNTER — TELEPHONE (OUTPATIENT)
Dept: FAMILY MEDICINE | Facility: CLINIC | Age: 84
End: 2019-08-26

## 2019-08-26 DIAGNOSIS — G30.1 LATE ONSET ALZHEIMER'S DISEASE WITHOUT BEHAVIORAL DISTURBANCE (H): Primary | ICD-10-CM

## 2019-08-26 DIAGNOSIS — F02.80 LATE ONSET ALZHEIMER'S DISEASE WITHOUT BEHAVIORAL DISTURBANCE (H): Primary | ICD-10-CM

## 2019-08-26 NOTE — TELEPHONE ENCOUNTER
[facility] phone call message- medication clarification/question:    Full Medication Name: all   Dose: varies    Question/Clarification needed: Alvarez, home care nurse, calling to advise that he does weekly med set up for patient and they are divided into AM and PM. Alvarez has noticed that patient takes her AM meds, but doesn't always take her PM meds. Alvarez inquiring if she could take them once/day, as this seems to work better for her due to her memory issues. Please fax med change order to [phone], attention Alvarez.    Pharmacy confirmed as   [id number] [address] [address]  Phone: [phone] Fax: [phone]  : Yes    Please leave ONLY preferred pharmacy    OK to leave a message on voice mail? Yes    Advised patient that RN would call back within 3 hours, unless emergent.    Primary language: English      needed? No    Call taken on August 26, 2019 at 1:53 PM by Debra Diaz    Route to [facility]

## 2019-08-26 NOTE — LETTER
To whom it may concern at [facility]:    Patient is to begin taking:   memantine XR (NAMENDA XR) 14 MG 24 hr capsule 90 capsule 1 9/23/2019  --   Sig - Route: Take 1 capsule (14 mg) by mouth daily      Patient is to discontinue:  memantine (NAMENDA) 5 MG tablet (Discontinued) 180 tablet 1 7/8/2019 9/23/2019 No   Sig - Route: Take 1 tablet (5 mg) by mouth 2 times daily for memory     Please contact us if you have any questions or concerns.    Thank you,  Dr. Boone Richards/ Dr. Isaiah Smith (catia CATHERINE)

## 2019-08-27 NOTE — TELEPHONE ENCOUNTER
"Per PCP, \"The issue here is her memantine (Namenda).  Generic memantine is dosed twice a day and is likely covered by her part D plan.  There is a brand name extended, once/day form of memantine called Namenda XR.  Can you find out if her insurance would cover the brand name Namenda XR?  If so I could prescribe that and it would be once/day. \"    RN connected with Kati and Kristal, . Kristal is willing to assist, routing over to her.  Debi Ramírez, DARON   "

## 2019-08-28 NOTE — TELEPHONE ENCOUNTER
After speaking to pt's insurance about extended release versions of memantine(namenda):    Generic extended release is covered, name brand extended release is not. Pt will have a $10 copay. I was unable to ask about specific dosing coverage since that was not included, but the customer  ran the most common dosages and all of them were covered in the generic extended release version

## 2019-09-23 RX ORDER — MEMANTINE HYDROCHLORIDE 14 MG/1
14 CAPSULE, EXTENDED RELEASE ORAL DAILY
Qty: 90 CAPSULE | Refills: 1 | Status: SHIPPED | OUTPATIENT
Start: 2019-09-23 | End: 2020-04-10

## 2019-09-24 NOTE — TELEPHONE ENCOUNTER
"Per provider, \"I sent in a prescription for the extended release memantine, to be taken once per day.  She should stop the regular memantine which was being taken twice a day.  Please inform Alvarez the pt's home nurse. \"  "

## 2019-09-24 NOTE — TELEPHONE ENCOUNTER
RN contacted him and gave him the verbal order. He requested that we send a written order. RN to create one for provider to sign on Wednesday. To be faxed to [facility] attn: Alvarez [phone].  Debi Ramírez RN

## 2019-10-02 ENCOUNTER — DOCUMENTATION ONLY (OUTPATIENT)
Dept: FAMILY MEDICINE | Facility: CLINIC | Age: 84
End: 2019-10-02

## 2019-10-02 NOTE — PROGRESS NOTES
"When opening a documentation only encounter, be sure to enter in \"Chief Complaint\" Forms and in \" Comments\" Title of form, description if needed.    Anca is a 89 year old  female  Form received via: Fax  Form now resides in: Provider Ready    Tala Pereyra CMA                Form has been completed by provider.     Form sent out via: Fax to [phone]  Patient informed: No, Reason: fax confirmed  Output date: October 2, 2019    Tala Pereyra CMA      **Please close the encounter**      "

## 2019-11-04 DIAGNOSIS — G30.1 LATE ONSET ALZHEIMER'S DISEASE WITHOUT BEHAVIORAL DISTURBANCE (H): ICD-10-CM

## 2019-11-04 DIAGNOSIS — F02.80 LATE ONSET ALZHEIMER'S DISEASE WITHOUT BEHAVIORAL DISTURBANCE (H): ICD-10-CM

## 2019-11-04 RX ORDER — DONEPEZIL HYDROCHLORIDE 5 MG/1
5 TABLET, FILM COATED ORAL AT BEDTIME
Qty: 90 TABLET | Refills: 1 | Status: SHIPPED | OUTPATIENT
Start: 2019-11-04 | End: 2020-03-04

## 2019-11-04 NOTE — TELEPHONE ENCOUNTER
"Request for medication refill: Donepezil 5mg tabs    Providers if patient needs an appointment and you are willing to give a one month supply please refill for one month and  send a letter/MyChart using \".SMILLIMITEDREFILL\" .smillimited and route chart to \"P SMI \" (Giving one month refill in non controlled medications is strongly recommended before denial)    If refill has been denied, meaning absolutely no refills without visit, please complete the smart phrase \".smirxrefuse\" and route it to the \"P SMI MED REFILLS\"  pool to inform the patient and the pharmacy.    Ada Gross CMA        "

## 2019-11-22 ENCOUNTER — TELEPHONE (OUTPATIENT)
Dept: FAMILY MEDICINE | Facility: CLINIC | Age: 84
End: 2019-11-22

## 2019-11-22 NOTE — TELEPHONE ENCOUNTER
[facility] phone call message- medication clarification/question:    Full Medication Name: donepezil (ARICEPT) 5 MG tablet    Question/Clarification needed: deepthi home care nurse calling has notiiced patient she  Is taking morning time medication  she suppose take bed time she forget   Home care musing want know if its ok verbal order would you please call    Pharmacy confirmed as   [id number] [address] [address]  Phone: [phone] Fax: [phone]  : Yes    Please leave ONLY preferred pharmacy    OK to leave a message on voice mail? Yes    Advised patient that RN would call back within 3 hours, unless emergent.    Primary language: English      needed? No    Call taken on November 22, 2019 at 4:54 PM by Homero Downey to [facility]

## 2019-11-25 NOTE — TELEPHONE ENCOUNTER
RN contacted home care nurse to get more information. Patient takes all of her other medications in the morning and forgets to take anything at night, so she has been taking her Aricept in the morning instead. RN gave the okay for her to do this since it is better taken in the morning than not at all, routing to PCP as an FYI.  Debi Ramírez RN

## 2019-12-04 ENCOUNTER — DOCUMENTATION ONLY (OUTPATIENT)
Dept: FAMILY MEDICINE | Facility: CLINIC | Age: 84
End: 2019-12-04

## 2019-12-04 NOTE — PROGRESS NOTES
"When opening a documentation only encounter, be sure to enter in \"Chief Complaint\" Forms and in \" Comments\" Title of form, description if needed.    Anca is a 89 year old  female  Form received via: Fax  Form now resides in: Provider Ready    Tala Pereyra CMA                Form has been completed by provider.     Form sent out via: Fax to [phone]  Patient informed: No, Reason: fax confirmed  Output date: December 5, 2019    Tala Pereyra CMA      **Please close the encounter**      "

## 2019-12-20 ENCOUNTER — TELEPHONE (OUTPATIENT)
Dept: FAMILY MEDICINE | Facility: CLINIC | Age: 84
End: 2019-12-20

## 2019-12-20 NOTE — TELEPHONE ENCOUNTER
RN contacted home care nurse and got clarification on the issue with the medication. Patient has memory loss. Home care nurse comes and sets up all the pills for the week, and Anca is supposed to take Fosamax when home care nurse is there, so that it is taken before a meal, and she sits up for the 60 minutes before. Home care nurse notices that it is gone before she gets there, so patient could be taking it on a totally different day at an unknown time, which would not make this effective.     Home care nurse is inquiring if this medication is needed, with her advanced age, confusion, and no recent falls. If deemed necessary to be on something for osteoporosis, she is inqiuring if there is an alternate medication that she could be on, such as an injectable that home care could administer for her.  Debi Ramírez RN

## 2019-12-20 NOTE — TELEPHONE ENCOUNTER
[facility] phone call message- medication clarification/question:    Full Medication Name: alendronate (FOSAMAX) 70 MG tablet    Question/Clarification needed: Estela Servin home care nurse calling in to say that patient has hard time taking the medication. The above medication is supposed to take before meal however patient has already taken the medication when the nurse reaches there. Some times she forgets to the take the other medication. Checking if there are any alternate injections for the medications. Requesting a call back.    Pharmacy confirmed as   [id number] [place] [id number]  Phone: [phone] Fax: [phone]  : Yes    Please leave ONLY preferred pharmacy    OK to leave a message on voice mail? Yes    Advised patient that RN would call back within 3 hours, unless emergent.    Primary language: English      needed? No    Call taken on December 20, 2019 at 11:28 AM by Awilda Acevedo    Route to [facility]

## 2019-12-30 NOTE — TELEPHONE ENCOUNTER
"Per PCP, \"Keep her on the same medication. \"    RN contacted home care nurse to relay.  Debi Ramírez RN   "

## 2020-01-16 ENCOUNTER — DOCUMENTATION ONLY (OUTPATIENT)
Dept: FAMILY MEDICINE | Facility: CLINIC | Age: 85
End: 2020-01-16

## 2020-01-16 NOTE — PROGRESS NOTES
"When opening a documentation only encounter, be sure to enter in \"Chief Complaint\" Forms and in \" Comments\" Title of form, description if needed.    Anca is a 89 year old  female  Form received via: Fax  Form now resides in: Provider Ready    Ada Gross CMA              Form has been completed by provider.     Form sent out via: Fax to [employer]- January 2020 physicians order sheet at Fax Number: [phone]  Patient informed: NA   Output date: January 24, 2020    Georgia Peña CMA      **Please close the encounter**      "

## 2020-03-04 ENCOUNTER — OFFICE VISIT (OUTPATIENT)
Dept: FAMILY MEDICINE | Facility: CLINIC | Age: 85
End: 2020-03-04
Payer: MEDICARE

## 2020-03-04 VITALS
HEIGHT: 63 IN | SYSTOLIC BLOOD PRESSURE: 142 MMHG | RESPIRATION RATE: 16 BRPM | WEIGHT: 101.6 LBS | HEART RATE: 76 BPM | TEMPERATURE: 97.9 F | OXYGEN SATURATION: 95 % | DIASTOLIC BLOOD PRESSURE: 84 MMHG | BODY MASS INDEX: 18 KG/M2

## 2020-03-04 DIAGNOSIS — F02.80 LATE ONSET ALZHEIMER'S DISEASE WITHOUT BEHAVIORAL DISTURBANCE (H): ICD-10-CM

## 2020-03-04 DIAGNOSIS — L82.0 INFLAMED SEBORRHEIC KERATOSIS: ICD-10-CM

## 2020-03-04 DIAGNOSIS — Z00.00 ENCOUNTER FOR MEDICARE ANNUAL WELLNESS EXAM: Primary | ICD-10-CM

## 2020-03-04 DIAGNOSIS — G30.1 LATE ONSET ALZHEIMER'S DISEASE WITHOUT BEHAVIORAL DISTURBANCE (H): ICD-10-CM

## 2020-03-04 DIAGNOSIS — Z23 ENCOUNTER FOR IMMUNIZATION: ICD-10-CM

## 2020-03-04 DIAGNOSIS — R01.1 HEART MURMUR: ICD-10-CM

## 2020-03-04 ASSESSMENT — ANXIETY QUESTIONNAIRES
1. FEELING NERVOUS, ANXIOUS, OR ON EDGE: NOT AT ALL
6. BECOMING EASILY ANNOYED OR IRRITABLE: NOT AT ALL
2. NOT BEING ABLE TO STOP OR CONTROL WORRYING: NOT AT ALL
GAD7 TOTAL SCORE: 0
3. WORRYING TOO MUCH ABOUT DIFFERENT THINGS: NOT AT ALL
5. BEING SO RESTLESS THAT IT IS HARD TO SIT STILL: NOT AT ALL
IF YOU CHECKED OFF ANY PROBLEMS ON THIS QUESTIONNAIRE, HOW DIFFICULT HAVE THESE PROBLEMS MADE IT FOR YOU TO DO YOUR WORK, TAKE CARE OF THINGS AT HOME, OR GET ALONG WITH OTHER PEOPLE: NOT DIFFICULT AT ALL
7. FEELING AFRAID AS IF SOMETHING AWFUL MIGHT HAPPEN: NOT AT ALL

## 2020-03-04 ASSESSMENT — PATIENT HEALTH QUESTIONNAIRE - PHQ9: 5. POOR APPETITE OR OVEREATING: NOT AT ALL

## 2020-03-04 ASSESSMENT — MIFFLIN-ST. JEOR: SCORE: 818.59

## 2020-03-04 ASSESSMENT — PAIN SCALES - GENERAL: PAINLEVEL: NO PAIN (0)

## 2020-03-04 NOTE — PROGRESS NOTES
>65 year old Wellness Visit ( Medicare etc)         HPI       This 89 year old female presents as an established patient  Rafat Richards who presents for a  Annual Wellness Exam.    Other issues patient wants to address today:    yes, lesions on R hand dorsum and L forearm.          Visual Acuity: :  Testing not done; patient has seen eye doctor in the past 12 months.    Patient Active Problem List   Diagnosis     Anxiety state     Backache     Dysthymic disorder     Hearing loss     Spinal stenosis, lumbar region, without neurogenic claudication     Osteoarthritis of multiple joints     Osteoporosis     Scoliosis (and kyphoscoliosis), idiopathic     Health Care Home     Sleep disorder     Moderate stage glaucoma - Left Eye     Senile cataracts of both eyes     Late onset Alzheimer's disease without behavioral disturbance (H)     Abdominal pain     Anal fissure       Past Medical History:   Diagnosis Date     Cataracts, both eyes      Glaucoma suspect         Family History   Problem Relation Age of Onset     Hypertension Sister      Diabetes Paternal Grandmother      Diabetes Son          Problem List, Family History and past Medical History reviewed and unchanged/updated.       Health risk Assessment/ Review of Systems:         Constitutional:   Fevers or night sweats?  NO      Eyes:   Vision problems?  NO            Hearing:  Do you feel you have hearing loss? yes  Cardiovascular:  Chest pain, palpitations, or pain with walking?  NO        Respiratory:   Breathing problems or cough?  NO    :   Difficulty controlling urination?  NO        Musculoskeletal:   Stiffness or sore joints? Yes            Skin:   concerning lesions or moles?  Yes         Nervous System:   loss of strength or sensation,  numbness or tingling,  tremor,  dizziness,  headache?  NO         Mental Health:   depression or anxiety,  sleep problems?  NO   Cognition:  Memory problems?Yes      Weight Loss: Have you lost 10 or more pounds  unintentionally in the previous year?  NO  Energy: How much of the time during the past 3 weeks did you feel tired? Yes   (check one of the following):   ?  All of the time  ? Most of the time  ? Some of the time  ? A little of the time  ? None of the Time    PHQ-2 Score:   PHQ-2 ( 1999 [employer]) 3/4/2020 4/24/2019   Q1: Little interest or pleasure in doing things 0 0   Q2: Feeling down, depressed or hopeless 0 0   PHQ-2 Score 0 0       PHQ-9 Score:   [facility] Follow-up to PHQ 6/29/2016   PHQ-9 9. Suicide Ideation past 2 weeks Not at all     Medical Care:     What other specialists or organizations are involved in your medical care?     Patient Care Team       Relationship Specialty Notifications Start End    Rafat Richards MD PCP - General Family Practice  4/15/11     Phone: [phone] Fax: [phone]         [address] [address]    [facility] ([facility] ([place])  4/22/18     Phone: [phone]         Shivam Bean MD MD Dermatology  1/14/19     Phone: [phone] Fax: [phone]         [address] [place] [address]    Gallo Johnson MD MD Ophthalmology  6/4/19     Phone: [phone] Fax: [phone]         [address] [facility] [address]          Do you have an advanced directive? Yes      Social History / Home Safety     Social History     Tobacco Use     Smoking status: Never Smoker     Smokeless tobacco: Never Used   Substance Use Topics     Alcohol use: No     Marital Status:  Who lives in your household? 1  Does your home have any of the following safety concerns? Loose rugs in the hallway, no grab bars in the bathroom, no handrails on the stairs or have poorly lit areas?  No   Do you feel threatened or controlled by a partner, ex-partner or anyone in your life? {Yes No   Has anyone hurt you physically, for example by pushing, hitting, slapping or kicking you   or forcing you to have sex? No       Functional  Status     Do you need help with dressing yourself, bathing, or walking?No   Do you need help with the phone, transportation, shopping, preparing meals, housework, laundry, medications or managing money? YES Life skills,Home Care,Medication Set up, PCA  By yourself and not using aids, do you have any difficulty walking up 10 steps  without resting? No   By yourself and not using aids, do you have any difficulty walking several hundred yards?  YES walker             Risk Behaviors and Healthy Habits     History   Smoking Status     Never Smoker   Smokeless Tobacco     Never Used     How many servings of fruits and vegetables do you eat a day? 2  Exercise:walking  and  4-5 days/week for an average of 30-45 minutes  Do you frequently drive without a seatbelt? No   History   Smoking Status     Never Smoker   Smokeless Tobacco     Never Used     Do you use any other drugs? No       Do you use alcohol?Yes  Number of drinks per day 1  Number of drinking days a week 0-1      Functional Ability   Was the patient's timed Up & Go test (Get up from chair walk, 10 feet turn, return to chair and sit down) unsteady or longer than 10 seconds?  YES mild unsteady gait    Fall risk:     1. Have you fallen two or more times in the past year? No   2. Have you fallen and had an injury in the past year?  No         Evaulation of Cognitive Function     Family member/caregiver input: Normal  Not appropriate due to known dementia          Other Assessments:     CV Risk based on Pooled Cohort Risk (consider assessing every 4-6 years; consider statin in patients with 10-yr risk > 7.5%):   The ASCVD Risk score (Emma BELGICA Jr., et al., 2013) failed to calculate for the following reasons:    The 2013 ASCVD risk score is only valid for ages 40 to 79    Advance Directives: Discussed with patient and family as appropriate.  Has patient completed advance directives? Yes, advance care planning is on file.              Immunization History  "  Administered Date(s) Administered     Influenza (High Dose) 3 valent vaccine 10/07/2015     Influenza (IIV3) PF 11/01/2004, 10/01/2008, 10/14/2009, 10/06/2010, 10/05/2011, 10/09/2013     Pneumo Conj 13-V (2010&after) 01/14/2015     Pneumococcal 23 valent 02/24/2004     TD (ADULT, 7+) 03/04/2009     Reviewed Immunization Record Today    Physical Exam     Vitals: BP (!) 142/84   Pulse 76   Temp 97.9  F (36.6  C) (Oral)   Resp 16   Ht 1.59 m (5' 2.6\")   Wt 46.1 kg (101 lb 9.6 oz)   LMP  (LMP Unknown)   SpO2 95%   Breastfeeding No   BMI 18.23 kg/m    BMI= Body mass index is 18.23 kg/m .  GENERAL APPEARANCE: alert and no distress  HENT: ear canals patent and TM's normal; mouth without ulcers or lesions; and oral mucous membranes moist  Hearing loss screen:   Normal   DENTITION:  Good repair?  yes  RESP: lungs clear to auscultation - no rales, rhonchi or wheezes  CV: 2/6 systolic murmur along the left sternal border, no click or rub, no peripheral edema and peripheral pulses strong  SKIN: Seborrheic keratosis on dorsum of right hand and along anterolateral surface of left forearm.  Noninflamed.  NEURO: Normal strength and tone  MENTAL STATUS EXAM  Appearance: appropriate  Attitude: cooperative  Behavior: normal  Eye Contact: normal  Speech: normal  Orientation: oreinted to person , place, situation  Mood:  bright  Affect: Mood Congruent  Thought Process: + impaired memory        Assessment and Plan         Welcome to Medicare Preventive Visit / Initial Medicare Annual Wellness Exam - reviewed Preventive Services and Plan form with patient as specified in Patient Instructions.    Anca was seen today for physical.    Diagnoses and all orders for this visit:    Encounter for Medicare annual wellness exam    Late onset Alzheimer's disease without behavioral disturbance (H).  Stable.  Her care coordinator is inquiring whether meds can be further simplified.  She has completed 6 years of alendronate therapy and we " will stop this.  We will also stop donepezil due to the later stage of her dementia.    Inflamed seborrheic keratosis.  The patient desired to have these lesions removed.  They were frozen x3 with liquid nitrogen and the patient tolerated the procedure well.  -     DESTRUCT BENIGN LESION, UP TO 14    Encounter for immunization  -     TDAP VACCINE (BOOSTRIX)    Heart murmur.  Looking back on old records, this murmur appears to be new and more likely represents mitral regurgitation.  She has no symptoms of exercise intolerance or heart failure.  Discussing the situation with the patient, her , and the care coordinator, all of us agreed that we would observe it and consider an echocardiogram should she develop exercise intolerance.          Options for treatment and follow-up care were reviewed with the Anca Alanis and/or guardian engaged in the decision making process and verbalized understanding of the options discussed and agreed with the final plan.    Rafat Richards MD

## 2020-03-04 NOTE — PATIENT INSTRUCTIONS
ORDERS:    STOP Alendronate  STOP  Donepezil  OK to get calcium and vitamin D in multivitamin        Rafat Richards MD    [facility]ERSON[facility]VENT[facility] SERVICES PLAN - SERVICES     Review these tests with your doctor then decide which ones you want and take this page home for your reference       IMMUNIZATIONS Description Recommend today?     Influenza (flu shot) Helps to prevent flu; you should get it every year No; is up to date.   PCV 13 Prevents pneumonia; given once No; is up to date.   PPSV 23 Prevents pneumonia; given once No; is up to date.   Tetanus Prevents tetanus; need every 10 years Yes; Recommended and ordered.   Herpes Zoster (shingles) Prevents or lessens symptoms from shingles; given once No: is not indicated today.   Hepatitis B  If you have any of the following risk factors you should be immunized for hepatitis B: severe kidney disease, people who live in the same house as a carrier of Hepatitis B virus, people who live in  institutions (e.g. nursing homes or group homes), homosexual men, patients with hemophilia who received Factor VIII or IX concentrates, abusers of illicit injectable drugs No: is not indicated today.             SCREENING TESTS     Description   Year of Last Screening   Recommended Today?   Heart disease screening blood tests    Cholesterol level Reducing cholesterol can reduce your risk of heart attacks by 25%.  Screening is recommended yearly if you are at risk of heart disease otherwise every 4-5 years  No: is not indicated today.   Diabetes screening tests    Hemoglobin A1c blood test   Finding and treating diabetes early can reduce complications.  Screening recommended/covered yearly if you have high blood pressure, high cholesterol, obesity (BMI >30), or a history of high blood glucose tests; or 2 of the following: family history of diabetes, overweight (BMI >25 but <30), age 65 years or older, and a history of diabetes of pregnancy or gave birth to baby weighing  more than 9 lbs.  No: is not indicated today.   Hepatitis B screening Finding hepatitis B early can reduce complications.  Screening is recommended for persons with selected risk factors.  No: is not indicated today.   Hepatitis C screening Finding hepatitis C early can reduce complications.  Screening is recommended for all persons born from 1945 through 1965 and for those with selected other risk factors.   No: is not indicated today.   HIV screening Finding HIV early can reduce complications.  Screening is recommended for persons with risk factors for HIV infection.  No: is not indicated today.   Glaucoma screening Early detection of glaucoma can prevent blindness.   Please talk to your eye doctor about this.           SCREENING TESTS     Description   Year of Last Screening   Recommended Today?   Colorectal cancer screening    Fecal occult blood test     Screening colonoscopy Screening for colon cancer has been shown to reduce death from colon cancer by 25-30%. Screening recommended to start at 50 years and continuing until age 75 years.    No: is not indicated today.   Breast Cancer Screening (women)    Mammogram Mammogram screening for breast cancer has been shown to reduce the risk of dying from breast cancer and prolong life. Screening is recommended every 1-2 years for women aged 50 to 74 years.   No: is not indicated today.   Cervical Cancer screening (women)    Pap Cervical pap smears can reduce cervical cancer. Screening is recommended annually if high risk (history of abnormal pap smears) otherwise every 2-3 years, stop screening at 65 years of age if history of normal paps.  No: is not indicated today.   Screening for Osteoporosis:  Bone mass measurements (women)    Dexa Scan Screening and treating Osteoporosis can reduce the risk of hip and spine fractures. Screening is recommended in women 65 years or older and in women and men at risk of osteoporosis.  No: is not indicated today.   Screening for  Lung Cancer     Low-dose CT scanning Screening can reduce mortality in persons aged 55-80 who have smoked at least 30 pack-years and who are either still smoking or have quit in the past 15 years.  No: is not indicated today.   Abdominal Aortic Aneurysm (AAA) screening    Ultrasound (US)   An aneurysm treated before rupture is very safe -a ruptured aneurysm can be fatal.  Screening  by US for AAA is limited to patients who meet one of the following criteria:    Men who are 65-75 years old and have smoked more than 100 cigarettes in their lifetime    Anyone with a family history of abdominal aortic aneurysm  No: is not indicated today.       MEDICARE WELLNESS EXAM PATIENT INSTRUCTIONS    Yearly exam:     See your health care provider every year in order to review changes in your health, review medicines that you take, and discuss preventive care needs such as immunizations and cancer screening.    Get a flu shot each year.     Advance Directive:    If you have not done so, you are encouraged to complete an advance directive. If you would like support with this, please contact the clinic  through the main clinic line. More information about advance directives can be found at:     [email or web address]    Nutrition:     Eat at least 5 servings of fruits and vegetables each day.     Eat whole-grain bread, whole-wheat pasta and brown rice instead of white grains and rice.     Talk to your doctor about Calcium and Vitamin D.     Lifestyle:    Exercise for at least 150 minutes a week (30 minutes a day, 5 days a week). This will help you control your weight and prevent disease.     Limit alcohol to one drink per day.     If you smoke, try to quit - your doctor will be happy to help.     Wear sunscreen to prevent skin cancer.     See your dentist every six months for an exam and cleaning.     See your eye doctor every 1 to 2 years to screen for conditions such as  glaucoma, macular degeneration and cataracts.      Personalized Prevention Plan  You are due for the preventive services outlined below.  Your care team is available to assist you in scheduling these services.  If you have already completed any of these items, please share that information with your care team to update in your medical record.  Health Maintenance Due   Topic Date Due     Depression Action Plan  05/02/1924     Zoster (Shingles) Vaccine (1 of 2) 05/02/1974     Annual Wellness Visit  05/02/1989     Depression Assessment  07/29/2016     Discuss Advance Care Planning  12/11/2017     Diptheria Tetanus Pertussis (DTAP/TDAP/TD) Vaccine (2 - Td) 03/04/2019     Flu Vaccine (1) 09/01/2019

## 2020-03-11 ASSESSMENT — PATIENT HEALTH QUESTIONNAIRE - PHQ9: SUM OF ALL RESPONSES TO PHQ QUESTIONS 1-9: 3

## 2020-03-12 ASSESSMENT — ANXIETY QUESTIONNAIRES: GAD7 TOTAL SCORE: 0

## 2020-04-10 DIAGNOSIS — G30.1 LATE ONSET ALZHEIMER'S DISEASE WITHOUT BEHAVIORAL DISTURBANCE (H): ICD-10-CM

## 2020-04-10 DIAGNOSIS — F02.80 LATE ONSET ALZHEIMER'S DISEASE WITHOUT BEHAVIORAL DISTURBANCE (H): ICD-10-CM

## 2020-04-10 RX ORDER — MEMANTINE HYDROCHLORIDE 14 MG/1
14 CAPSULE, EXTENDED RELEASE ORAL DAILY
Qty: 90 CAPSULE | Refills: 1 | Status: SHIPPED | OUTPATIENT
Start: 2020-04-10 | End: 2020-09-15

## 2020-04-10 NOTE — TELEPHONE ENCOUNTER
"Request for medication refill: memantine XR (NAMENDA XR) 14 MG 24 hr capsule    Providers if patient needs an appointment and you are willing to give a one month supply please refill for one month and  send a letter/MyChart using \".SMILLIMITEDREFILL\" .smillimited and route chart to \"[facility] \" (Giving one month refill in non controlled medications is strongly recommended before denial)    If refill has been denied, meaning absolutely no refills without visit, please complete the smart phrase \".smirxrefuse\" and route it to the \"[facility] MED REFILLS\"  pool to inform the patient and the pharmacy.    Ada Gross CMA        "

## 2020-06-17 ENCOUNTER — DOCUMENTATION ONLY (OUTPATIENT)
Dept: FAMILY MEDICINE | Facility: CLINIC | Age: 85
End: 2020-06-17

## 2020-06-17 NOTE — PROGRESS NOTES
"When opening a documentation only encounter, be sure to enter in \"Chief Complaint\" Forms and in \" Comments\" Title of form, description if needed.    Anca is a 89 year old  female  Form received via: Fax  Form now resides in: Provider Ready    Mel Lorenzo CMA            After provider completed form, I realized there is no fax number on form. Will hold on to see if we get a new form for this pt.       Mel Lorenzo CMA on 6/22/2020 at 3:59 PM       "

## 2020-06-22 RX ORDER — ZOLPIDEM TARTRATE 5 MG/1
5 TABLET ORAL
OUTPATIENT
Start: 2020-06-22

## 2020-06-22 NOTE — TELEPHONE ENCOUNTER
"Request for medication refill: Zolpidem     Providers if patient needs an appointment and you are willing to give a one month supply please refill for one month and  send a letter/MyChart using \".SMILLIMITEDREFILL\" .smillimited and route chart to \"P [facility] \" (Giving one month refill in non controlled medications is strongly recommended before denial)    If refill has been denied, meaning absolutely no refills without visit, please complete the smart phrase \".smirxrefuse\" and route it to the \"P [facility] MED REFILLS\"  pool to inform the patient and the pharmacy.    Daphnie Fermin, CMA        "

## 2020-06-24 NOTE — PROGRESS NOTES
Form has been completed by provider.      Form sent out via: Fax to [employer]- January 2020 physicians order sheet at Fax Number: [phone]  Patient informed: NA Output date: June 24, 2020    Sarai Adams      **Please close the encounter**

## 2020-09-15 DIAGNOSIS — F02.80 LATE ONSET ALZHEIMER'S DISEASE WITHOUT BEHAVIORAL DISTURBANCE (H): ICD-10-CM

## 2020-09-15 DIAGNOSIS — G30.1 LATE ONSET ALZHEIMER'S DISEASE WITHOUT BEHAVIORAL DISTURBANCE (H): ICD-10-CM

## 2020-09-15 RX ORDER — MEMANTINE HYDROCHLORIDE 14 MG/1
14 CAPSULE, EXTENDED RELEASE ORAL DAILY
Qty: 90 CAPSULE | Refills: 1 | Status: SHIPPED | OUTPATIENT
Start: 2020-09-15 | End: 2021-05-07

## 2020-09-15 NOTE — TELEPHONE ENCOUNTER

## 2020-10-01 ENCOUNTER — TRANSFERRED RECORDS (OUTPATIENT)
Dept: HEALTH INFORMATION MANAGEMENT | Facility: CLINIC | Age: 85
End: 2020-10-01

## 2020-10-12 ENCOUNTER — TRANSFERRED RECORDS (OUTPATIENT)
Dept: HEALTH INFORMATION MANAGEMENT | Facility: CLINIC | Age: 85
End: 2020-10-12

## 2020-10-14 DIAGNOSIS — C06.9 SQUAMOUS CELL CARCINOMA OF ORAL CAVITY (H): Primary | ICD-10-CM

## 2020-10-14 NOTE — PROGRESS NOTES
I received a telephone call from patient's son Alexei Alanis.  Patient has developed an lesion on the right posterior maxilla and palate that was biopsied on October 1 by Dr. Blaze Burgos DDS.  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    The patient has moderate stage Alzheimer's disease.  Her 2 sons live in [place] and are primary designated decision makers.  She is living in an assisted living facility and receives services from [facility], mostly care coordination.    It was recommended that she consult with a head and neck surgeon and I am referring the patient to Dr. Silverio Vilallobos for assessment and therapeutic recommendations given her comorbidities.  Aside from dementia, she has no known heart, lung, or kidney disease.

## 2020-10-14 NOTE — Clinical Note
Silverio, please see my note accompanying this referral request.  I very much appreciate your input.  Don't hesitate to call my cell [phone] if you have any questions.  [place]

## 2020-10-15 ENCOUNTER — TELEPHONE (OUTPATIENT)
Dept: OTOLARYNGOLOGY | Facility: CLINIC | Age: 85
End: 2020-10-15

## 2020-10-15 DIAGNOSIS — C06.9 SQUAMOUS CELL CARCINOMA OF ORAL CAVITY (H): Primary | ICD-10-CM

## 2020-10-15 NOTE — TELEPHONE ENCOUNTER
[facility] Call Center    Phone Message    May a detailed message be left on voicemail: yes     Reason for Call: Other:   Pt referred by Rafat Richards. Dx: Squamous cell carcinoma of oral cavity     Please call Adelia back to help schedule pt.     Action Taken: Other:   ent    Travel Screening: Not Applicable

## 2020-10-16 ENCOUNTER — TELEPHONE (OUTPATIENT)
Dept: OTOLARYNGOLOGY | Facility: CLINIC | Age: 85
End: 2020-10-16

## 2020-10-16 NOTE — TELEPHONE ENCOUNTER
FUTURE VISIT INFORMATION      FUTURE VISIT INFORMATION:    Date: 10/21/2020    Time: 3PM    [place] [facility] [place]ERRAL INFORMATION:    Referring provider:  Rafat Richards MD    Referring providers clinic:  [facility]    Reason for visit/diagnosis  Squamous cell carcinoma of oral cavity - Referred by Rafat Richards MD in [facility]    RECORDS REQUESTED FROM:       Clinic name Comments Records Status Imaging Status   [facility] 10/14/2020 referral from Dr Richards [facility]     Blaze Burgos DDS  [place] [id number]  [place] [address]   [phone]  Fx. [phone] 10/1/2020 note from  req 10/16/2020 - sent to scan     Imaging 3/5/2010 MRI [facility]  [facility], [address] [place] [id number] ([phone])  7/1/2019  Skin, posterior neck  (Specimen #: [id number] ) [facility] Oral pathology    10/1/2020 maxilla Biopsy (Case: [id number])  Report sent to scan 10/16    Path req to be transfer 10/20              10/16/2020 12:21PM sent a fax to [facility] for path report and recs - Amay   *recs received, path will be requested on Tuesday - Amay   10/20/2020 9:15AM sent a fax to Oral path with consult form to transfer path - Amay

## 2020-10-16 NOTE — TELEPHONE ENCOUNTER
[facility] Call Center    Phone Message    May a detailed message be left on voicemail: yes     Reason for Call: Other: Pt daughter in law, La Nena, called in to check appt time. Was under the impression that she was scheduled from 10/21 with Dr Villalobos. No appt on file. See below TE for Dx.     Please reach out to [place]ena to update on scheduling at [phone]. Thank you    Action Taken: Message routed to:  [facility] ENT    Travel Screening: Not Applicable

## 2020-10-16 NOTE — TELEPHONE ENCOUNTER
Spoke with Michelle and verified time for CT appointment and consult with Dr. Villalobos. Provided address and parking information. Michelle expressed understanding.    Sharmila Rivera, EMT

## 2020-10-20 ENCOUNTER — ANCILLARY PROCEDURE (OUTPATIENT)
Dept: CT IMAGING | Facility: CLINIC | Age: 85
End: 2020-10-20
Attending: OTOLARYNGOLOGY
Payer: MEDICARE

## 2020-10-20 DIAGNOSIS — C06.9 SQUAMOUS CELL CARCINOMA OF ORAL CAVITY (H): ICD-10-CM

## 2020-10-20 LAB
CREAT BLD-MCNC: 0.9 MG/DL (ref 0.52–1.04)
GFR SERPL CREATININE-BSD FRML MDRD: 58 ML/MIN/{1.73_M2}

## 2020-10-20 PROCEDURE — 36415 COLL VENOUS BLD VENIPUNCTURE: CPT | Performed by: PATHOLOGY

## 2020-10-20 PROCEDURE — 70491 CT SOFT TISSUE NECK W/DYE: CPT | Performed by: RADIOLOGY

## 2020-10-20 PROCEDURE — 82565 ASSAY OF CREATININE: CPT | Performed by: PATHOLOGY

## 2020-10-20 RX ORDER — IOPAMIDOL 755 MG/ML
100 INJECTION, SOLUTION INTRAVASCULAR ONCE
Status: COMPLETED | OUTPATIENT
Start: 2020-10-20 | End: 2020-10-20

## 2020-10-20 RX ADMIN — IOPAMIDOL 100 ML: 755 INJECTION, SOLUTION INTRAVASCULAR at 08:58

## 2020-10-21 ENCOUNTER — PRE VISIT (OUTPATIENT)
Dept: OTOLARYNGOLOGY | Facility: CLINIC | Age: 85
End: 2020-10-21

## 2020-10-21 ENCOUNTER — OFFICE VISIT (OUTPATIENT)
Dept: OTOLARYNGOLOGY | Facility: CLINIC | Age: 85
End: 2020-10-21
Payer: MEDICARE

## 2020-10-21 VITALS
TEMPERATURE: 97.9 F | BODY MASS INDEX: 17.85 KG/M2 | SYSTOLIC BLOOD PRESSURE: 122 MMHG | WEIGHT: 97 LBS | OXYGEN SATURATION: 98 % | RESPIRATION RATE: 14 BRPM | DIASTOLIC BLOOD PRESSURE: 70 MMHG | HEART RATE: 62 BPM | HEIGHT: 62 IN

## 2020-10-21 DIAGNOSIS — C06.9 SQUAMOUS CELL CARCINOMA OF ORAL CAVITY (H): Primary | ICD-10-CM

## 2020-10-21 PROCEDURE — 99203 OFFICE O/P NEW LOW 30 MIN: CPT | Performed by: OTOLARYNGOLOGY

## 2020-10-21 ASSESSMENT — PAIN SCALES - GENERAL: PAINLEVEL: NO PAIN (0)

## 2020-10-21 ASSESSMENT — MIFFLIN-ST. JEOR: SCORE: 783.24

## 2020-10-21 NOTE — PROGRESS NOTES
October 21, 2020      Rafat Richards MD   [facility] [address]      Dear Dr. Richards:      Thank you for requesting consultation on Anca Alanis.  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  The patient is unsure how long she had it.  She says she is aware of it, but it is not frankly hurt and has not bled.  It was picked up in the dentist chair recently, and a biopsy was performed which revealed high-grade dysplasia with a focus of invasive carcinoma.  The patient is unsure about numbness in her palate.  She has no numbness in her cheek.  She has had no epistaxis and has not noticed any lumps or bumps in her neck.           Past Medical History:   Diagnosis Date     Cataracts, both eyes      Glaucoma suspect      No past surgical history on file.    Current Outpatient Medications:      acetaminophen (TYLENOL) 325 MG tablet, Take 2 tablets (650 mg) by mouth At Bedtime, Disp: 100 tablet, Rfl:      melatonin 3 MG tablet, Take 1 tablet (3 mg) by mouth At Bedtime, Disp: 90 tablet, Rfl: 3     memantine XR (NAMENDA XR) 14 MG 24 hr capsule, Take 1 capsule (14 mg) by mouth daily, Disp: 90 capsule, Rfl: 1     Multiple Vitamin (MULTIVITAMIN OR), Take 0.5 tablets by mouth daily , Disp: , Rfl:      hydrocortisone (ANUSOL-HC) 2.5 % cream, Place rectally 2 times daily as needed for hemorrhoids (Patient not taking: Reported on 10/21/2020), Disp: 30 g, Rfl: 1    Current Facility-Administered Medications:      lidocaine 1% with EPINEPHrine 1:100,000 injection 3 mL, 3 mL, Intradermal, Once, Chris Natarajan MD  Allergies   Allergen Reactions     Ciprofloxacin      Sulfa Drugs Nausea     Social History     Tobacco Use     Smoking status: Never Smoker     Smokeless tobacco: Never Used   Substance Use Topics     Alcohol use: No     Review Of Systems  Skin: negative  Eyes: negative  Ears/Nose/Throat: negative  Respiratory: No shortness  of breath, dyspnea on exertion, cough, or hemoptysis  Cardiovascular: negative  Gastrointestinal: negative  Genitourinary: negative  Musculoskeletal: negative  Neurologic: negative  Psychiatric: negative  Hematologic/Lymphatic/Immunologic: negative  Endocrine: negative    PHYSICAL EXAMINATION:  She is well-appearing, in no distress.  She certainly has hearing loss but seems lucid and seems to understand what I am telling her.  She is here today with her  and a caretaker.  Examination of the oral cavity reveals an obvious broadly based sessile exophytic lesion on the right hemipalate.  It starts at the level of the alveolus and approaches to about 5-8 mm away from the midline.  It comes up anteriorly to about the point of the canine.  I did take photographic evidence of this which is in the chart in the electronic medical record.  The remainder of the oral cavity is unremarkable.  Examination of the neck reveals no mass or lymphadenopathy.      IMAGING:  The patient had a CT scan at this point which does show thickening consistent with a lesion in the right hemipalate.  I should note that it appears all anterior to the soft palate.  There is some tissue density at the floor of the maxillary sinus and perhaps a bony dehiscence suggesting the possibility of tumor erosion through the floor of the maxillary sinus.      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      PLAN:   1.  I was told by the caretaker that I should speak with the patient's son to discuss how much they are willing to put the patient through to work this up and get it treated.  I counseled the support that was present that ordinarily this patient would be treated with an infrastructure maxillectomy and likely an obturator or free tissue reconstruction, but certainly all of those things will need to be carefully considered in a patient who is of her age and may have  limited ability to tolerate general anesthesia.   2.  I will speak with the son this week.   3.  We will present her at Tumor Board along with the CT scan and determine whether or not the family would like additional imaging and what that should be; whether it would be a PET scan or MRI.      Sincerely,   Silverio Villalobos M.D.        Otolaryngology-Head & Neck Surgery    [phone]

## 2020-10-21 NOTE — LETTER
10/21/2020       RE: Anca Alanis  [address]  [place] [address]     Dear Colleague,    Thank you for referring your patient, Anca Alanis, to the [facility] [place] [facility]. Please see a copy of my visit note below.      October 21, 2020      Rafat Richards MD   [facility] [address]      Dear Dr. Richards:      Thank you for requesting consultation on Anca Alanis.  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  The patient is unsure how long she had it.  She says she is aware of it, but it is not frankly hurt and has not bled.  It was picked up in the dentist chair recently, and a biopsy was performed which revealed high-grade dysplasia with a focus of invasive carcinoma.  The patient is unsure about numbness in her palate.  She has no numbness in her cheek.  She has had no epistaxis and has not noticed any lumps or bumps in her neck.           Past Medical History:   Diagnosis Date     Cataracts, both eyes      Glaucoma suspect      No past surgical history on file.    Current Outpatient Medications:      acetaminophen (TYLENOL) 325 MG tablet, Take 2 tablets (650 mg) by mouth At Bedtime, Disp: 100 tablet, Rfl:      melatonin 3 MG tablet, Take 1 tablet (3 mg) by mouth At Bedtime, Disp: 90 tablet, Rfl: 3     memantine XR (NAMENDA XR) 14 MG 24 hr capsule, Take 1 capsule (14 mg) by mouth daily, Disp: 90 capsule, Rfl: 1     Multiple Vitamin (MULTIVITAMIN OR), Take 0.5 tablets by mouth daily , Disp: , Rfl:      hydrocortisone (ANUSOL-HC) 2.5 % cream, Place rectally 2 times daily as needed for hemorrhoids (Patient not taking: Reported on 10/21/2020), Disp: 30 g, Rfl: 1    Current Facility-Administered Medications:      lidocaine 1% with EPINEPHrine 1:100,000 injection 3 mL, 3 mL, Intradermal, Once, Chris Natarajan,  MD  Allergies   Allergen Reactions     Ciprofloxacin      Sulfa Drugs Nausea     Social History     Tobacco Use     Smoking status: Never Smoker     Smokeless tobacco: Never Used   Substance Use Topics     Alcohol use: No     Review Of Systems  Skin: negative  Eyes: negative  Ears/Nose/Throat: negative  Respiratory: No shortness of breath, dyspnea on exertion, cough, or hemoptysis  Cardiovascular: negative  Gastrointestinal: negative  Genitourinary: negative  Musculoskeletal: negative  Neurologic: negative  Psychiatric: negative  Hematologic/Lymphatic/Immunologic: negative  Endocrine: negative    PHYSICAL EXAMINATION:  She is well-appearing, in no distress.  She certainly has hearing loss but seems lucid and seems to understand what I am telling her.  She is here today with her  and a caretaker.  Examination of the oral cavity reveals an obvious broadly based sessile exophytic lesion on the right hemipalate.  It starts at the level of the alveolus and approaches to about 5-8 mm away from the midline.  It comes up anteriorly to about the point of the canine.  I did take photographic evidence of this which is in the chart in the electronic medical record.  The remainder of the oral cavity is unremarkable.  Examination of the neck reveals no mass or lymphadenopathy.      IMAGING:  The patient had a CT scan at this point which does show thickening consistent with a lesion in the right hemipalate.  I should note that it appears all anterior to the soft palate.  There is some tissue density at the floor of the maxillary sinus and perhaps a bony dehiscence suggesting the possibility of tumor erosion through the floor of the maxillary sinus.      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      PLAN:   1.  I was told by the caretaker that I should speak with the patient's son to discuss how much they are willing to put the patient through to  work this up and get it treated.  I counseled the support that was present that ordinarily this patient would be treated with an infrastructure maxillectomy and likely an obturator or free tissue reconstruction, but certainly all of those things will need to be carefully considered in a patient who is of her age and may have limited ability to tolerate general anesthesia.   2.  I will speak with the son this week.   3.  We will present her at Tumor Board along with the CT scan and determine whether or not the family would like additional imaging and what that should be; whether it would be a PET scan or MRI.      Sincerely,   Silverio Villalobos M.D.        Otolaryngology-Head & Neck Surgery    [phone]

## 2020-10-21 NOTE — NURSING NOTE
"Chief Complaint   Patient presents with     Consult     Squamous cell carcinoma of oral cavity      Blood pressure 122/70, pulse 62, temperature 97.9  F (36.6  C), resp. rate 14, height 5' 2\" (157.5 cm), weight 97 lb (44 kg), SpO2 98 %, not currently breastfeeding.    Willie Tang LPN    "

## 2020-10-23 LAB — COPATH REPORT: NORMAL

## 2020-10-23 PROCEDURE — 88321 CONSLTJ&REPRT SLD PREP ELSWR: CPT | Performed by: PATHOLOGY

## 2020-10-23 PROCEDURE — 999N001032 HC STATISTIC REVIEW OUTSIDE SLIDES TC 88321: Performed by: OTOLARYNGOLOGY

## 2020-10-27 ENCOUNTER — TELEPHONE (OUTPATIENT)
Dept: OTOLARYNGOLOGY | Facility: CLINIC | Age: 85
End: 2020-10-27

## 2020-10-27 NOTE — TELEPHONE ENCOUNTER
[facility] Call Center    Phone Message    May a detailed message be left on voicemail: yes     Reason for Call: Other: Adelia the care coordinator called regarding the conference Dr. Villalobos had for the Pt last Friday. Adelia stated she was to have receive a call back but never did. I reached out to the back line and Sharmila stated to send over a message and Ambar will give her a call back as soon as she can. The best phone number is [phone]. Thank you!     Action Taken: Message routed to:  [facility] ENT    Travel Screening: Not Applicable

## 2020-10-28 NOTE — TELEPHONE ENCOUNTER
Care coordinator returned call. Writer reviewed the need for MRI to further evaluate patient's tumor as discussed with patient's son.     Patient is now scheduled for MRI scan tomorrow 10/29 [facility].    Reviewed date, time and location with Adelia and she was encouraged to french with further questions or concerns.     Ambar Aguilar, RN, BSN

## 2020-10-28 NOTE — TELEPHONE ENCOUNTER
Returned call to care coordinator but there was no answer. Writer left message with direct line for return call to discuss.     Ambar Aguilar, RN, BSN

## 2020-10-29 ENCOUNTER — TRANSFERRED RECORDS (OUTPATIENT)
Dept: HEALTH INFORMATION MANAGEMENT | Facility: CLINIC | Age: 85
End: 2020-10-29

## 2020-10-29 NOTE — PROGRESS NOTES
Head & Neck Tumor Conference Note        Status: Established  Staff: Dr. Villalobos    Tumor Site: Right hard palate  Tumor Pathology: SCC  Tumor Stage: cT2 vs T4a NxMx  Tumor Treatment: None    Reason for Review: Review imaging, path, and POC    Brief History: This is a 89 year old female who was recently noted to have a lesion in the oral cavity with focus of early invasive squamous cell carcinoma found by her dentist. CT was obtained which demonstrated possible bony erosion. MRI was obtained, which we are here to review.    Pertinent PMH:   Past Medical History:   Diagnosis Date     Cataracts, both eyes      Glaucoma suspect         Smoking Hx:   Social History     Tobacco Use     Smoking status: Never Smoker     Smokeless tobacco: Never Used   Substance Use Topics     Alcohol use: No     Drug use: No       Imaging:   MRI Neck w/wo contrast (10/29/20):  Sinus enhancement does appears to be reactive mucosa changes and not involved with hard palate tumor.    CT Neck w/ IV contrast (10/20/20):  Asymmetric mucosal thickening in the right hard palate with possible  bony dehiscence along the floor of the right maxillary sinus. This  likely represents the biopsy-proven primary mass with possible  invasion along the floor of the right maxillary sinus. This lesion  does not cross midline. No cervical lymphadenopathy.     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  The neoplastic cells    are negative for the stain.     Tumor Board Recommendation:   Discussion: Reviewed imaging, no extension into the sinus cavity, there is a questionable defect on the floor of the nasal cavity but there is a symmetric lesion on the left, it may represent anatomic  variant and not bony erosion.     Plan:   - if patient and family wish to proceed, would recommend OR for mucosal resection of the hard palate  - would need preoperative clearance given age and comorbidities    Leo Stafford MD PGY-3  Otolaryngology- Head and Neck Surgery    Documentation / Disclaimer Cancer Tumor Board Note  Cancer tumor board recommendations do not override what is determined to be reasonable care and treatment, which is dependent on the circumstances of a patient's case; the patient's medical, social, and personal concerns; and the clinical judgment of the oncologist [physician].

## 2020-10-30 ENCOUNTER — TUMOR CONFERENCE (OUTPATIENT)
Dept: ONCOLOGY | Facility: CLINIC | Age: 85
End: 2020-10-30
Payer: MEDICARE

## 2020-10-30 NOTE — PROGRESS NOTES
Called and spoke with patient's son and daughter in law regarding MRI review and tumor board discussion. Reviewed that our radiologist does not feel that there is bony erosion into the nasal floor or sinus. Dr. Villalobos feels that given the MRI results we could plan for a mucosal resection which would not require and obturator.     Family is very happy with this information and would like to further explore the option of surgery. Reviewed that we would like for her to see our PAC clinic for evaluation prior to surgery to ensure they are comfortable with her proceeding with surgery. PAC clinic evaluation scheduled for 11/15 at 1:15pm. This information was called to patient's coordinator, Adelia and advised her to return call if this date and time does not work.     Family is requesting conference to further discuss surgery and ensure patient fully understands prior to planning to proceed with surgery. Family conference scheduled on 11/11 at 11:45am. Dr. Richards who is patient's PCP would also like to be involved. Writer sent information to Dr. Richards and will await his availability. Once confirmed we will confirm with family.     Writer reviewed that we will hold operating room time the 3rd or 4th week in November most likely to proceed with surgery. Family was comfortable with this plan and verbalized understanding. They were encouraged to call with any further questions or concerns.     Ambar Aguilar, RN, BSN

## 2020-11-02 NOTE — TELEPHONE ENCOUNTER
FUTURE VISIT INFORMATION      SURGERY INFORMATION:    Griselda referral DOS TBD    Consult: ov 10/21    RECORDS REQUESTED FROM:       Primary Care Provider: Rafat Richards MD- [facility]    Most recent EKG+ Tracing: 3/4/10

## 2020-11-04 ENCOUNTER — PREP FOR PROCEDURE (OUTPATIENT)
Dept: OTOLARYNGOLOGY | Facility: CLINIC | Age: 85
End: 2020-11-04

## 2020-11-04 DIAGNOSIS — Z11.59 ENCOUNTER FOR SCREENING FOR OTHER VIRAL DISEASES: Primary | ICD-10-CM

## 2020-11-04 DIAGNOSIS — C05.0 CANCER OF HARD PALATE (H): Primary | ICD-10-CM

## 2020-11-05 ENCOUNTER — OFFICE VISIT (OUTPATIENT)
Dept: SURGERY | Facility: CLINIC | Age: 85
End: 2020-11-05
Payer: MEDICARE

## 2020-11-05 ENCOUNTER — PRE VISIT (OUTPATIENT)
Dept: SURGERY | Facility: CLINIC | Age: 85
End: 2020-11-05

## 2020-11-05 ENCOUNTER — ANESTHESIA EVENT (OUTPATIENT)
Dept: SURGERY | Facility: CLINIC | Age: 85
DRG: 138 | End: 2020-11-05
Payer: MEDICARE

## 2020-11-05 VITALS
BODY MASS INDEX: 18.03 KG/M2 | SYSTOLIC BLOOD PRESSURE: 132 MMHG | TEMPERATURE: 97.9 F | HEART RATE: 65 BPM | OXYGEN SATURATION: 96 % | RESPIRATION RATE: 16 BRPM | HEIGHT: 62 IN | DIASTOLIC BLOOD PRESSURE: 53 MMHG | WEIGHT: 98 LBS

## 2020-11-05 DIAGNOSIS — Z01.818 PRE-OP EVALUATION: ICD-10-CM

## 2020-11-05 DIAGNOSIS — C05.0 CANCER OF HARD PALATE (H): ICD-10-CM

## 2020-11-05 DIAGNOSIS — Z01.818 PRE-OP EVALUATION: Primary | ICD-10-CM

## 2020-11-05 LAB
ANION GAP SERPL CALCULATED.3IONS-SCNC: 3 MMOL/L (ref 3–14)
BUN SERPL-MCNC: 35 MG/DL (ref 7–30)
CALCIUM SERPL-MCNC: 8.9 MG/DL (ref 8.5–10.1)
CHLORIDE SERPL-SCNC: 110 MMOL/L (ref 94–109)
CO2 SERPL-SCNC: 30 MMOL/L (ref 20–32)
CREAT SERPL-MCNC: 0.94 MG/DL (ref 0.52–1.04)
ERYTHROCYTE [DISTWIDTH] IN BLOOD BY AUTOMATED COUNT: 13.1 % (ref 10–15)
GFR SERPL CREATININE-BSD FRML MDRD: 51 ML/MIN/{1.73_M2}
GLUCOSE SERPL-MCNC: 81 MG/DL (ref 70–99)
HCT VFR BLD AUTO: 36.4 % (ref 35–47)
HGB BLD-MCNC: 11.5 G/DL (ref 11.7–15.7)
MCH RBC QN AUTO: 32.9 PG (ref 26.5–33)
MCHC RBC AUTO-ENTMCNC: 31.6 G/DL (ref 31.5–36.5)
MCV RBC AUTO: 104 FL (ref 78–100)
NT-PROBNP SERPL-MCNC: 1540 PG/ML (ref 0–450)
PLATELET # BLD AUTO: 141 10E9/L (ref 150–450)
POTASSIUM SERPL-SCNC: 4 MMOL/L (ref 3.4–5.3)
RBC # BLD AUTO: 3.5 10E12/L (ref 3.8–5.2)
SODIUM SERPL-SCNC: 142 MMOL/L (ref 133–144)
WBC # BLD AUTO: 4.3 10E9/L (ref 4–11)

## 2020-11-05 PROCEDURE — 83880 ASSAY OF NATRIURETIC PEPTIDE: CPT | Mod: GZ | Performed by: PATHOLOGY

## 2020-11-05 PROCEDURE — 80048 BASIC METABOLIC PNL TOTAL CA: CPT | Performed by: PATHOLOGY

## 2020-11-05 PROCEDURE — 99213 OFFICE O/P EST LOW 20 MIN: CPT | Performed by: PHYSICIAN ASSISTANT

## 2020-11-05 PROCEDURE — 36415 COLL VENOUS BLD VENIPUNCTURE: CPT | Performed by: PATHOLOGY

## 2020-11-05 PROCEDURE — 85027 COMPLETE CBC AUTOMATED: CPT | Performed by: PATHOLOGY

## 2020-11-05 RX ORDER — IBUPROFEN 200 MG
CAPSULE ORAL EVERY EVENING
COMMUNITY

## 2020-11-05 ASSESSMENT — LIFESTYLE VARIABLES: TOBACCO_USE: 0

## 2020-11-05 ASSESSMENT — MIFFLIN-ST. JEOR: SCORE: 787.78

## 2020-11-05 ASSESSMENT — PAIN SCALES - GENERAL: PAINLEVEL: NO PAIN (0)

## 2020-11-05 NOTE — H&P
Pre-Operative H & P     CC:  Preoperative exam to assess for increased cardiopulmonary risk while undergoing surgery and anesthesia.    Date of Encounter: 11/5/2020  Primary Care Physician:  Rafat Richards  Associated diagnosis: cancer of hard palate    HPI  Anca Alanis is a 89 year old female who presents for pre-operative H & P in preparation for Wide local excision of hard palate with Dr. Villalobos on 11/16/20 at [facility]. Patient is being evaluated for comorbid conditions of Alzheimer s, anxiety, OA, osteoporosis, spinal stenosis, cataract      Ms. Alanis was recently diagnosed with a lesion in her oral cavity.  She is unsure how long the lesion has been there. She reports she has never experienced pain or bleeding from the site. Biopsy was completed and showed high grade dysplasia with a focus of invasive carcinoma.  She was seen by Dr. Gonzalez for further evaluation. She completed an MRI without evidence of bony erosion. Wide local excision was recommended. The family will be completing a conference prior to surgery. She is being seen today for anesthesia evaluation.      History is obtained from the patient and chart review.    Past Medical History  Past Medical History:   Diagnosis Date     Cataracts, both eyes      Glaucoma suspect        Past Surgical History  No past surgical history on file.    Hx of Blood transfusions/reactions: denies     Hx of abnormal bleeding or anti-platelet use: denies    Menstrual history: No LMP recorded (lmp unknown). Patient is postmenopausal.    Steroid use in the last year: denies    Personal or FH with difficulty with Anesthesia:  She has never had anesthesia in the past    Prior to Admission Medications  Current Outpatient Medications   Medication Sig Dispense Refill     acetaminophen (TYLENOL) 325 MG tablet Take 2 tablets (650 mg) by mouth At Bedtime (Patient taking differently: Take 650 mg by mouth as needed )  100 tablet      calcium carbonate 500 mg, elemental, (OSCAL 500) 1250 (500 Ca) MG TABS tablet Take by mouth every evening       melatonin 3 MG tablet Take 1 tablet (3 mg) by mouth At Bedtime 90 tablet 3     memantine XR (NAMENDA XR) 14 MG 24 hr capsule Take 1 capsule (14 mg) by mouth daily (Patient taking differently: Take 14 mg by mouth every evening ) 90 capsule 1     Multiple Vitamin (MULTIVITAMIN OR) Take 0.5 tablets by mouth every evening          Allergies  Allergies   Allergen Reactions     Ciprofloxacin      Sulfa Drugs Nausea       Social History  Social History     Socioeconomic History     Marital status:      Spouse name: Not on file     Number of children: Not on file     Years of education: Not on file     Highest education level: Not on file   Occupational History     Not on file   Social Needs     Financial resource strain: Not on file     Food insecurity     Worry: Not on file     Inability: Not on file     Transportation needs     Medical: Not on file     Non-medical: Not on file   Tobacco Use     Smoking status: Former Smoker     Types: Cigarettes     Quit date: 1960     Years since quittin.8     Smokeless tobacco: Never Used   Substance and Sexual Activity     Alcohol use: No     Drug use: No     Sexual activity: Not Currently     Partners: Male   Lifestyle     Physical activity     Days per week: Not on file     Minutes per session: Not on file     Stress: Not on file   Relationships     Social connections     Talks on phone: Not on file     Gets together: Not on file     Attends [employer] service: Not on file     Active member of club or organization: Not on file     Attends meetings of clubs or organizations: Not on file     Relationship status: Not on file     Intimate partner violence     Fear of current or ex partner: Not on file     Emotionally abused: Not on file     Physically abused: Not on file     Forced sexual activity: Not on file   Other Topics Concern      "Parent/sibling w/ CABG, MI or angioplasty before 65F 55M? Not Asked   Social History Narrative     Not on file       Family History  Family History   Problem Relation Age of Onset     Hypertension Sister      Diabetes Paternal Grandmother      Diabetes Son      Deep Vein Thrombosis (DVT) No family hx of      Anesthesia Reaction No family hx of        ROS/MED HX    ENT/Pulmonary:  - neg pulmonary ROS    (-) tobacco use   Neurologic: Comment: Alzheimer's disorder      Cardiovascular:  - neg cardiovascular ROS   (+) ----. : . . . :. . Previous cardiac testing date:results:date: results:ECG reviewed date:2010 results:Sinus bradycardia with PACs date: results:         (-) taking anticoagulants/antiplatelets   METS/Exercise Tolerance:  3 - Able to walk 1-2 blocks without stopping   Hematologic:  - neg hematologic  ROS      (-) History of Transfusion   Musculoskeletal: Comment: Osteoporosis   Spinal stenosis  (+) arthritis,  -       GI/Hepatic:  - neg GI/hepatic ROS       Renal/Genitourinary:  - ROS Renal section negative       Endo:  - neg endo ROS       Psychiatric:     (+) psychiatric history anxiety      Infectious Disease:  - neg infectious disease ROS       Malignancy:   (+) Malignancy History of Other  Other CA cancer of hard palate Active status post         Other:           The complete review of systems is negative other than noted in the HPI or here.   Temp: 97.9  F (36.6  C) Temp src: Oral BP: 132/53 Pulse: 65   Resp: 16 SpO2: 96 %         98 lbs 0 oz  5' 2\"   Body mass index is 17.92 kg/m .       Physical Exam  Constitutional: Awake, alert, cooperative, no apparent distress, and appears stated age.  Eyes: Pupils equal, round and reactive to light, extra ocular muscles intact, sclera clear, conjunctiva normal.  HENT: Normocephalic, oral pharynx with moist mucus membranes  Respiratory: Clear to auscultation bilaterally, no crackles or wheezing.  Cardiovascular: Regular rate and rhythm, systolic murmur noted.  " Carotids no bruits. No edema. Palpable pulses to radial arteries.   GI: Normal bowel sounds, soft, non-distended, non-tender  Genitourinary:  deferred  Skin: Warm and dry.    Musculoskeletal: Full ROM of neck. There is no redness, warmth, or swelling of the exposed joints.   Neurologic: Awake, alert, oriented to name, place and time. Cranial nerves II-XII are grossly intact.    Neuropsychiatric: Calm, cooperative. Normal affect.     Labs: (personally reviewed)  Component      Latest Ref Rng & Units 11/5/2020   Sodium      133 - 144 mmol/L 142   Potassium      3.4 - 5.3 mmol/L 4.0   Chloride      94 - 109 mmol/L 110 (H)   Carbon Dioxide      20 - 32 mmol/L 30   Anion Gap      3 - 14 mmol/L 3   Glucose      70 - 99 mg/dL 81   Urea Nitrogen      7 - 30 mg/dL 35 (H)   Creatinine      0.52 - 1.04 mg/dL 0.94   GFR Estimate      >60 mL/min/1.73:m2 51 (L)   GFR Estimate If Black      >60 mL/min/1.73:m2 59 (L)   Calcium      8.5 - 10.1 mg/dL 8.9   WBC      4.0 - 11.0 10e9/L 4.3   RBC Count      3.8 - 5.2 10e12/L 3.50 (L)   Hemoglobin      11.7 - 15.7 g/dL 11.5 (L)   Hematocrit      35.0 - 47.0 % 36.4   MCV      78 - 100 fl 104 (H)   MCH      26.5 - 33.0 pg 32.9   MCHC      31.5 - 36.5 g/dL 31.6   RDW      10.0 - 15.0 % 13.1   Platelet Count      150 - 450 10e9/L 141 (L)       EKG 11/5/20  Unusual P axis, possible ectopic atrial rhythm with undetermined rhythm irregularity  Incomplete right bundle branch block  Septal infarct , age undetermined  Possible Lateral infarct , age undetermined  Abnormal ECG    Outside records reviewed from: care everywhere    ASSESSMENT and PLAN  Anca Alanis is a 89 year old female scheduled for wide local excision of hard palate on 11/16/20 by Dr. Villalobos in treatment of cancer of the hard palate.  PAC referral for risk assessment and optimization for anesthesia with comorbid conditions of Alzheimer's, anxiety, OA, osteoporosis, spinal stenosis, cataract:    Pre-operative  "considerations:  1.  Cardiac:  Functional status- METS 3. No reported cardiac history. denies cardiac symptoms. Known cardiac murmur. Will complete EKG and ECHO- have helped to schedule. Will also update BNP.  intermediate risk surgery with 0.4% (RCRI #) risk of major adverse cardiac event.   2.  Pulm:  Airway feasible.  ITALO risk: low. denies pulmonary symptoms. COVID testing ordered per surgery team.   3.  GI:  Risk of PONV score = 3.  If > 2, anti-emetic intervention recommended.   4.  Neuro: alzheimer's disease using memantine. Stable for many years. Patient was able to provide most of her medical history with some assistance from .   5. Psych: anxiety- stable  6. MSK: OA and osteoporosis, spinal stenosis  7. ENT: recent diagnosis of cancer of hard palate with above procedure planned.  She will be meeting with surgery team and her PCP to discuss if she will be proceeding with the above proceudre 11/11/20.  Hearing difficulty.   8. Patient is very frail- BMI 18    VTE risk: 3%    Patient also evaluated by Dr. Dahl. See below    Addendum 11/6/20:  Patient completed ECHO with following results:  \"Interpretation Summary  Moderate to severe aortic stenosis is present. LILIAN is 1.0cm2. Obtained mean  gradient is not in the severe range likely due to low flow. Severe aortic  valve calcification is present. Visually valve opening is severely restricted.  Global and regional left ventricular function is normal with an EF of 60-65%.  Global right ventricular function is normal.  IVC is dilated. Right atrial pressure is elevated at 15 mmHg or higher.  Trivial pericardial effusion is present.  Previous study not available for comparison.\"    Discussed results with Dr. Dahl. I called the patient's son with the results. Dr. Dahl will send a message to the surgeon.    \"STAFF:  89 y.o. woman with SCCa of the hard palate for wide local excision by Dr. Bridges using general anesthesia.   History summarized " "above. Of special note:  #High fragility score; needs partial assistance at home; dementia on memantine.  # severe osteoporosis  # Some weight loss (BMI < 18)  DISCUSSION with family:   Patient has never had anesthesia!  Spoke with her and family at length about risks, especially regarding postoperative cognitive dysfunction --- this is quite likely given her dementia and hearing loss.   In addition, patient at increased risk for falls, bed sores, etc. NSQIP prediction <using [place] code of 24508 for procedure code> suggests 16% chance of serious complications and 40% chance of need for discharge to skilled rehab type facility.   Plan:  1. Cardiac murmur present,,,,, given the possibility of significant AS, will need resting ECHO to check cardiac valves.  2.  To be re-discussed at tumor board with her physicians and family  Instructions given and questions answered.   Final plans by anesthesiology team on DOS.   ---rcp\"    Ritu Butts PA-C  Preoperative Assessment Center  [facility]  Phone: [phone]  Fax: [phone]  "

## 2020-11-05 NOTE — ANESTHESIA PREPROCEDURE EVALUATION
Anesthesia Pre-Procedure Evaluation    Patient: Anca Alanis   MRN:     [id number] Gender:   female   Age:    89 year old :      1924        Preoperative Diagnosis: Cancer of hard palate (H) [C05.0]   Procedure(s):  Wide local excision of hard palate     LABS:  CBC:   Lab Results   Component Value Date    WBC 6.8 2018    WBC 11.1 (H) 2018    HGB 10.1 (L) 2018    HGB 10.4 (L) 2018    HCT 31.3 (L) 2018    HCT 32.1 (L) 2018     2018     2018     BMP:   Lab Results   Component Value Date     2018     2018    POTASSIUM 3.9 2018    POTASSIUM 4.0 2018    CHLORIDE 107 2018    CHLORIDE 97 2018    CO2 20 2018    CO2 25 2018    BUN 33 (H) 2018    BUN 26.6 (H) 2017    CR 0.83 2018    CR 0.9 2017     (H) 2018    .3 (H) 2017     COAGS:   Lab Results   Component Value Date    PTT 30 2018    INR 1.24 (H) 2018     POC:   Lab Results   Component Value Date    BGM 88 2010     OTHER:   Lab Results   Component Value Date    LACT 0.8 2018    A1C 5.6 2010    ANH 9.1 2018    PHOS 2.5 01/10/2005    MAG 1.9 01/10/2005    ALBUMIN 1.8 (L) 2018    PROTTOTAL 6.5 (L) 2010    ALT 17 2010    AST 34 2010    ALKPHOS 76 2010    BILITOTAL 0.4 2010    LIPASE 53 2007    AMYLASE 66 2007    TSH 2.45 2017    SED 9 2010        Preop Vitals    BP Readings from Last 3 Encounters:   10/21/20 122/70   20 (!) 142/84   19 146/68    Pulse Readings from Last 3 Encounters:   10/21/20 62   20 76   19 77      Resp Readings from Last 3 Encounters:   10/21/20 14   20 16   18 16    SpO2 Readings from Last 3 Encounters:   10/21/20 98%   20 95%   19 95%      Temp Readings from Last 1 Encounters:   10/21/20 97.9  F (36.6  C)    Ht Readings from Last 1  "Encounters:   10/21/20 1.575 m (5' 2\")      Wt Readings from Last 1 Encounters:   10/21/20 44 kg (97 lb)    Estimated body mass index is 17.74 kg/m  as calculated from the following:    Height as of 10/21/20: 1.575 m (5' 2\").    Weight as of 10/21/20: 44 kg (97 lb).     LDA:        Past Medical History:   Diagnosis Date     Cataracts, both eyes      Glaucoma suspect       No past surgical history on file.   Allergies   Allergen Reactions     Ciprofloxacin      Sulfa Drugs Nausea        Anesthesia Evaluation     . Pt has not had prior anesthetic            ROS/MED HX    ENT/Pulmonary:  - neg pulmonary ROS    (-) tobacco use   Neurologic: Comment: Alzheimer's disorder      Cardiovascular:  - neg cardiovascular ROS   (+) ----. : . . . :. . Previous cardiac testing date:results:date: results:ECG reviewed date:2010 results:Sinus bradycardia with PACs date: results:         (-) taking anticoagulants/antiplatelets   METS/Exercise Tolerance:  3 - Able to walk 1-2 blocks without stopping   Hematologic:  - neg hematologic  ROS      (-) History of Transfusion   Musculoskeletal: Comment: Osteoporosis   Spinal stenosis  (+) arthritis,  -       GI/Hepatic:  - neg GI/hepatic ROS       Renal/Genitourinary:  - ROS Renal section negative       Endo:  - neg endo ROS       Psychiatric:     (+) psychiatric history anxiety      Infectious Disease:  - neg infectious disease ROS       Malignancy:   (+) Malignancy History of Other  Other CA cancer of hard palate Active status post         Other:                         PHYSICAL EXAM:   Mental Status/Neuro:    Airway: Facies: Feasible  Mallampati: II  Mouth/Opening: Full  TM distance: > 6 cm  Neck ROM: Full   Respiratory: Auscultation: CTAB     Resp. Rate: Normal     Resp. Effort: Normal      CV: Rhythm: Regular  Heart: Murmur (Systolic)  Edema: None   Comments:                      Assessment:   ASA SCORE: 4      Smoking Status:  Non-Smoker/Unknown   NPO Status: NPO Appropriate     Plan: "   Anes. Type:  General   Pre-Medication: None   Induction:  IV (Standard)   Airway: ETT; Oral   Access/Monitoring: PIV; A-Line (Pre-induction)   Maintenance: Balanced     Postop Plan:   Postop Pain: Opioids  Postop Sedation/Airway: Not planned  Disposition: Inpatient/Admit     PONV Management:   Adult Risk Factors: Female, Non-Smoker, Postop Opioids   Prevention: Ondansetron, Dexamethasone     CONSENT: Direct conversation   Plan and risks discussed with: Patient   Blood Products: Consent Deferred (Minimal Blood Loss)                PAC Discussion and Assessment    ASA Classification: 4  Case is suitable for: [place]  Anesthetic techniques and relevant risks discussed: GA  Invasive monitoring and risk discussed:   Types:   Possibility and Risk of blood transfusion discussed:   NPO instructions given:   Additional anesthetic preparation and risks discussed:   Needs early admission to pre-op area:   Other:     PAC Resident/NP Anesthesia Assessment:  Anca Alanis is a 89 year old female scheduled for wide local excision of hard palate on 11/16/20 by Dr. Villalobos in treatment of cancer of the hard palate.  PAC referral for risk assessment and optimization for anesthesia with comorbid conditions of Alzheimer's, anxiety, OA, osteoporosis, spinal stenosis, cataract:    Pre-operative considerations:  1.  Cardiac:  Functional status- METS 3. No reported cardiac history. denies cardiac symptoms. Known cardiac murmur. Will complete EKG and ECHO- have helped to schedule. Will also update BNP.  intermediate risk surgery with 0.4% (RCRI #) risk of major adverse cardiac event.   2.  Pulm:  Airway feasible.  ITALO risk: low. denies pulmonary symptoms. COVID testing ordered per surgery team.   3.  GI:  Risk of PONV score = 3.  If > 2, anti-emetic intervention recommended.   4.  Neuro: alzheimer's disease using memantine. Stable for many years. Patient was able to provide most of her medical history with some assistance from case  "worker.   5. Psych: anxiety- stable  6. MSK: OA and osteoporosis, spinal stenosis  7. ENT: recent diagnosis of cancer of hard palate with above procedure planned.  She will be meeting with surgery team and her PCP to discuss if she will be proceeding with the above proceudre 11/11/20.  Hearing difficulty.   8. Patient is very frail- BMI 18    VTE risk: 3%    Patient also evaluated by Dr. Dahl. See below    Addendum 11/6/20:  Patient completed ECHO with following results:  \"Interpretation Summary  Moderate to severe aortic stenosis is present. LILIAN is 1.0cm2. Obtained mean  gradient is not in the severe range likely due to low flow. Severe aortic  valve calcification is present. Visually valve opening is severely restricted.  Global and regional left ventricular function is normal with an EF of 60-65%.  Global right ventricular function is normal.  IVC is dilated. Right atrial pressure is elevated at 15 mmHg or higher.  Trivial pericardial effusion is present.  Previous study not available for comparison.\"    Discussed results with Dr. Dahl. I called the patient's son with the results. Dr. Dahl will send a message to the surgeon.    **For further details of assessment, testing, and physical exam please see H and P completed on same date.      Ritu Butts PA-C        Mid-Level Provider/Resident:   Date:   Time:     Attending Anesthesiologist Anesthesia Assessment:  STAFF:  89 y.o. woman with SCCa of the hard palate for wide local excision by Dr. Bridges using general anesthesia.   History summarized above. Of special note:  #High fragility score; needs partial assistance at home; dementia on memantine.  # severe osteoporosis  # Some weight loss (BMI < 18)  DISCUSSION with family:   Patient has never had anesthesia!  Spoke with her and family at length about risks, especially regarding postoperative cognitive dysfunction --- this is quite likely given her dementia and hearing loss.   In addition, " patient at increased risk for falls, bed sores, etc. NSQIP prediction <using CPT code of 59429 for procedure code> suggests 16% chance of serious complications and 40% chance of need for discharge to skilled rehab type facility.   Plan:  1. Cardiac murmur present,,,,, given the possibility of significant AS, will need resting ECHO to check cardiac valves.  2.  To be re-discussed at tumor board with her physicians and family  Instructions given and questions answered.   Final plans by anesthesiology team on DOS.   ---rcp    Addendum on 11/6/2020  The CARDIAC ECHO STUDY for Mrs. Alanis reveals (essentially) severe aortic stenosis with very restricted leaflet motion and heavy valve calcification (see summary in cardiology ECHO report).  Her right side heart pressures are also elevated.  While this pathology wouldn't necessarily exclude surgery IN A MUCH YOUNGER PATIENT, I think it clearly places  (at age 89 and frail) at very high risk of coronary ischemia, cardiac injury, heart failure, and even embolic stroke from the calcified valve lesions. I urge extreme caution while considering operative interventions.    Reviewed and Signed by PAC Anesthesiologist  Anesthesiologist: OLIVA  Date: 11/5/2020  Time:   Pass/Fail: Pass  Disposition:     PAC Pharmacist Assessment:        Pharmacist:   Date:   Time:    Ritu Butts PA-C

## 2020-11-05 NOTE — PATIENT INSTRUCTIONS
Preparing for Your Surgery      Name:  Anca Alanis   MRN:  [id number]   :  1924   Today's Date:  2020       Arriving for surgery:  Surgery date:  20  Arrival time:  10:00 am    Restrictions due to COVID 19:  Patients are allowed one visitor in the pre-op period  All visitors must wear a mask  No visitors under 18  No ill visitors   parking is available for anyone with mobility limitations or disabilities.  ([place]  24 hours/ 7 days a week; [facility]  7 am- 3:30 pm, Mon- Fri)    Please come to:       [facility], [place] [address]  [address] [place]  [address]       -    Please proceed to the Surgery Lounge on the 3rd floor. [phone]?     - ?If you are in need of directions, wheelchair or escort please stop at the Information Desk in the lobby.  Inform the information person that you are here for surgery; a wheelchair and escort will be provided to the Surgery Lounge .?     What can I eat or drink?  -  You may eat and drink normally for up to 8 hours before your surgery. (Until 4:00 am)  -  You may have clear liquids until 2 hours before surgery. (Until 10:00 am)    Examples of clear liquids:  Water  Clear broth  Juices (apple, white grape, white cranberry  and cider) without pulp  Noncarbonated, powder based beverages  (lemonade and Adeel-Aid)  Sodas (Sprite, 7-Up, ginger ale and seltzer)  Coffee or tea (without milk or cream)  Gatorade    -  No Alcohol for at least 24 hours before surgery     Which medicines can I take?    Hold Aspirin for 7 days before surgery.   Hold Multivitamins for 7 days before surgery.  Hold Supplements for 7 days before surgery.  Hold Ibuprofen (Advil, Motrin) for 1 day before surgery--unless otherwise directed by surgeon.  Hold Naproxen (Aleve) for 4 days before surgery.    -  PLEASE TAKE these medications the day of surgery:  Acetaminophen (Tylenol) if needed    How do I prepare myself?  - Please take 2 showers before  surgery using Scrubcare or Hibiclens soap.    Use this soap only from the neck to your toes.     Leave the soap on your skin for one minute--then rinse thoroughly.      You may use your own shampoo and conditioner; no other hair products.   - Please remove all jewelry and body piercings.  - No lotions, deodorants or fragrance.  - No makeup or fingernail polish.   - Bring your ID and insurance card.    - All patients are required to have a Covid-19 test within 4 days of surgery/procedure.      -Patients will be contacted by the [facility] scheduling team within 1 week of surgery to make an appointment.      - Patients may call the Scheduling team at [phone] if they have not been scheduled within 4 days of  surgery.      ALL PATIENTS GOING HOME THE SAME DAY OF SURGERY ARE REQUIRED TO HAVE A RESPONSIBLE ADULT TO DRIVE AND BE IN ATTENDANCE WITH THEM FOR 24 HOURS FOLLOWING SURGERY     Questions or Concerns:    - For any questions regarding the day of surgery or your hospital stay, please contact the Pre Admission Nursing Office at [phone].       - If you have health changes between today and your surgery please call your surgeon.       For questions after surgery please call your surgeons office.

## 2020-11-06 ENCOUNTER — ANCILLARY PROCEDURE (OUTPATIENT)
Dept: CARDIOLOGY | Facility: CLINIC | Age: 85
End: 2020-11-06
Attending: PHYSICIAN ASSISTANT
Payer: MEDICARE

## 2020-11-06 LAB — INTERPRETATION ECG - MUSE: NORMAL

## 2020-11-06 PROCEDURE — 93306 TTE W/DOPPLER COMPLETE: CPT | Performed by: INTERNAL MEDICINE

## 2020-11-11 ENCOUNTER — OFFICE VISIT (OUTPATIENT)
Dept: OTOLARYNGOLOGY | Facility: CLINIC | Age: 85
End: 2020-11-11
Payer: MEDICARE

## 2020-11-11 VITALS
WEIGHT: 98 LBS | TEMPERATURE: 97 F | OXYGEN SATURATION: 87 % | HEART RATE: 60 BPM | BODY MASS INDEX: 18.03 KG/M2 | DIASTOLIC BLOOD PRESSURE: 65 MMHG | RESPIRATION RATE: 16 BRPM | HEIGHT: 62 IN | SYSTOLIC BLOOD PRESSURE: 130 MMHG

## 2020-11-11 DIAGNOSIS — C05.0 CANCER OF HARD PALATE (H): Primary | ICD-10-CM

## 2020-11-11 PROCEDURE — 99214 OFFICE O/P EST MOD 30 MIN: CPT | Performed by: OTOLARYNGOLOGY

## 2020-11-11 ASSESSMENT — MIFFLIN-ST. JEOR: SCORE: 787.78

## 2020-11-11 ASSESSMENT — PAIN SCALES - GENERAL: PAINLEVEL: NO PAIN (0)

## 2020-11-11 NOTE — NURSING NOTE
Teaching Flowsheet - ENT   Relevant Diagnosis: scc palate  Teaching Topic: wide local excision of hard palate   Person(s) involved in teaching: patient, spouse and care coordinator from [employer]        Motivation Level:  Asks Questions:   Yes  Eager to Learn:   Yes  Cooperative:   Yes  Receptive (willing/able to accept information):   Yes  Comments: Reviewed pre-op H and P,  NPO prior to  surgery,  pre-op scrub (given Hibiclens)  Reviewed post-op  cares , activity and pain.     Patient demonstrates understanding of the following:  Reason for the appointment, diagnosis and treatment plan:   Yes  Knowledge of proper use of medications and conditions for which they are ordered (with special attention to potential side effects or drug interactions):  stop aspirin products 1 week before surgery Yes  Which situations necessitate calling provider and whom to contact:   Yes  Nutritional needs and diet plan:   Yes  Pain management techniques:   Yes  Patient instructed on hand hygiene:  Yes  How and/when to access community resources:   Yes     Infection Prevention:  Patient   demonstrates understanding of the following:  Surgical procedure site care taught Yes  Signs and symptoms of infection taught Yes  Wound care taught Yes  Instructional Materials Used/Given: pre- op booklet,verbal  Instruction.    Ambar Aguilar, RN, BSN

## 2020-11-11 NOTE — PROGRESS NOTES
Ms. Alanis returns today.  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  We reviewed her images at our Tumor Board, and it does not appear to be eroding through the floor of the maxillary sinus.  Complicating things, of course, is her age of 89 years old.  She was seen by the PAC clinic and estimated to have an approximately 4% risk of mortality of undergoing surgery and 13-16% risk of some sort of complications that are significant.      Today's visit was an in-person visit with the patient and a video visit with her family member and Dr. Richards as well to discuss her options.      I spent 20 minutes with the patient and the rest of the team to discuss the implications of the surgery and my assessment that she is certainly at risk for complications and even mortality upon induction of anesthesia or a perioperative complication.  My hope is that none of these things would happen; however, and I worry that at the same time if she does not have this tumor removed, it will continue to grow and begin to obstruct the oral cavity and oropharynx such that she would need a trach and feeding tube and also have significant pain and bleeding as well as a malodorous problem in the oral cavity.  Ultimately, hearing both the possibilities of negative outcomes and the risks of leaving the tumor untreated or even doing palliative radiation, the patient and family seem to have settled on moving forward with surgery.  I counseled them extensively regarding the significant risk to her, and they are aware of this.  They understand also that this is a very difficult situation given her age and the nature of the tumor which precluded an easy decision in terms of which way to move.  After all their questions were answered, surgery will be scheduled for next week.  They understand that she may very well need a feeding tube until she is able to eat properly orally.  I spent 30 minutes  today, 25 of which was counseling and coordination of care, regarding the surgical decision making above.

## 2020-11-11 NOTE — NURSING NOTE
"Chief Complaint   Patient presents with     RECHECK     follow up      Blood pressure 130/65, pulse 60, temperature 97  F (36.1  C), resp. rate 16, height 1.575 m (5' 2\"), weight 44.5 kg (98 lb), SpO2 (!) 87 %, not currently breastfeeding.    Willie Tang LPN    "

## 2020-11-11 NOTE — LETTER
11/11/2020       RE: Anca Alanis  [address] [place] [address]     Dear Colleague,    Thank you for referring your patient, Anca Alanis, to the [facility] [place] at [facility]. Please see a copy of my visit note below.    Ms. Alanis returns today.  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  We reviewed her images at our Tumor Board, and it does not appear to be eroding through the floor of the maxillary sinus.  Complicating things, of course, is her age of 89 years old.  She was seen by the PAC clinic and estimated to have an approximately 4% risk of mortality of undergoing surgery and 13-16% risk of some sort of complications that are significant.      Today's visit was an in-person visit with the patient and a video visit with her family member and Dr. Richards as well to discuss her options.      I spent 20 minutes with the patient and the rest of the team to discuss the implications of the surgery and my assessment that she is certainly at risk for complications and even mortality upon induction of anesthesia or a perioperative complication.  My hope is that none of these things would happen; however, and I worry that at the same time if she does not have this tumor removed, it will continue to grow and begin to obstruct the oral cavity and oropharynx such that she would need a trach and feeding tube and also have significant pain and bleeding as well as a malodorous problem in the oral cavity.  Ultimately, hearing both the possibilities of negative outcomes and the risks of leaving the tumor untreated or even doing palliative radiation, the patient and family seem to have settled on moving forward with surgery.  I counseled them extensively regarding the significant risk to her, and they are aware of this.  They understand also that this is a very  difficult situation given her age and the nature of the tumor which precluded an easy decision in terms of which way to move.  After all their questions were answered, surgery will be scheduled for next week.  They understand that she may very well need a feeding tube until she is able to eat properly orally.  I spent 30 minutes today, 25 of which was counseling and coordination of care, regarding the surgical decision making above.           Again, thank you for allowing me to participate in the care of your patient.      Sincerely,    Silverio Villalobos MD

## 2020-11-13 DIAGNOSIS — Z11.59 ENCOUNTER FOR SCREENING FOR OTHER VIRAL DISEASES: ICD-10-CM

## 2020-11-13 PROCEDURE — U0003 INFECTIOUS AGENT DETECTION BY NUCLEIC ACID (DNA OR RNA); SEVERE ACUTE RESPIRATORY SYNDROME CORONAVIRUS 2 (SARS-COV-2) (CORONAVIRUS DISEASE [COVID-19]), AMPLIFIED PROBE TECHNIQUE, MAKING USE OF HIGH THROUGHPUT TECHNOLOGIES AS DESCRIBED BY CMS-2020-01-R: HCPCS | Performed by: PATHOLOGY

## 2020-11-16 ENCOUNTER — ANESTHESIA (OUTPATIENT)
Dept: SURGERY | Facility: CLINIC | Age: 85
DRG: 138 | End: 2020-11-16
Payer: MEDICARE

## 2020-11-16 ENCOUNTER — APPOINTMENT (OUTPATIENT)
Dept: GENERAL RADIOLOGY | Facility: CLINIC | Age: 85
DRG: 138 | End: 2020-11-16
Attending: STUDENT IN AN ORGANIZED HEALTH CARE EDUCATION/TRAINING PROGRAM
Payer: MEDICARE

## 2020-11-16 ENCOUNTER — HOSPITAL ENCOUNTER (INPATIENT)
Facility: CLINIC | Age: 85
LOS: 2 days | Discharge: HOME HEALTH | DRG: 138 | End: 2020-11-18
Attending: OTOLARYNGOLOGY | Admitting: OTOLARYNGOLOGY
Payer: MEDICARE

## 2020-11-16 DIAGNOSIS — C05.0 CANCER OF HARD PALATE (H): ICD-10-CM

## 2020-11-16 DIAGNOSIS — Z98.890 POSTOPERATIVE STATE: ICD-10-CM

## 2020-11-16 DIAGNOSIS — C05.0 CANCER OF HARD PALATE (H): Primary | ICD-10-CM

## 2020-11-16 LAB
GLUCOSE BLDC GLUCOMTR-MCNC: 165 MG/DL (ref 70–99)
GLUCOSE BLDC GLUCOMTR-MCNC: 62 MG/DL (ref 70–99)
PLATELET # BLD AUTO: 151 10E9/L (ref 150–450)
SARS-COV-2 RNA SPEC QL NAA+PROBE: NOT DETECTED
SPECIMEN SOURCE: NORMAL

## 2020-11-16 PROCEDURE — 360N000028 HC SURGERY LEVEL 4 1ST 30 MIN - UMMC: Performed by: OTOLARYNGOLOGY

## 2020-11-16 PROCEDURE — 761N000004 HC RECOVERY PHASE 1 LEVEL 2 EA ADDTL HR: Performed by: OTOLARYNGOLOGY

## 2020-11-16 PROCEDURE — 74018 RADEX ABDOMEN 1 VIEW: CPT | Mod: 26 | Performed by: RADIOLOGY

## 2020-11-16 PROCEDURE — 258N000003 HC RX IP 258 OP 636: Performed by: STUDENT IN AN ORGANIZED HEALTH CARE EDUCATION/TRAINING PROGRAM

## 2020-11-16 PROCEDURE — 258N000001 HC RX 258: Performed by: ANESTHESIOLOGY

## 2020-11-16 PROCEDURE — 360N000029 HC SURGERY LEVEL 4 EA 15 ADDTL MIN - UMMC: Performed by: OTOLARYNGOLOGY

## 2020-11-16 PROCEDURE — 88305 TISSUE EXAM BY PATHOLOGIST: CPT | Mod: TC | Performed by: OTOLARYNGOLOGY

## 2020-11-16 PROCEDURE — 258N000003 HC RX IP 258 OP 636: Performed by: NURSE ANESTHETIST, CERTIFIED REGISTERED

## 2020-11-16 PROCEDURE — 258N000003 HC RX IP 258 OP 636: Performed by: OTOLARYNGOLOGY

## 2020-11-16 PROCEDURE — 999N000065 XR ABDOMEN PORT 1 VW

## 2020-11-16 PROCEDURE — 0CDWXZ1 EXTRACTION OF UPPER TOOTH, MULTIPLE, EXTERNAL APPROACH: ICD-10-PCS | Performed by: OTOLARYNGOLOGY

## 2020-11-16 PROCEDURE — 370N000002 HC ANESTHESIA TECHNICAL FEE, EACH ADDTL 15 MIN: Performed by: OTOLARYNGOLOGY

## 2020-11-16 PROCEDURE — 88331 PATH CONSLTJ SURG 1 BLK 1SPC: CPT | Mod: 26 | Performed by: PATHOLOGY

## 2020-11-16 PROCEDURE — 250N000011 HC RX IP 250 OP 636: Performed by: ANESTHESIOLOGY

## 2020-11-16 PROCEDURE — 250N000011 HC RX IP 250 OP 636: Performed by: PHYSICIAN ASSISTANT

## 2020-11-16 PROCEDURE — 120N000002 HC R&B MED SURG/OB UMMC

## 2020-11-16 PROCEDURE — 999N000015 HC STATISTIC ARTERIAL MONITORING DAILY

## 2020-11-16 PROCEDURE — 88300 SURGICAL PATH GROSS: CPT | Mod: 26 | Performed by: PATHOLOGY

## 2020-11-16 PROCEDURE — 88309 TISSUE EXAM BY PATHOLOGIST: CPT | Mod: TC | Performed by: OTOLARYNGOLOGY

## 2020-11-16 PROCEDURE — 42104 EXCISION LESION MOUTH ROOF: CPT | Mod: GC | Performed by: OTOLARYNGOLOGY

## 2020-11-16 PROCEDURE — 250N000011 HC RX IP 250 OP 636: Performed by: NURSE ANESTHETIST, CERTIFIED REGISTERED

## 2020-11-16 PROCEDURE — 88300 SURGICAL PATH GROSS: CPT | Mod: TC | Performed by: OTOLARYNGOLOGY

## 2020-11-16 PROCEDURE — 85049 AUTOMATED PLATELET COUNT: CPT | Performed by: STUDENT IN AN ORGANIZED HEALTH CARE EDUCATION/TRAINING PROGRAM

## 2020-11-16 PROCEDURE — 88309 TISSUE EXAM BY PATHOLOGIST: CPT | Mod: 26 | Performed by: PATHOLOGY

## 2020-11-16 PROCEDURE — 250N000013 HC RX MED GY IP 250 OP 250 PS 637: Performed by: STUDENT IN AN ORGANIZED HEALTH CARE EDUCATION/TRAINING PROGRAM

## 2020-11-16 PROCEDURE — 761N000003 HC RECOVERY PHASE 1 LEVEL 2 FIRST HR: Performed by: OTOLARYNGOLOGY

## 2020-11-16 PROCEDURE — 0CB20ZZ EXCISION OF HARD PALATE, OPEN APPROACH: ICD-10-PCS | Performed by: OTOLARYNGOLOGY

## 2020-11-16 PROCEDURE — 272N000001 HC OR GENERAL SUPPLY STERILE: Performed by: OTOLARYNGOLOGY

## 2020-11-16 PROCEDURE — 88331 PATH CONSLTJ SURG 1 BLK 1SPC: CPT | Mod: TC | Performed by: OTOLARYNGOLOGY

## 2020-11-16 PROCEDURE — 999N000139 HC STATISTIC PRE-PROCEDURE ASSESSMENT II: Performed by: OTOLARYNGOLOGY

## 2020-11-16 PROCEDURE — 250N000009 HC RX 250: Performed by: NURSE ANESTHETIST, CERTIFIED REGISTERED

## 2020-11-16 PROCEDURE — 36415 COLL VENOUS BLD VENIPUNCTURE: CPT | Performed by: STUDENT IN AN ORGANIZED HEALTH CARE EDUCATION/TRAINING PROGRAM

## 2020-11-16 PROCEDURE — 250N000011 HC RX IP 250 OP 636: Performed by: OTOLARYNGOLOGY

## 2020-11-16 PROCEDURE — 999N000157 HC STATISTIC RCP TIME EA 10 MIN

## 2020-11-16 PROCEDURE — 370N000001 HC ANESTHESIA TECHNICAL FEE, 1ST 30 MIN: Performed by: OTOLARYNGOLOGY

## 2020-11-16 PROCEDURE — 88305 TISSUE EXAM BY PATHOLOGIST: CPT | Mod: 26 | Performed by: PATHOLOGY

## 2020-11-16 PROCEDURE — 999N001017 HC STATISTIC GLUCOSE BY METER IP

## 2020-11-16 PROCEDURE — 250N000013 HC RX MED GY IP 250 OP 250 PS 637: Performed by: ANESTHESIOLOGY

## 2020-11-16 PROCEDURE — 250N000003 HC SEVOFLURANE, EA 15 MIN: Performed by: OTOLARYNGOLOGY

## 2020-11-16 RX ORDER — DEXAMETHASONE SODIUM PHOSPHATE 4 MG/ML
10 INJECTION, SOLUTION INTRA-ARTICULAR; INTRALESIONAL; INTRAMUSCULAR; INTRAVENOUS; SOFT TISSUE ONCE
Status: COMPLETED | OUTPATIENT
Start: 2020-11-16 | End: 2020-11-16

## 2020-11-16 RX ORDER — FENTANYL CITRATE 50 UG/ML
25-50 INJECTION, SOLUTION INTRAMUSCULAR; INTRAVENOUS
Status: DISCONTINUED | OUTPATIENT
Start: 2020-11-16 | End: 2020-11-16 | Stop reason: HOSPADM

## 2020-11-16 RX ORDER — LABETALOL HYDROCHLORIDE 5 MG/ML
INJECTION, SOLUTION INTRAVENOUS PRN
Status: DISCONTINUED | OUTPATIENT
Start: 2020-11-16 | End: 2020-11-16

## 2020-11-16 RX ORDER — ONDANSETRON 2 MG/ML
4 INJECTION INTRAMUSCULAR; INTRAVENOUS EVERY 6 HOURS PRN
Status: DISCONTINUED | OUTPATIENT
Start: 2020-11-16 | End: 2020-11-18 | Stop reason: HOSPADM

## 2020-11-16 RX ORDER — ONDANSETRON 4 MG/1
4 TABLET, ORALLY DISINTEGRATING ORAL EVERY 30 MIN PRN
Status: DISCONTINUED | OUTPATIENT
Start: 2020-11-16 | End: 2020-11-16 | Stop reason: HOSPADM

## 2020-11-16 RX ORDER — ACETAMINOPHEN 325 MG/1
650 TABLET ORAL EVERY 4 HOURS PRN
Status: DISCONTINUED | OUTPATIENT
Start: 2020-11-19 | End: 2020-11-18 | Stop reason: HOSPADM

## 2020-11-16 RX ORDER — HYDROMORPHONE HCL IN WATER/PF 6 MG/30 ML
0.2 PATIENT CONTROLLED ANALGESIA SYRINGE INTRAVENOUS EVERY 5 MIN PRN
Status: DISCONTINUED | OUTPATIENT
Start: 2020-11-16 | End: 2020-11-16 | Stop reason: HOSPADM

## 2020-11-16 RX ORDER — ONDANSETRON 2 MG/ML
4 INJECTION INTRAMUSCULAR; INTRAVENOUS EVERY 30 MIN PRN
Status: DISCONTINUED | OUTPATIENT
Start: 2020-11-16 | End: 2020-11-16 | Stop reason: HOSPADM

## 2020-11-16 RX ORDER — ONDANSETRON 2 MG/ML
INJECTION INTRAMUSCULAR; INTRAVENOUS PRN
Status: DISCONTINUED | OUTPATIENT
Start: 2020-11-16 | End: 2020-11-16

## 2020-11-16 RX ORDER — HEPARIN SODIUM 5000 [USP'U]/.5ML
5000 INJECTION, SOLUTION INTRAVENOUS; SUBCUTANEOUS EVERY 8 HOURS
Status: DISCONTINUED | OUTPATIENT
Start: 2020-11-17 | End: 2020-11-18 | Stop reason: HOSPADM

## 2020-11-16 RX ORDER — AMPICILLIN AND SULBACTAM 1; .5 G/1; G/1
1.5 INJECTION, POWDER, FOR SOLUTION INTRAMUSCULAR; INTRAVENOUS SEE ADMIN INSTRUCTIONS
Status: DISCONTINUED | OUTPATIENT
Start: 2020-11-16 | End: 2020-11-16 | Stop reason: HOSPADM

## 2020-11-16 RX ORDER — LANOLIN ALCOHOL/MO/W.PET/CERES
3 CREAM (GRAM) TOPICAL
Status: DISCONTINUED | OUTPATIENT
Start: 2020-11-17 | End: 2020-11-16

## 2020-11-16 RX ORDER — NALOXONE HYDROCHLORIDE 0.4 MG/ML
.1-.4 INJECTION, SOLUTION INTRAMUSCULAR; INTRAVENOUS; SUBCUTANEOUS
Status: DISCONTINUED | OUTPATIENT
Start: 2020-11-16 | End: 2020-11-16

## 2020-11-16 RX ORDER — LIDOCAINE HYDROCHLORIDE 20 MG/ML
INJECTION, SOLUTION INFILTRATION; PERINEURAL PRN
Status: DISCONTINUED | OUTPATIENT
Start: 2020-11-16 | End: 2020-11-16

## 2020-11-16 RX ORDER — DEXTROSE MONOHYDRATE, SODIUM CHLORIDE, AND POTASSIUM CHLORIDE 50; 1.49; 4.5 G/1000ML; G/1000ML; G/1000ML
INJECTION, SOLUTION INTRAVENOUS CONTINUOUS
Status: DISCONTINUED | OUTPATIENT
Start: 2020-11-16 | End: 2020-11-18 | Stop reason: HOSPADM

## 2020-11-16 RX ORDER — HYDROMORPHONE HCL IN WATER/PF 6 MG/30 ML
.1-.2 PATIENT CONTROLLED ANALGESIA SYRINGE INTRAVENOUS
Status: DISCONTINUED | OUTPATIENT
Start: 2020-11-16 | End: 2020-11-18 | Stop reason: HOSPADM

## 2020-11-16 RX ORDER — CHLORHEXIDINE GLUCONATE ORAL RINSE 1.2 MG/ML
15 SOLUTION DENTAL 4 TIMES DAILY
Status: DISCONTINUED | OUTPATIENT
Start: 2020-11-16 | End: 2020-11-18 | Stop reason: HOSPADM

## 2020-11-16 RX ORDER — LIDOCAINE 40 MG/G
CREAM TOPICAL
Status: CANCELLED | OUTPATIENT
Start: 2020-11-16

## 2020-11-16 RX ORDER — AMPICILLIN AND SULBACTAM 2; 1 G/1; G/1
3 INJECTION, POWDER, FOR SOLUTION INTRAMUSCULAR; INTRAVENOUS
Status: DISCONTINUED | OUTPATIENT
Start: 2020-11-16 | End: 2020-11-16 | Stop reason: HOSPADM

## 2020-11-16 RX ORDER — LANOLIN ALCOHOL/MO/W.PET/CERES
3 CREAM (GRAM) TOPICAL
Status: DISCONTINUED | OUTPATIENT
Start: 2020-11-16 | End: 2020-11-18 | Stop reason: HOSPADM

## 2020-11-16 RX ORDER — ONDANSETRON 4 MG/1
4 TABLET, ORALLY DISINTEGRATING ORAL EVERY 6 HOURS PRN
Status: DISCONTINUED | OUTPATIENT
Start: 2020-11-16 | End: 2020-11-18 | Stop reason: HOSPADM

## 2020-11-16 RX ORDER — PROPOFOL 10 MG/ML
INJECTION, EMULSION INTRAVENOUS PRN
Status: DISCONTINUED | OUTPATIENT
Start: 2020-11-16 | End: 2020-11-16

## 2020-11-16 RX ORDER — NITROGLYCERIN 10 MG/100ML
INJECTION INTRAVENOUS PRN
Status: DISCONTINUED | OUTPATIENT
Start: 2020-11-16 | End: 2020-11-16

## 2020-11-16 RX ORDER — AMOXICILLIN 250 MG
2 CAPSULE ORAL 2 TIMES DAILY
Status: DISCONTINUED | OUTPATIENT
Start: 2020-11-16 | End: 2020-11-18 | Stop reason: HOSPADM

## 2020-11-16 RX ORDER — OXYCODONE HYDROCHLORIDE 5 MG/1
5-10 TABLET ORAL EVERY 4 HOURS PRN
Status: DISCONTINUED | OUTPATIENT
Start: 2020-11-16 | End: 2020-11-16

## 2020-11-16 RX ORDER — LIDOCAINE 40 MG/G
CREAM TOPICAL
Status: DISCONTINUED | OUTPATIENT
Start: 2020-11-16 | End: 2020-11-18 | Stop reason: HOSPADM

## 2020-11-16 RX ORDER — SODIUM CHLORIDE, SODIUM LACTATE, POTASSIUM CHLORIDE, CALCIUM CHLORIDE 600; 310; 30; 20 MG/100ML; MG/100ML; MG/100ML; MG/100ML
INJECTION, SOLUTION INTRAVENOUS CONTINUOUS
Status: CANCELLED | OUTPATIENT
Start: 2020-11-16

## 2020-11-16 RX ORDER — SODIUM CHLORIDE, SODIUM LACTATE, POTASSIUM CHLORIDE, CALCIUM CHLORIDE 600; 310; 30; 20 MG/100ML; MG/100ML; MG/100ML; MG/100ML
INJECTION, SOLUTION INTRAVENOUS CONTINUOUS PRN
Status: DISCONTINUED | OUTPATIENT
Start: 2020-11-16 | End: 2020-11-16

## 2020-11-16 RX ORDER — SODIUM CHLORIDE, SODIUM LACTATE, POTASSIUM CHLORIDE, CALCIUM CHLORIDE 600; 310; 30; 20 MG/100ML; MG/100ML; MG/100ML; MG/100ML
INJECTION, SOLUTION INTRAVENOUS CONTINUOUS
Status: DISCONTINUED | OUTPATIENT
Start: 2020-11-16 | End: 2020-11-16 | Stop reason: HOSPADM

## 2020-11-16 RX ORDER — NALOXONE HYDROCHLORIDE 0.4 MG/ML
.1-.4 INJECTION, SOLUTION INTRAMUSCULAR; INTRAVENOUS; SUBCUTANEOUS
Status: DISCONTINUED | OUTPATIENT
Start: 2020-11-16 | End: 2020-11-18 | Stop reason: HOSPADM

## 2020-11-16 RX ORDER — DEXTROSE MONOHYDRATE 25 G/50ML
25 INJECTION, SOLUTION INTRAVENOUS ONCE
Status: COMPLETED | OUTPATIENT
Start: 2020-11-16 | End: 2020-11-16

## 2020-11-16 RX ORDER — PHENYLEPHRINE HCL IN 0.9% NACL 50MG/250ML
0.5-6 PLASTIC BAG, INJECTION (ML) INTRAVENOUS CONTINUOUS
Status: DISCONTINUED | OUTPATIENT
Start: 2020-11-16 | End: 2020-11-16 | Stop reason: HOSPADM

## 2020-11-16 RX ORDER — AMOXICILLIN 250 MG
1 CAPSULE ORAL 2 TIMES DAILY
Status: DISCONTINUED | OUTPATIENT
Start: 2020-11-16 | End: 2020-11-18 | Stop reason: HOSPADM

## 2020-11-16 RX ORDER — ACETAMINOPHEN 325 MG/1
975 TABLET ORAL EVERY 8 HOURS
Status: DISCONTINUED | OUTPATIENT
Start: 2020-11-16 | End: 2020-11-18 | Stop reason: HOSPADM

## 2020-11-16 RX ORDER — FENTANYL CITRATE 50 UG/ML
INJECTION, SOLUTION INTRAMUSCULAR; INTRAVENOUS PRN
Status: DISCONTINUED | OUTPATIENT
Start: 2020-11-16 | End: 2020-11-16

## 2020-11-16 RX ADMIN — FENTANYL CITRATE 25 MCG: 50 INJECTION, SOLUTION INTRAMUSCULAR; INTRAVENOUS at 12:15

## 2020-11-16 RX ADMIN — ONDANSETRON 4 MG: 2 INJECTION INTRAMUSCULAR; INTRAVENOUS at 13:16

## 2020-11-16 RX ADMIN — NITROGLYCERIN 50 MCG: 10 INJECTION INTRAVENOUS at 13:02

## 2020-11-16 RX ADMIN — PHENYLEPHRINE HYDROCHLORIDE 0.5 MCG/KG/MIN: 10 INJECTION INTRAVENOUS at 12:19

## 2020-11-16 RX ADMIN — DEXAMETHASONE SODIUM PHOSPHATE 10 MG: 4 INJECTION, SOLUTION INTRA-ARTICULAR; INTRALESIONAL; INTRAMUSCULAR; INTRAVENOUS; SOFT TISSUE at 12:28

## 2020-11-16 RX ADMIN — ACETAMINOPHEN 975 MG: 325 TABLET, FILM COATED ORAL at 20:06

## 2020-11-16 RX ADMIN — PROPOFOL 20 MG: 10 INJECTION, EMULSION INTRAVENOUS at 13:19

## 2020-11-16 RX ADMIN — NITROGLYCERIN 100 MCG: 10 INJECTION INTRAVENOUS at 12:54

## 2020-11-16 RX ADMIN — FENTANYL CITRATE 25 MCG: 50 INJECTION, SOLUTION INTRAMUSCULAR; INTRAVENOUS at 14:59

## 2020-11-16 RX ADMIN — AMPICILLIN SODIUM AND SULBACTAM SODIUM 3 G: 1; .5 INJECTION, POWDER, FOR SOLUTION INTRAMUSCULAR; INTRAVENOUS at 12:32

## 2020-11-16 RX ADMIN — PROPOFOL 10 MG: 10 INJECTION, EMULSION INTRAVENOUS at 12:18

## 2020-11-16 RX ADMIN — NITROGLYCERIN 50 MCG: 10 INJECTION INTRAVENOUS at 12:40

## 2020-11-16 RX ADMIN — PROPOFOL 20 MG: 10 INJECTION, EMULSION INTRAVENOUS at 12:17

## 2020-11-16 RX ADMIN — PROPOFOL 20 MG: 10 INJECTION, EMULSION INTRAVENOUS at 12:41

## 2020-11-16 RX ADMIN — NOREPINEPHRINE BITARTRATE 3.2 MCG: 1 INJECTION, SOLUTION, CONCENTRATE INTRAVENOUS at 12:16

## 2020-11-16 RX ADMIN — ROCURONIUM BROMIDE 30 MG: 10 INJECTION INTRAVENOUS at 12:17

## 2020-11-16 RX ADMIN — MELATONIN TAB 3 MG 3 MG: 3 TAB at 22:49

## 2020-11-16 RX ADMIN — LABETALOL HYDROCHLORIDE 5 MG: 5 INJECTION INTRAVENOUS at 13:06

## 2020-11-16 RX ADMIN — NITROGLYCERIN 100 MCG: 10 INJECTION INTRAVENOUS at 12:47

## 2020-11-16 RX ADMIN — DEXTROSE MONOHYDRATE 25 ML: 500 INJECTION PARENTERAL at 11:41

## 2020-11-16 RX ADMIN — SODIUM CHLORIDE, POTASSIUM CHLORIDE, SODIUM LACTATE AND CALCIUM CHLORIDE: 600; 310; 30; 20 INJECTION, SOLUTION INTRAVENOUS at 12:15

## 2020-11-16 RX ADMIN — FENTANYL CITRATE 25 MCG: 50 INJECTION, SOLUTION INTRAMUSCULAR; INTRAVENOUS at 14:34

## 2020-11-16 RX ADMIN — POTASSIUM CHLORIDE, DEXTROSE MONOHYDRATE AND SODIUM CHLORIDE: 150; 5; 450 INJECTION, SOLUTION INTRAVENOUS at 14:30

## 2020-11-16 RX ADMIN — NITROGLYCERIN 50 MCG: 10 INJECTION INTRAVENOUS at 12:51

## 2020-11-16 RX ADMIN — CHLORHEXIDINE GLUCONATE 0.12% ORAL RINSE 15 ML: 1.2 LIQUID ORAL at 20:37

## 2020-11-16 RX ADMIN — NITROGLYCERIN 50 MCG: 10 INJECTION INTRAVENOUS at 12:50

## 2020-11-16 RX ADMIN — PROPOFOL 30 MG: 10 INJECTION, EMULSION INTRAVENOUS at 12:37

## 2020-11-16 RX ADMIN — NITROGLYCERIN 50 MCG: 10 INJECTION INTRAVENOUS at 12:44

## 2020-11-16 RX ADMIN — SUGAMMADEX 100 MG: 100 INJECTION, SOLUTION INTRAVENOUS at 13:25

## 2020-11-16 RX ADMIN — PROPOFOL 10 MG: 10 INJECTION, EMULSION INTRAVENOUS at 12:19

## 2020-11-16 RX ADMIN — LIDOCAINE HYDROCHLORIDE 40 MG: 20 INJECTION, SOLUTION INFILTRATION; PERINEURAL at 12:17

## 2020-11-16 RX ADMIN — FENTANYL CITRATE 25 MCG: 50 INJECTION, SOLUTION INTRAMUSCULAR; INTRAVENOUS at 12:38

## 2020-11-16 ASSESSMENT — ACTIVITIES OF DAILY LIVING (ADL): ADLS_ACUITY_SCORE: 17

## 2020-11-16 ASSESSMENT — MIFFLIN-ST. JEOR: SCORE: 809.13

## 2020-11-16 NOTE — BRIEF OP NOTE
[facility]     Brief Operative Note    Pre-operative diagnosis: Cancer of hard palate (H) [C05.0]  Post-operative diagnosis Same as pre-operative diagnosis    Procedure: Procedure(s):  Wide local excision of hard palate  Surgeon: Surgeon(s) and Role:     * Silverio Villalobos MD - Primary     * Leo Stafford MD - Resident - Assisting  Anesthesia: General   Estimated blood loss: 30 mL  Drains: None  Specimens:   ID Type Source Tests Collected by Time Destination   A : Posterior margin Tissue Mouth SURGICAL PATHOLOGY EXAM Silverio Villalobos MD 11/16/2020 12:38 PM    B : Lateral margin Tissue Mouth SURGICAL PATHOLOGY EXAM Silverio Villalobos MD 11/16/2020 12:39 PM    C : medial margin Tissue Mouth SURGICAL PATHOLOGY EXAM Silverio Villalobos MD 11/16/2020 12:39 PM    D : anterior margin Tissue Mouth SURGICAL PATHOLOGY EXAM Silverio Villalobos MD 11/16/2020 12:39 PM    E : tooth Tissue Other SURGICAL PATHOLOGY EXAM Silverio Villalobos MD 11/16/2020 12:43 PM    F : Wide local excision of right palate Tissue Other SURGICAL PATHOLOGY EXAM Silverio Villalobos MD 11/16/2020  1:08 PM      Findings:   Lesion involving the right posterior hard palate and maxillary alveolar ridge. Dental extraction x 2. Frozen margins negative. Greater palatine artery identified and ligated. Xeroform bolster sutured in place. NG placed and sutured to the septum.  Complications: None apparent.  Implants: * No implants in log *      A/P: Anca Alanis is a 89 year old female with a past medical history of dementia and aortic stenosis, now POD#0 s/p WLE of right hard palate lesion and NG placement.    Neuro:  - Pain control: tylenol/oxycodone/dilaudid  - PTA memantine    HEENT:  - Xeroform bolster in place, to be removed in clinic  - Peridex QID    Respiratory:  - supplemental O2 PRN to keep sats >92%    CV/heme:  - HDS  - Hx of aortic stenosis: medicine consult, cardiac telemetry  overnight    FEN/GI:  - Advance diet to FLD  - AXR to confirm NG placement  - Nutrition consult  - can pull NG when taking adequate PO  - Bowel regimen: senna    :  - straight cath per protocol    Endo  - no active issues    ID:  - Monitor for signs/symptoms of infection     Consults:  - PT/OT  - Nutrition  - Medicine    PPx:  - Heparin subcutaneous on POD1  - SCDs  - IS    Dispo: Floor

## 2020-11-16 NOTE — PROGRESS NOTES
1100 Dr. Mat DIANE contacted with update that Patient's Blood Glucose is 62 and systolic ; Dr. Rivero also updated that Patient's pre-procedure EKG 11/6/20 was irregular; Dr. Rivero at bedside; will continue to monitor.    1122 Dr. Rivero discussing Code status with Patient and Patient's  and Home Health RN.     1130 Dr. Curtis ENT/ONT Resident contacted with update that Patient and Patient's  and Home Health RN are requesting that Patient stays the night after surgery - Dr. Curtis deferred question to Dr. Villalobos; he will be paged at this time;     1135 Dr. Villalobos at bedside.    1140 Dr. Mat DIANE ordered 25mL Dextrose 50% - due to BG 62; administered at this time; will recheck in 15min.  1154Recheck Blood Glucose recheck 165.

## 2020-11-16 NOTE — OP NOTE
Procedure Date: 11/16/2020      ATTENDING SURGEON:  Silverio Villalobos MD      RESIDENT SURGEON:  Leo Stafford MD      PREOPERATIVE DIAGNOSIS:  Squamous cell carcinoma of the hard palate.      POSTOPERATIVE DIAGNOSIS:  Squamous cell carcinoma of the hard palate.      PROCEDURES PERFORMED:   1.  Wide local excision of a hard palate squamous cell carcinoma.   2.  Nasogastric tube placement under direct visualization.      ANESTHESIA:  General.      ESTIMATED BLOOD LOSS:  30 mL      SPECIMENS:  None.      COMPLICATIONS:  None apparent.      FINDINGS:  Lesion involving the right posterior hard palate maxillary alveolar ridge.  Dental extraction x2.  Frozen margins are negative.  The greater palatine artery was identified and ligated.  Xeroform bolster was sutured in place.  NG was placed under direct visualization and sutured it to the septum.      INDICATIONS:  Anca Alanis is a 89-year-old female with a past medical history of dementia and aortic stenosis who noted a lesion on her hard palate.  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  CT scans demonstrated questionable bony erosion, however, the area of bony dehiscence in the maxillary sinus was symmetric and felt to be anatomic variant.  The risks, benefits and alternatives to the above interventions were discussed with the patient and she wished to proceed.      DESCRIPTION OF PROCEDURE:  After obtaining informed consent, the patient was brought back to the operating room and positioned supine on the operating table.  General anesthesia was induced and the patient was orotracheally intubated.  The mouth was cleaned with Peridex solution.  The patient was draped in standard fashion.  A timeout was performed and all parties in agreement.  Dental extractions were performed in order to adequately visualize the extent of the tumor.  The mass was excised using a combination of monopolar cautery and a periosteal elevator with an  approximately 1 cm margin around the periphery.  Frozen margins were obtained, were negative.  The greater palatine artery was encountered during excision and ligated with a clip and bipolar cautery.  Hemostasis was achieved.  Xeroform bolster was placed and sutured to the palate with silk sutures.  The throat pack was removed and all counts were correct.  An NG tube was placed easily to 58 cm and sutured it to the septum.  This concluded the procedure.  The patient's care was turned over to our Anesthesia colleagues for postoperative care.      Dr. Silverio Barrientos was present and participated in all critical portions of the procedure.         SILVERIO BARRIENTOS MD       As dictated by DIANE MEDELLIN MD       I was present for the entire procedure  Silverio Barrientos M.D.       D: 2020   T: 2020   MT:       Name:     MAREN BRADLEY   MRN:      [id number]        Account:        [id number]   :      1924           Procedure Date: 2020      Document: [id number]

## 2020-11-16 NOTE — ANESTHESIA CARE TRANSFER NOTE
Patient: Anca Alanis    Procedure(s):  Wide local excision of hard palate    Diagnosis: Cancer of hard palate (H) [C05.0]  Diagnosis Additional Information: No value filed.    Anesthesia Type:   No value filed.     Note:    Patient transferred to:PACU  Comments: Pt remains stable, monitors on alarms in place, report to PACU RN, no complicationsHandoff Report: Identifed the Patient, Identified the Reponsible Provider, Reviewed the pertinent medical history, Discussed the surgical course, Reviewed Intra-OP anesthesia mangement and issues during anesthesia, Set expectations for post-procedure period and Allowed opportunity for questions and acknowledgement of understanding      Vitals: (Last set prior to Anesthesia Care Transfer)    CRNA VITALS  11/16/2020 1306 - 11/16/2020 1345      11/16/2020             Pulse:  64    ART BP:  196/48    ART Mean:  103    SpO2:  99 %                Electronically Signed By: PACO Nash CRNA  November 16, 2020  1:45 PM

## 2020-11-16 NOTE — ANESTHESIA PROCEDURE NOTES
Airway   Date/Time: 11/16/2020 12:20 PM   Patient location during procedure: OR    Staff -   Anesthesiologist:  Sergo Rivero MD  CRNA: Ace Ventura APRN CRNA  Performed By: resident    Indications and Patient Condition  Indications for airway management: martha-procedural  Induction type:intravenousMask difficulty assessment: 1 - vent by mask    Final Airway Details  Final airway type: endotracheal airway  Successful airway:Reinforced tube  Endotracheal Airway Details   ETT size (mm): 6.0  Cuffed: yes  Successful intubation technique: video laryngoscopy  Adjucts: stylet  Measured from: lips  Secured at (cm): 23  Secured with: pink tape  Bite block used: None    Post intubation assessment   Placement verified by: capnometry, equal breath sounds and chest rise   Number of attempts at approach: 1  Secured with:pink tape  Ease of procedure: easy  Dentition: Intact and Unchanged

## 2020-11-16 NOTE — ANESTHESIA PROCEDURE NOTES
Arterial Line Procedure Note      Staff -   Anesthesiologist:  Sergo Rivero MD  Performed By: anesthesiologist    Location: In OR After Induction  Procedure Start/Stop Times:     patient identified, IV checked, site marked, risks and benefits discussed, informed consent, monitors and equipment checked, pre-op evaluation and at physician/surgeon's request      Correct Patient: Yes      Correct Position: Yes      Correct Site: Yes      Correct Procedure: Yes      Correct Laterality:  N/A    Site Marked:  N/A  Line Placement:     Procedure:  Arterial Line    Insertion Site:  Radial    Insertion laterality:  Right    Skin Prep: Chloraprep      Patient Prep: patient draped, mask, sterile gloves, hat and hand hygiene      Local skin infiltration:  1% lidocaine    amount (mL):  2    Ultrasound Guided?: Yes      Artery evaluated via ultrasound confirming patency.   Using realtime imaging, the artery was punctured and the needle was observed entering the artery.      A permanent image is entered into patient's chart.      Catheter size:  20 gauge, 12 cm    Dressing:  Tegaderm    Complications:  None obvious    Arterial waveform: Yes      IBP within 10% of NIBP: Yes

## 2020-11-17 ENCOUNTER — TELEPHONE (OUTPATIENT)
Dept: OTOLARYNGOLOGY | Facility: CLINIC | Age: 85
End: 2020-11-17

## 2020-11-17 ENCOUNTER — APPOINTMENT (OUTPATIENT)
Dept: OCCUPATIONAL THERAPY | Facility: CLINIC | Age: 85
DRG: 138 | End: 2020-11-17
Attending: STUDENT IN AN ORGANIZED HEALTH CARE EDUCATION/TRAINING PROGRAM
Payer: MEDICARE

## 2020-11-17 LAB
ANION GAP SERPL CALCULATED.3IONS-SCNC: 5 MMOL/L (ref 3–14)
BUN SERPL-MCNC: 25 MG/DL (ref 7–30)
CALCIUM SERPL-MCNC: 8.5 MG/DL (ref 8.5–10.1)
CHLORIDE SERPL-SCNC: 108 MMOL/L (ref 94–109)
CO2 SERPL-SCNC: 25 MMOL/L (ref 20–32)
CREAT SERPL-MCNC: 0.8 MG/DL (ref 0.52–1.04)
ERYTHROCYTE [DISTWIDTH] IN BLOOD BY AUTOMATED COUNT: 13.2 % (ref 10–15)
GFR SERPL CREATININE-BSD FRML MDRD: 62 ML/MIN/{1.73_M2}
GLUCOSE BLDC GLUCOMTR-MCNC: 158 MG/DL (ref 70–99)
GLUCOSE SERPL-MCNC: 158 MG/DL (ref 70–99)
HCT VFR BLD AUTO: 31.2 % (ref 35–47)
HGB BLD-MCNC: 9.9 G/DL (ref 11.7–15.7)
MAGNESIUM SERPL-MCNC: 1.9 MG/DL (ref 1.6–2.3)
MCH RBC QN AUTO: 32.2 PG (ref 26.5–33)
MCHC RBC AUTO-ENTMCNC: 31.7 G/DL (ref 31.5–36.5)
MCV RBC AUTO: 102 FL (ref 78–100)
PHOSPHATE SERPL-MCNC: 3.2 MG/DL (ref 2.5–4.5)
PLATELET # BLD AUTO: 160 10E9/L (ref 150–450)
POTASSIUM SERPL-SCNC: 4.5 MMOL/L (ref 3.4–5.3)
RBC # BLD AUTO: 3.07 10E12/L (ref 3.8–5.2)
SODIUM SERPL-SCNC: 138 MMOL/L (ref 133–144)
WBC # BLD AUTO: 7.3 10E9/L (ref 4–11)

## 2020-11-17 PROCEDURE — 250N000011 HC RX IP 250 OP 636: Performed by: STUDENT IN AN ORGANIZED HEALTH CARE EDUCATION/TRAINING PROGRAM

## 2020-11-17 PROCEDURE — 999N001017 HC STATISTIC GLUCOSE BY METER IP

## 2020-11-17 PROCEDURE — 80048 BASIC METABOLIC PNL TOTAL CA: CPT | Performed by: OTOLARYNGOLOGY

## 2020-11-17 PROCEDURE — 99222 1ST HOSP IP/OBS MODERATE 55: CPT | Performed by: PHYSICIAN ASSISTANT

## 2020-11-17 PROCEDURE — 85027 COMPLETE CBC AUTOMATED: CPT | Performed by: OTOLARYNGOLOGY

## 2020-11-17 PROCEDURE — 36415 COLL VENOUS BLD VENIPUNCTURE: CPT | Performed by: OTOLARYNGOLOGY

## 2020-11-17 PROCEDURE — 83735 ASSAY OF MAGNESIUM: CPT | Performed by: OTOLARYNGOLOGY

## 2020-11-17 PROCEDURE — 84100 ASSAY OF PHOSPHORUS: CPT | Performed by: OTOLARYNGOLOGY

## 2020-11-17 PROCEDURE — 120N000002 HC R&B MED SURG/OB UMMC

## 2020-11-17 PROCEDURE — 250N000013 HC RX MED GY IP 250 OP 250 PS 637: Performed by: STUDENT IN AN ORGANIZED HEALTH CARE EDUCATION/TRAINING PROGRAM

## 2020-11-17 PROCEDURE — 97165 OT EVAL LOW COMPLEX 30 MIN: CPT | Mod: GO | Performed by: OCCUPATIONAL THERAPIST

## 2020-11-17 PROCEDURE — 97535 SELF CARE MNGMENT TRAINING: CPT | Mod: GO | Performed by: OCCUPATIONAL THERAPIST

## 2020-11-17 PROCEDURE — 258N000003 HC RX IP 258 OP 636: Performed by: STUDENT IN AN ORGANIZED HEALTH CARE EDUCATION/TRAINING PROGRAM

## 2020-11-17 PROCEDURE — 99207 PR CONSULT E&M CHANGED TO INITIAL LEVEL: CPT | Performed by: PHYSICIAN ASSISTANT

## 2020-11-17 RX ORDER — OXYCODONE HYDROCHLORIDE 5 MG/1
2.5 TABLET ORAL EVERY 4 HOURS PRN
Qty: 15 TABLET | Refills: 0 | Status: SHIPPED | OUTPATIENT
Start: 2020-11-17 | End: 2021-04-14

## 2020-11-17 RX ADMIN — CHLORHEXIDINE GLUCONATE 0.12% ORAL RINSE 15 ML: 1.2 LIQUID ORAL at 08:22

## 2020-11-17 RX ADMIN — HEPARIN SODIUM 5000 UNITS: 10000 INJECTION, SOLUTION INTRAVENOUS; SUBCUTANEOUS at 16:26

## 2020-11-17 RX ADMIN — ACETAMINOPHEN 975 MG: 325 TABLET, FILM COATED ORAL at 03:19

## 2020-11-17 RX ADMIN — CHLORHEXIDINE GLUCONATE 0.12% ORAL RINSE 15 ML: 1.2 LIQUID ORAL at 16:26

## 2020-11-17 RX ADMIN — MELATONIN TAB 3 MG 3 MG: 3 TAB at 18:40

## 2020-11-17 RX ADMIN — ACETAMINOPHEN 975 MG: 325 TABLET, FILM COATED ORAL at 12:22

## 2020-11-17 RX ADMIN — CHLORHEXIDINE GLUCONATE 0.12% ORAL RINSE 15 ML: 1.2 LIQUID ORAL at 12:26

## 2020-11-17 RX ADMIN — CHLORHEXIDINE GLUCONATE 0.12% ORAL RINSE 15 ML: 1.2 LIQUID ORAL at 20:05

## 2020-11-17 RX ADMIN — HEPARIN SODIUM 5000 UNITS: 10000 INJECTION, SOLUTION INTRAVENOUS; SUBCUTANEOUS at 08:21

## 2020-11-17 RX ADMIN — POTASSIUM CHLORIDE, DEXTROSE MONOHYDRATE AND SODIUM CHLORIDE: 150; 5; 450 INJECTION, SOLUTION INTRAVENOUS at 03:29

## 2020-11-17 RX ADMIN — DOCUSATE SODIUM 50 MG AND SENNOSIDES 8.6 MG 2 TABLET: 8.6; 5 TABLET, FILM COATED ORAL at 08:22

## 2020-11-17 RX ADMIN — ACETAMINOPHEN 975 MG: 325 TABLET, FILM COATED ORAL at 20:05

## 2020-11-17 ASSESSMENT — ACTIVITIES OF DAILY LIVING (ADL)
ADLS_ACUITY_SCORE: 17
ADLS_ACUITY_SCORE: 19

## 2020-11-17 NOTE — TELEPHONE ENCOUNTER
Returned call to patient's son indicating that writer is not aware of discharge plan but it will be determined by her intake today. Writer did provide 6A phone number for him to call to discuss with nursing team. Son was appreciative of return call and was encouraged to call with further questions or concerns.     Ambar Aguilar, RN, BSN

## 2020-11-17 NOTE — PLAN OF CARE
Status: POD #1 s/p excision of a hard palate squamous cell carcinoma and Nasogastric tube placement  Vitals: VSS on 1L NC, intermittent bradycardia (50-60bpm) while resting   Neuros: AxOx3, d/o to time. Forgetful. [place] bilaterally (pocket talker ordered), 4/5 throughout.   IV: L. PIV, SL. R. PIV infusing D5 1/2 with 20mEq K @75mL/hr.   Resp/trach: LS diminished at bases.   Diet: Full liquid diet. NG in place and clamped-can pull when taking adequate PO  Bowel status: BS+. No BM this shift. LBM 11/15 per pt report.   : Voiding spontaneously via BSC.   Skin: R mouth incision w/ sutures and bolster dressing. R. Tongue with brusing. Bruising present t/o.   Pain: No c/o pain overnight, taking scheduled tylenol   Activity: A1 pivot to BSC. Normally up ind with cane at home   Plan: Continue to monitor and follow POC.    **If not enough adequate PO intake by mid morning please contact ENT to initiate french counts!

## 2020-11-17 NOTE — PROGRESS NOTES
Status: POD #1 s/p excision of a hard palate squamous cell carcinoma and Nasogastric tube placement  Vitals: VSS on RA.  Intermittent bradycardia.    Neuros: A&Ox3. D/o to date.  Forgetful.  Very [place] bilaterally (pocket talker ordered).  4/5 strength throughout  IV: PIV S/L, Good po  Resp/trach: WDL  Diet: full liquid diet. Good PO.  Refer to flowsheet for more details.  NG in place, clamped.  If continues to have good PO, NG tube may be able to be pulled out.  Orders pending  Bowel status: BS+. BM this shift  : voiding spont, via commode  Skin: R mouth incision w/sutures and bolster dressing.  R tongue bruising.  Bruising throughout  Pain: Pain managed well with scheduled tylenoll  Activity: A1, pivot to commode.    Plan: continue with current POC.

## 2020-11-17 NOTE — OR NURSING
Dr. Morel at bedside in PACU, verbalized pt does not need telemetry overnight.    Dr. Stafford at bedside from ENT in PACU, verbalized pt does not need telemetry per anesthesia recommendation.

## 2020-11-17 NOTE — PROGRESS NOTES
"Otolaryngology Progress Note  November 17, 2020    S: No acute events overnight. Reports she is sore this morning. Denies any nausea. She wants to try eating this morning.    O: /54 (BP Location: Right arm)   Pulse 63   Temp 95.9  F (35.5  C) (Axillary)   Resp 16   Ht 1.6 m (5' 3\")   Wt 45 kg (99 lb 3.3 oz)   LMP  (LMP Unknown)   SpO2 97%   BMI 17.57 kg/m     General: Alert and oriented x 3, No acute distress   HEENT: EOMI. HB 1/6. Xeroform bolster in place. No oral bleeding. NG sutured to the septum.    Pulmonary: Breathing non-labored, no stridor, no accessory muscle use.    LABS:  ROUTINE IP LABS (Last four results)  BMPNo lab results found in last 7 days.  CBC  Recent Labs   Lab 11/16/20 2006        INRNo lab results found in last 7 days.      A/P: Anca Alanis is a 89 year old female with a past medical history of dementia and aortic stenosis, now POD#1 s/p WLE of right hard palate lesion and NG placement.     Neuro:  - Pain control: tylenol/oxycodone/dilaudid  - PTA memantine     HEENT:  - Xeroform bolster in place, to be removed in clinic  - Peridex QID     Respiratory:  - supplemental O2 PRN to keep sats >92%     CV/heme:  - HDS  - Hx of aortic stenosis: medicine consult, cardiac telemetry overnight     FEN/GI:  - FLD. Boost supplements ordered.  - Nutrition consult  - can pull NG when taking adequate PO, if unable to take adequate PO may need enteral feedings.   - Bowel regimen: senna     :  - straight cath per protocol     Endo  - no active issues     ID:  - Monitor for signs/symptoms of infection      Consults:  - PT/OT  - Nutrition  - Medicine     PPx:  - Heparin subcutaneous on POD1  - SCDs  - IS     Dispo: 6A    -- Patient and above plan to be discussed with Dr. Griselda Stafford MD  Otolaryngology-Head & Neck Surgery PGY3  Please contact ENT with questions by dialing [phone] and entering job code [phone] when prompted.    "

## 2020-11-17 NOTE — PLAN OF CARE
6A PT - Defer. PT orders acknowledged and appreciated. Per discussion with OT and chart review, pt does not demonstrate IP PT needs. Pt with dementia and mobilizing near baseline. Pt with no acute change in mobility status and would benefit most by returning to prior/familiar living arrangement. OT to follow. PT to complete orders.

## 2020-11-17 NOTE — PROGRESS NOTES
11/17/20 1013   Quick Adds   Type of Visit Initial Occupational Therapy Evaluation   Living Environment   People in home spouse   Current Living Arrangements apartment   Home Accessibility   (has tub shower)   Transportation Anticipated car, drives self;family or friend will provide   Living Environment Comments Pt reports her  is home to A  (per chart review pt has a caretaker as well)   Self-Care   Usual Activity Tolerance fair   Current Activity Tolerance fair   Regular Exercise No   Equipment Currently Used at Home none   Activity/Exercise/Self-Care Comment Pt reports some sort of in home exercise every week and has a    Disability/Function   Hearing Difficulty or Deaf yes   Patient's preferred means of communication English speaker with hearing loss, no speech problems.   Describe hearing loss bilateral hearing loss   Wear Glasses or Blind no   Concentrating, Remembering or Making Decisions Difficulty yes   Concentration Management   (Pt with history of dementia)   Difficulty Communicating no   Dressing/Bathing Difficulty yes   Dressing/Bathing bathing difficulty, assistance 1 person   Toileting no   Doing Errands Independently Difficulty (such as shopping) no   Fall history within last six months no   General Information   Onset of Illness/Injury or Date of Surgery 11/16/20   Referring Physician Leo Stafford MD   Patient/Family Therapy Goal Statement (OT) To go back home   Additional Occupational Profile Info/Pertinent History of Current Problem Anca Alanis is a 89 year old female with history of dementia, chronic anemia, and recently diagnosed aortic stenosis who was admitted on 11/16/2020 following wide local excision of a hard palate lesion by Dr. Villalobos of ENT.    Limitations/Impairments safety/cognitive   Cognitive Status Examination   Orientation Status person;place   Visual Perception   Visual Impairment/Limitations WNL   Sensory   Sensory Quick Adds No deficits were  identified   Pain Assessment   Patient Currently in Pain No   Integumentary/Edema   Integumentary/Edema no deficits were identifed   Strength Comprehensive (MMT)   Comment, General Manual Muscle Testing (MMT) Assessment WFL   Muscle Tone Assessment   Muscle Tone Quick Adds No deficits were identified   Coordination   Upper Extremity Coordination No deficits were identified   Activities of Daily Living   BADL Assessment/Intervention lower body dressing;bathing;grooming;toileting   Bathing Assessment/Intervention   [place] Level (Bathing) minimum assist (75% patient effort)   Lower Body Dressing Assessment/Training   [place] Level (Lower Body Dressing) supervision   Grooming Assessment/Training   [place] Level (Grooming) supervision   Toileting   [place] Level (Toileting) minimum assist (75% patient effort)   Instrumental Activities of Daily Living (IADL)   Previous Responsibilities   (Pt had A with IADLs)   Clinical Impression   Criteria for Skilled Therapeutic Interventions Met (OT) yes   OT Diagnosis decreased ADL I and tolerance   OT Problem List-Impairments impacting ADL cognition   Assessment of Occupational Performance 1-3 Performance Deficits   Identified Performance Deficits leisure, home mgmt   Planned Therapy Interventions (OT) ADL retraining;home program guidelines;progressive activity/exercise   Clinical Decision Making Complexity (OT) low complexity   Therapy Frequency (OT) 5x/week   Predicted Duration of Therapy 11/20/2020   Anticipated Equipment Needs Upon Discharge (OT)   (none)   Risks and Benefits of Treatment have been explained. Yes   Patient, Family & other staff in agreement with plan of care Yes   Comment-Clinical Impression Pt may benefit from skilled OT to help increase ADL I and tolerance   OT Discharge Planning    OT Discharge Recommendation (DC Rec) Home with assist;home with home care occupational therapy   OT Rationale for DC Rec Pt likely near her baseline ADL I,  would benefit from home OT to help maximize ADL safety and Ind.    Total Evaluation Time (Minutes)   Total Evaluation Time (Minutes) 5

## 2020-11-17 NOTE — CONSULTS
[facility]   Consult Note - Hospitalist Service, Gold 8       Date of Admission:  11/16/2020    Consult Requested by: Dr. Leo Stafford  Reason for Consult: Medical co-management    Assessment & Plan   Anca Alanis is a 89 year old female with history of dementia, chronic anemia, and recently diagnosed aortic stenosis who was admitted on 11/16/2020 following wide local excision of a hard palate lesion by Dr. Villalobos of ENT.     1. Hard palate lesion s/p wide local excision (1/16/20):  Management per primary team.    2. Moderate to severe aortic stenosis:  Diagnosed during pre-op evaluation.  Echo 11/6 noted severe aortic valve calcifications with mean gradient of 10.2 mmHg and valve area of 1 cm2.  Mean gradient felt to be secondary to low flow and that patient has more moderate to severe disease. Echo also noted normal LV systolic and diastolic function (EF 60-65%), as well as dilated IVC with abnormal respiratory variation. Patient does not have signs or symptoms of heart failure. No dyspnea, chest pain, dizziness, or syncope. Appears euvolemic on exam. Weight is stable from pre-op examination.   - No acute interventions indicated at this time  - Monitor I/O's and daily weights  - Page medicine for acute chest pain, dyspnea, or evidence of fluid overload on exam    3. Acute on chronic anemia:  Baseline Hgb 10-11 range, most recently 11.5 on 11/5. Repeat Hgb 9.9 on POD#1. May be normal variation vs acute blood loss from surgery.  - Recommend Hgb daily  - Transfuse if Hgb <7.0    4. Dementia:  Stable. No evidence of acute encephalopathy or delirium at this time. High risk for encephalopathy post-op d/t baseline dementia.   - Would empirically start delirium precautions  - OK to hold PTA namenda and restart on discharge  - Avoid sedating meds as able      Thank you for the opportunity to participate in the care of Mrs. Alanis.      No further recommendations  or need for medical follow up at this time. Please page on-call provider for any additional medical concerns.     Medicine will sign off.      The patient's care was discussed with the Bedside Nurse and Patient.    Omar Loyd PA-C  [facility]   Please see sign in/sign out for up to date coverage information  ______________________________________________________________________      History of Present Illness   Anca Alanis is a 89 year old female who was admitted to the ENT service s/p wide local excision of a R hard palate lesion.  She has a history of dementia, chronic anemia, and recently diagnosed aortic stenosis, which was discovered during her preoperative risk assessment. Given her increased risk of complications perioperatively, medicine has been consulted for co-management.     Patient reports feeling much better today. She denies any acute complaints.  No pain. No acute events overnight per nursing. She just finished breakfast and tolerated PO intake well.     Patient denies any history of cardiopulmonary issues. She denies any history of dyspnea, CORTES, peripheral edema, orthopnea, PND, chest pain, dizziness, or syncope. She notes feeling more fatigued as of late, with less exercise tolerance. She attributes this to decreased physical activity due to the COVID pandemic (they previously had a  come to her building for exercises).     Review of Systems   The 10 point Review of Systems is negative other than noted in the HPI or here.     Past Medical History    I have reviewed this patient's medical history and updated it with pertinent information if needed.   Past Medical History:   Diagnosis Date     Aortic stenosis      Cataracts, both eyes      Dementia (H)      Glaucoma suspect        Past Surgical History   I have reviewed this patient's surgical history and updated it with pertinent information if needed.  Past Surgical  History:   Procedure Laterality Date     EXCISE LESION INTRAORAL Right 2020    Procedure: Wide local excision of hard palate;  Surgeon: Silverio Villalobos MD;  Location:  OR       Social History   I have reviewed this patient's social history and updated it with pertinent information if needed.  Social History     Tobacco Use     Smoking status: Former Smoker     Types: Cigarettes     Quit date: 1960     Years since quittin.9     Smokeless tobacco: Never Used   Substance Use Topics     Alcohol use: No     Drug use: No       Family History   I have reviewed this patient's family history and updated it with pertinent information if needed.   Family History   Problem Relation Age of Onset     Hypertension Sister      Diabetes Paternal Grandmother      Diabetes Son      Deep Vein Thrombosis (DVT) No family hx of      Anesthesia Reaction No family hx of        Medications   I have reviewed this patient's current medications  Facility-Administered Medications Prior to Admission   Medication Dose Route Frequency Provider Last Rate Last Dose     lidocaine 1% with EPINEPHrine 1:100,000 injection 3 mL  3 mL Intradermal Once Chris Natarajan MD         Medications Prior to Admission   Medication Sig Dispense Refill Last Dose     acetaminophen (TYLENOL) 325 MG tablet Take 2 tablets (650 mg) by mouth At Bedtime (Patient taking differently: Take 650 mg by mouth as needed ) 100 tablet  Past Month at Unknown time     calcium carbonate 500 mg, elemental, (OSCAL 500) 1250 (500 Ca) MG TABS tablet Take by mouth every evening   Past Week at Unknown time     melatonin 3 MG tablet Take 1 tablet (3 mg) by mouth At Bedtime 90 tablet 3 Past Month at Unknown time     memantine XR (NAMENDA XR) 14 MG 24 hr capsule Take 1 capsule (14 mg) by mouth daily (Patient taking differently: Take 14 mg by mouth every evening ) 90 capsule 1 11/15/2020 at 0800     Multiple Vitamin (MULTIVITAMIN OR) Take 0.5 tablets by mouth every evening     Past Week at Unknown time       Allergies   Allergies   Allergen Reactions     Ciprofloxacin      Sulfa Drugs Nausea       Physical Exam   Vital Signs: Temp: 96  F (35.6  C) Temp src: Axillary BP: 115/44 Pulse: 71   Resp: 16 SpO2: 96 % O2 Device: None (Room air) Oxygen Delivery: 1 LPM  Weight: 99 lbs 3.31 oz    General:  Elderly female, appears stated age. Awake. Alert. NAD.  Currently on room air.  NG feeding tube to L nares.   HEENT:  No scleral icterus. Mucous membranes moist.   Cardiovascular:  RRR. S1, S2. Harsh III/VI systolic murmur. No JVD. Pedal pulses 2+ bilaterally.  Respiratory:  Normal effort. Lungs CTAB.   Gastrointestinal:  Abdomen soft, non-distended. Active bowel sounds. No tenderness or guarding. No mass or HSM.    Neurological:  Grossly non-focal. Moves all extremities.    Extremities:  No peripheral edema. No calf tenderness.   Skin:  Warm. Dry. No visible rash.     Data   Recent Labs   Lab 11/17/20 0625      POTASSIUM 4.5   CHLORIDE 108   CO2 25   ANIONGAP 5   *   BUN 25   CR 0.80   ANH 8.5   MAG 1.9   PHOS 3.2     Recent Labs   Lab 11/17/20  0625 11/16/20 2006   WBC 7.3  --    RBC 3.07*  --    HGB 9.9*  --    HCT 31.2*  --    *  --    MCH 32.2  --    MCHC 31.7  --    RDW 13.2  --     151          Glucose Values Latest Ref Rng & Units 11/5/2020 11/16/2020 11/16/2020 11/17/2020 11/17/2020   Bedside Glucose (mg/dl )  - -- -- -- -- --   GLUCOSE 70 - 99 mg/dL 81 62(L) 165(H) 158(H) 158(H)   Some recent data might be hidden        All labs personally reviewed in [facility].  See A&P for additional results.     Unresulted Labs Ordered in the Past 30 Days of this Admission     Date and Time Order Name Status Description    11/16/2020 1240 Surgical pathology exam Preliminary

## 2020-11-17 NOTE — CONSULTS
Care Management Initial Consult      General Information  Assessment completed with: Spouse or significant other;Other(Adelia, nurse with [employer]), (; Adelia, nurse with [employer].)  Type of CM/SW Visit: Offer D/C Planning  Primary Care Provider verified and updated as needed:     Readmission within the last 30 days:        Reason for Consult: discharge planning    Communication Assessment  Patient's communication style: spoken language (English or Bilingual)    Hearing Difficulty or Deaf: yes   Wear Glasses or Blind: no    Cognitive  Cognitive/Neuro/Behavioral: .WDL except  Level of Consciousness: confused;alert  Arousal Level: opens eyes spontaneously  Orientation: disoriented to;time(forgetful)  Mood/Behavior: calm;cooperative  Best Language: 0 - No aphasia  Speech: spontaneous;logical     Living Environment:   People in home: spouse    Current living Arrangements: apartment    Able to return to prior arrangements:  Yes     Family/Social Support:  Care provided by: homecare agency([facility])      Description of Support System:  Lives with . Private pay care thru [employer]. [facility] will provide 24 hour live-in help after pt is discharge.     Current Resources:   Skilled Home Care Services: None  Community Resources:   [employer]    Lifestyle & Psychosocial Needs:        Socioeconomic History     Marital status:      Spouse name: Not on file     Number of children: Not on file     Years of education: Not on file     Highest education level: Not on file     Tobacco Use     Smoking status: Former Smoker     Types: Cigarettes     Quit date: 1960     Years since quittin.9     Smokeless tobacco: Never Used   Substance and Sexual Activity     Alcohol use: No     Drug use: No     Sexual activity: Not Currently     Partners: Male       Additional Information:    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  Past history includes:  Dementia; chronic anemia; recently diagnosed aortic stenosis.     In 6A Discharge Rounds JUAN A Wilson, reported plan is to monitor oral intake. Pt can have clear liquids, advance to full liquids, but will not advance diet any further. Potential discharge later today or tomorrow pending oral intake. If oral intake is adequate will remove NG tube prior to discharge.    Chart reviewed. This afternoon spoke with pt's nurse, Marcy, she reported pt pt took about 500ml of liquid. Spoke with JUAN A Arriola and plan will be for pt to stay overnight and continue to monitor oral intake.   Called pt's  to discuss discharge planning. He was very hard of hearing, he was not able to hear me at all on the phone. While I was on the phone he went and got a female neighbor to help communicate. Informed them Mrs Alanis is doing well; the doctors want to watch her overnight, make sure she is taking enough liquids. Plan is discharge tomorrow. He was not able to tell me what services they have in the home. I mentioned Adelia is listed on the facesheet and he said she is a coordinator. I will call her.  expressed appreciation for the information & phone call.  Called Adelia High, listed as an Emergency Contact on the facesheet. Phone: [phone]. Adelia said she is a nurse with [employer]. They have been providing medication set up every 2 weeks and helping pt get to appointments. They were starting to provide assist with cooking and a HHA 2x/week. Adelia said the plan is for [employer] to provide 24 hour live-in assist after pt discharges home. Adelia said she will provide transport when pt is ready to go home. She can pick pt up tomorrow afternoon (not in the morning). Informed Adelia due to hospital visitor restrictions now in place the nursing staff will bring Anca to the front of the hospital when Adelia arrives. Gave her the phone number for  Unit [address]. Adelia requested the prescriptions be filled at the hospital discharge pharmacy.      Anticipated Discharge Date: 11/18/20    Discharge Disposition: Home  Discharge Services:  Resume private pay home care services.   Discharge Transportation:  Adelia, nurse with [employer]. Phone: [phone]    Education Provided on the Discharge Plan:  Yes  Persons Notified of Discharge Plans:  . Adelia, nurse from [facility].  Patient/Family in Agreement with the Plan: yes     Plan:   Anticipate discharge early afternoon on 11- if pt is taking adequate oral intake.  Adelia, nurse with [employer] will provide transport home.      Ghada Sue RN Care Coordinator  Unit [address], [facility]

## 2020-11-17 NOTE — UTILIZATION REVIEW
Admission Status; Secondary Review Determination       Under the authority of the Utilization Management Committee, the utilization review process indicated a secondary review on the above patient. The review outcome is based on review of the medical records, discussions with staff, and applying clinical experience noted on the date of the review.     (x) Inpatient Status Appropriate - This patient's medical care is consistent with medical management for inpatient care and reasonable inpatient medical practice.     RATIONALE FOR DETERMINATION   89 year old female with a past medical history of dementia and aortic stenosis, now POD#1 s/p WLE of right hard palate lesion and NG placement. This is a conditional review for a Medicare patient, at the time of this review patient is anticipated to require at least 1 more night of hospital care; however if plan changes and discharges before 2 midnights please send for post discharge review.    This document was produced using voice recognition software       The information on this document is developed by the utilization review team in order for the business office to ensure compliance. This only denotes the appropriateness of proper admission status and does not reflect the quality of care rendered.   The definitions of Inpatient Status and Observation Status used in making the determination above are those provided in the CMS Coverage Manual, Chapter 1 and Chapter 6, section 70.4.   Sincerely,   EDYTA SCOTT MD   System Medical Director   Utilization Management   [facility].

## 2020-11-17 NOTE — TELEPHONE ENCOUNTER
Health Call Center    Phone Message    May a detailed message be left on voicemail: yes     Reason for Call: Other: The Pts son called to get an update on when the Pt will be discharged as she is inpatient right now. I called the back line to see if there was a way for them to find out and I spoke with Christine. She stated that it would be best for the Pt to ask the inpatient nurse for that information. The Pts son stated he has no idea how to get in contact with the inpatient nurse as he is not in the hospital right now due to COVID. I tried finidng out if there was a number with no responses. Please call the Pts son back at [phone]. They are wanting to make sure that the Pt when she is discharged has home health care lined up. Thank you1     Action Taken: Message routed to:  [facility] ENT    Travel Screening: Not Applicable

## 2020-11-18 ENCOUNTER — PATIENT OUTREACH (OUTPATIENT)
Dept: CARE COORDINATION | Facility: CLINIC | Age: 85
End: 2020-11-18

## 2020-11-18 ENCOUNTER — APPOINTMENT (OUTPATIENT)
Dept: OCCUPATIONAL THERAPY | Facility: CLINIC | Age: 85
DRG: 138 | End: 2020-11-18
Attending: OTOLARYNGOLOGY
Payer: MEDICARE

## 2020-11-18 VITALS
TEMPERATURE: 96.9 F | HEIGHT: 63 IN | BODY MASS INDEX: 17.58 KG/M2 | WEIGHT: 99.21 LBS | HEART RATE: 59 BPM | OXYGEN SATURATION: 95 % | RESPIRATION RATE: 16 BRPM | DIASTOLIC BLOOD PRESSURE: 55 MMHG | SYSTOLIC BLOOD PRESSURE: 134 MMHG

## 2020-11-18 LAB
ANION GAP SERPL CALCULATED.3IONS-SCNC: 5 MMOL/L (ref 3–14)
BUN SERPL-MCNC: 24 MG/DL (ref 7–30)
CALCIUM SERPL-MCNC: 8.8 MG/DL (ref 8.5–10.1)
CHLORIDE SERPL-SCNC: 110 MMOL/L (ref 94–109)
CO2 SERPL-SCNC: 25 MMOL/L (ref 20–32)
COPATH REPORT: NORMAL
CREAT SERPL-MCNC: 0.78 MG/DL (ref 0.52–1.04)
ERYTHROCYTE [DISTWIDTH] IN BLOOD BY AUTOMATED COUNT: 13.3 % (ref 10–15)
GFR SERPL CREATININE-BSD FRML MDRD: 64 ML/MIN/{1.73_M2}
GLUCOSE SERPL-MCNC: 90 MG/DL (ref 70–99)
HCT VFR BLD AUTO: 30.8 % (ref 35–47)
HGB BLD-MCNC: 9.5 G/DL (ref 11.7–15.7)
MAGNESIUM SERPL-MCNC: 1.8 MG/DL (ref 1.6–2.3)
MCH RBC QN AUTO: 32 PG (ref 26.5–33)
MCHC RBC AUTO-ENTMCNC: 30.8 G/DL (ref 31.5–36.5)
MCV RBC AUTO: 104 FL (ref 78–100)
PHOSPHATE SERPL-MCNC: 2.9 MG/DL (ref 2.5–4.5)
PLATELET # BLD AUTO: 150 10E9/L (ref 150–450)
POTASSIUM SERPL-SCNC: 4.2 MMOL/L (ref 3.4–5.3)
RBC # BLD AUTO: 2.97 10E12/L (ref 3.8–5.2)
SODIUM SERPL-SCNC: 140 MMOL/L (ref 133–144)
WBC # BLD AUTO: 5 10E9/L (ref 4–11)

## 2020-11-18 PROCEDURE — 97535 SELF CARE MNGMENT TRAINING: CPT | Mod: GO | Performed by: OCCUPATIONAL THERAPIST

## 2020-11-18 PROCEDURE — 83735 ASSAY OF MAGNESIUM: CPT | Performed by: OTOLARYNGOLOGY

## 2020-11-18 PROCEDURE — 250N000013 HC RX MED GY IP 250 OP 250 PS 637: Performed by: STUDENT IN AN ORGANIZED HEALTH CARE EDUCATION/TRAINING PROGRAM

## 2020-11-18 PROCEDURE — 85027 COMPLETE CBC AUTOMATED: CPT | Performed by: OTOLARYNGOLOGY

## 2020-11-18 PROCEDURE — 250N000011 HC RX IP 250 OP 636: Performed by: STUDENT IN AN ORGANIZED HEALTH CARE EDUCATION/TRAINING PROGRAM

## 2020-11-18 PROCEDURE — 84100 ASSAY OF PHOSPHORUS: CPT | Performed by: OTOLARYNGOLOGY

## 2020-11-18 PROCEDURE — 36415 COLL VENOUS BLD VENIPUNCTURE: CPT | Performed by: OTOLARYNGOLOGY

## 2020-11-18 PROCEDURE — 80048 BASIC METABOLIC PNL TOTAL CA: CPT | Performed by: OTOLARYNGOLOGY

## 2020-11-18 RX ORDER — CHLORHEXIDINE GLUCONATE ORAL RINSE 1.2 MG/ML
15 SOLUTION DENTAL 4 TIMES DAILY
Qty: 473 ML | Refills: 3 | Status: SHIPPED | OUTPATIENT
Start: 2020-11-18 | End: 2020-11-27

## 2020-11-18 RX ADMIN — ACETAMINOPHEN 975 MG: 325 TABLET, FILM COATED ORAL at 04:00

## 2020-11-18 RX ADMIN — HEPARIN SODIUM 5000 UNITS: 10000 INJECTION, SOLUTION INTRAVENOUS; SUBCUTANEOUS at 00:27

## 2020-11-18 RX ADMIN — HEPARIN SODIUM 5000 UNITS: 10000 INJECTION, SOLUTION INTRAVENOUS; SUBCUTANEOUS at 09:25

## 2020-11-18 RX ADMIN — CHLORHEXIDINE GLUCONATE 0.12% ORAL RINSE 15 ML: 1.2 LIQUID ORAL at 12:53

## 2020-11-18 RX ADMIN — CHLORHEXIDINE GLUCONATE 0.12% ORAL RINSE 15 ML: 1.2 LIQUID ORAL at 09:25

## 2020-11-18 RX ADMIN — ACETAMINOPHEN 975 MG: 325 TABLET, FILM COATED ORAL at 12:53

## 2020-11-18 ASSESSMENT — ACTIVITIES OF DAILY LIVING (ADL)
ADLS_ACUITY_SCORE: 19

## 2020-11-18 NOTE — PROGRESS NOTES
Status: POD #2 s/p excision of a hard palate squamous cell carcinoma and Nasogastric tube placement  Vitals: VSS on RA.    Neuros: A&Ox3. D/o to date.  Forgetful.  Very [place] bilaterally.  4/5 strength throughout  IV:PIV removed  Resp/trach: WDL  Diet: full liquid diet. Good PO. Boost shakes encouraged.  Refer to flowsheet for more details.  NG removed this am.   Bowel status: BS+. BM this shift  : voiding spont, using bathroom toilet  Skin: R mouth incision w/sutures and bolster dressing.  R tongue bruising.  Bruising throughout  Pain: Pain managed well with scheduled tylenol  Activity: SBA, GB,  pivot to commode.    Plan: Has discharge orders ready.  Plan is for home health nurse, Adelia, to pick patient up at front door this afternoon.  Home health nurse will call, when at the front door to pick the patient up. Discharge orders are printed and in an envelope packet in patient's belonging bag. Patient's meds need to be picked up in the discharge pharmacy, they are ready, writer called the pharmacy to confirm.  Patient is dressed and ready to discharge anytime.      1455: Patient discharged.  Patient was picked up at the front door by Adelia RN.  Writer (Marcy) wheeled the patient in a WC to the main lobby, and helped the patient  her medications in the pharmacy.

## 2020-11-18 NOTE — PROGRESS NOTES
Patient ate good breakfast, 300mL, and continuing to sip on Boost shake and water at the bedside.  NG tube pulled.

## 2020-11-18 NOTE — PROGRESS NOTES
Care Management Discharge Note    Discharge Date: 11/18/20    Discharge Disposition: Home    Discharge Services:  Resume private pay home services thru [facility].   Skilled home care RN & HHA thru [facility]  Discharge DME: None    Discharge Transportation: other (see comments)(Adelia, nurse with [facility].)    Persons Notified of Discharge Plans:  Adelia, nurse from [facility].  informed yesterday.   Patient/Family in Agreement with the Plan: yes    Additional Information:  Informed by ENT this morning pt is ready for discharge; removing NG tube prior to discharge.   Called Adelia High nurse with [facility]. Informed her pt is ready for discharge today. Discussed pt would benefit from skilled home care RN to assess hydration, nutrition and medication management; HHA to assist with ADLs, bathing. Skilled home care info will be on the discharge orders. Adelia said to send the discharge orders with pt, no need to fax to the [facility] office. Adelia will call 6A when she is in front of the hospital to transport pt.  Skilled home care RN & HHA orders and Face to Face done. Packet of info, including AVS, recent ENT progress note, OR note and Medicine consult note included in the packet for [facility].  Informed Marcy, pt's nurse of plan. She will  pt's meds from Discharge Pharmacy. Marcy will give packet with discharge orders to Adelia with [facility].       Ghada Sue, RN Care Coordinator  Unit [address], [facility]      [facility], [facility]  Phone:  [phone]

## 2020-11-18 NOTE — PLAN OF CARE
Status: POD #1 s/p excision of a hard palate squamous cell carcinoma and Nasogastric tube placement  Vitals: VSS on RA  Neuros: Disoriented to time, place, situation. Forgetful. [place] bilaterally. 4/5 strengths throughout  IV: PIV SL  Resp/trach: LS clear  Diet: Full liquid diet. NG in place, clamped. Drank about 3/4 of her Boost shake tonight and a few bites of the magic cup  Bowel status: 1 large loose BM this shift  : Voiding spontaneously  Skin: R mouth incision w/ sutures and bolster dressing. R tongue bruising. Bruising throughout/ fragile skin  Pain: Managed with scheduled Tylenol  Activity: Up with 1 and GB, steady on feet with assistance  Plan: Possible discharge tomorrow. Continue to encourage PO intake. Continue to monitor and follow POC

## 2020-11-18 NOTE — PLAN OF CARE
Status: POD #2 s/p excision of a hard palate squamous cell carcinoma and Nasogastric tube placement  Vitals: VSS on RA  Neuros: AxOx2, d/o to time and place. Forgetful. [place] bilaterally, 4/5 throughout.   IV: PIV, SL  Resp/trach: LS diminished at bases.   Diet: Full liquid diet. NG in place and clamped-can pull when taking adequate PO  Bowel status: Had two BM's this shift, fecal incontinence at times  : Voiding spontaneously   Skin: R mouth incision w/ sutures and bolster dressing. R. Tongue with brusing. Bruising present t/o.   Pain: No c/o pain overnight, taking scheduled tylenol   Activity: A1/GB  Plan: Possible discharge today. Continue to monitor and follow POC.

## 2020-11-18 NOTE — DISCHARGE SUMMARY
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  CT scans demonstrated questionable bony erosion, however, the area of bony dehiscence in the maxillary sinus was symmetric and felt to be anatomic variant.  The risks, benefits and alternatives to the above interventions were discussed with the patient and she wished to proceed.     Hospital Course: The patient was admitted to the hospital and underwent the above mentioned procedure. She tolerated the procedure without any intra- or martha-operative complications. Please see the operative report for full details of the procedure. The patient was admitted for post-operative monitoring. Her postoperative course was uneventful. Due to the location of her surgery on the hard palate, an NG was inserted intraoperatively as a precaution in case her PO intake was insufficient. POD1 the patient had minimal pain, vitals were stable, and she was tolerating a clear liquid diet. Medicine was consulted due to her history of aortic stenosis. PT cleared her for home. POD2 she was tolerating a full liquid diet, and was obtaining sufficient calories. At discharge, the patient's pain was well controlled, the patient was voiding on her own, and she was ambulating and tolerating a regular diet.     Discharge Exam:  Vitals:    11/17/20 0315 11/17/20 0847 11/17/20 1617 11/18/20 0035   BP: 118/54 115/44 125/51 124/54   BP Location: Right arm Right arm Right arm Right arm   Pulse: 63 71 67 61   Resp: 16 16 16 16    Temp: 95.9  F (35.5  C) 96  F (35.6  C) 97.4  F (36.3  C) 96.6  F (35.9  C)   TempSrc: Axillary Axillary Oral Oral   SpO2: 97% 96% 96% 97%   Weight:       Height:             General: Alert and oriented x 3, No acute distress               HEENT: EOMI. HB 1/6. Xeroform bolster in place. No oral bleeding. NG sutured to the septum.                Pulmonary: Breathing non-labored, no stridor, no accessory muscle use.   Anca Alanis   Home Medication Instructions [id number]    Printed on:11/18/20 0353   Medication Information                      acetaminophen (TYLENOL) 325 MG tablet  Take 2 tablets (650 mg) by mouth At Bedtime             calcium carbonate 500 mg, elemental, (OSCAL 500) 1250 (500 Ca) MG TABS tablet  Take by mouth every evening             chlorhexidine (PERIDEX) 0.12 % solution  Swish and spit 15 mLs in mouth 4 times daily for 14 days             melatonin 3 MG tablet  Take 1 tablet (3 mg) by mouth At Bedtime             memantine XR (NAMENDA XR) 14 MG 24 hr capsule  Take 1 capsule (14 mg) by mouth daily             Multiple Vitamin (MULTIVITAMIN OR)  Take 0.5 tablets by mouth every evening              oxyCODONE (ROXICODONE) 5 MG tablet  Take 0.5 tablets (2.5 mg) by mouth every 4 hours as needed for moderate to severe pain                   Discharge Procedure Orders   Home care nursing referral   Referral Priority: Routine Referral Type: Home Health Therapies & Aides   Number of Visits Requested: 1     Reason for your hospital stay   Order Comments: Surgery     Adult [facility] Follow-up and recommended labs and tests   Order Comments: Follow up in ENT clinic with Dr. Villalobos as previously scheduled. Please call the clinic with questions/concerns: [phone].    Otolaryngology/ENT Clinic:  [facility]  [facility]  [address] [place] [address]     Activity   Order Comments: No heavy lifting greater than 10 lbs and no strenuous  "exercise for 2 weeks or until follow up appointment. No driving while taking narcotic pain medications.     Order Specific Question Answer Comments   Is discharge order? Yes      When to contact your care team   Order Comments: Please notify your doctor if you experience wound breakdown, sustained bleeding from the wound site, or increasing redness, swelling, and/or purulent malorodorous discharge from the wound site which may indicate infection. If you feel it is acute, or experience sudden changes in breathing, chest pain, or excessive sleepiness/somnolence please return to the emergency department or call 911. If you have questions or concerns during the day please call ENT clinic and [phone]. If at night you can call [facility] at [phone] and ask for the \"ENT resident on call\".     Wound care and dressings   Order Comments: Instructions to care for your wound at home: Leave oral dressing in place.     MD face to face encounter   Order Comments: Documentation of Face to Face and Certification for Home Health Services    I certify that patient: Anca Alanis is under my care and that I, or a nurse practitioner or physician's assistant working with me, had a face-to-face encounter that meets the physician face-to-face encounter requirements with this patient on: November 18, 2020.    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 Procedure: Wide local excision of a hard palate squamous cell carcinoma.     I certify that, based on my findings, the following services are medically necessary home health services: Nursing.    Disoriented to time, place, situation. Forgetful. [place] bilaterally. 4/5 strengths throughout    My clinical findings support the need for the above services because: Nurse is needed: To assess after changes in medications or other " medical regimen; to provide assessment and oversight required in the home to assure adherence to the medical plan due to: pt with Alzheimer's is forgetful & disoriented; nurse to assess incision & bolster, medication management; pt on full liquids post-op, assess for hydration & nutritional status; to provide caregiver training to assist with: medication management, promote hydration & nutrition.     Further, I certify that my clinical findings support that this patient is homebound (i.e. absences from home require considerable and taxing effort and are for medical reasons or [employer] services or infrequently or of short duration when for other reasons) because: Requires assistance of another person or specialized equipment to access medical services, is unable to exit home safely on own due to: Alzheimer's disease, forgetful, disoriented; requires supervision of another for safe transfer due to Alzheimer's disease.     Based on the above findings. I certify that this patient is confined to the home and needs intermittent skilled nursing care, physical therapy and/or speech therapy.  The patient is under my care, and I have initiated the establishment of the plan of care.  This patient will be followed by a physician who will periodically review the plan of care.  Physician/Provider to provide follow up care: Rafat Richards    Attending [facility] physician (the Medicare certified [place] provider): Silverio Villalobos MD  Physician Signature: See electronic signature associated with these discharge orders.  Date: 11/18/2020     Diet   Order Comments: Follow this diet upon discharge: Orders Placed This Encounter      Snacks/Supplements Adult: Other; Boost; With Meals      Snacks/Supplements Adult: Magic Cup; Between Meals      Advance Diet as Tolerated: Full Liquid Diet; Full Liquid Diet     Order Specific Question Answer Comments   Is discharge order? Yes        Dispo: To home in good condition. All of the patient's  questions/concerns have been addressed at this time.     Pamela Moctezuma PGY-2  Otolaryngology-Head & Neck Surgery  Please contact ENT by dialing [phone] and entering job code [phone].

## 2020-11-18 NOTE — PROGRESS NOTES
CLINICAL NUTRITION SERVICES - BRIEF NOTE    Provider Order received for Registered Dietitian to Assess and Order TF per Medical Nutrition Therapy Protocol.  Per chart review and discussion with nursing staff and charge nurse throughout the day, informed that pt has wanted to try eating and hold off on starting TF for now.   Reported that pt had good po intakes on a [place] diet on days and this evening, pt is currently in the process of drinking a Boost Shake per RN. Informed that pt may discharge in the morning.     Due to pt's preference to continue to take po, no plan for entering TF orders at this time.     Please reconsult if TF therapy warranted tomorrow.    Bri Ivey RD,LD  6A pager [phone]

## 2020-11-18 NOTE — PROGRESS NOTES
"Otolaryngology Progress Note  11/18/20     S: No acute events overnight. Reports she is doing well. She had boost last night as well as part of a magic cup.     O: /54 (BP Location: Right arm)   Pulse 61   Temp 96.6  F (35.9  C) (Oral)   Resp 16   Ht 1.6 m (5' 3\")   Wt 45 kg (99 lb 3.3 oz)   LMP  (LMP Unknown)   SpO2 97%   BMI 17.57 kg/m     General: Alert and oriented x 3, No acute distress   HEENT: EOMI. HB 1/6. Xeroform bolster in place. No oral bleeding. NG sutured to the septum.    Pulmonary: Breathing non-labored, no stridor, no accessory muscle use.    LABS:  ROUTINE IP LABS (Last four results)  BMP  Recent Labs   Lab 11/18/20 0610 11/17/20 0625    138   POTASSIUM 4.2 4.5   CHLORIDE 110* 108   ANH 8.8 8.5   CO2 25 25   BUN 24 25   CR 0.78 0.80   GLC 90 158*     CBC  Recent Labs   Lab 11/18/20 0610 11/17/20 0625 11/16/20 2006   WBC 5.0 7.3  --    RBC 2.97* 3.07*  --    HGB 9.5* 9.9*  --    HCT 30.8* 31.2*  --    * 102*  --    MCH 32.0 32.2  --    MCHC 30.8* 31.7  --    RDW 13.3 13.2  --     160 151     INRNo lab results found in last 7 days.      A/P: Anca Alanis is a 89 year old female with a past medical history of dementia and aortic stenosis, now POD#2 s/p WLE of right hard palate lesion and NG placement.     Neuro:  - Pain control: tylenol/oxycodone/dilaudid  - PTA memantine     HEENT:  - Xeroform bolster in place, to be removed in clinic  - Peridex QID     Respiratory:  - supplemental O2 PRN to keep sats >92%     CV/heme:  - HDS  - Hx of aortic stenosis: medicine consult, cardiac telemetry overnight     FEN/GI:  - FLD. Boost supplements ordered.  - Nutrition consult  - can pull NG when taking adequate PO, if unable to take adequate PO may need enteral feedings.   - Bowel regimen: senna     :  - straight cath per protocol     Endo  - no active issues     ID:  - Monitor for signs/symptoms of infection      Consults:  - PT/OT  - Nutrition  - " Medicine     PPx:  - Heparin  - SCDs  - IS     Dispo: Discharge home when tolerating PO, anticipate today    -- Patient and above plan to be discussed with Dr. Griselda Stafford MD  Otolaryngology-Head & Neck Surgery PGY3  Please contact ENT with questions by dialing * [phone] and entering job code [phone] when prompted.

## 2020-11-19 NOTE — PROGRESS NOTES
[facility]: Post-Discharge Note  SITUATION                                                      Admission:    Admission Date: 11/16/20   Reason for Admission: Cancer of hard palate  Discharge:   Discharge Date: 11/18/20  Discharge Diagnosis: Cancer of hard palate    BACKGROUND                                                      HPI: Anca Alanis is a 89 year old female with a past medical history of dementia and aortic stenosis who noted a lesion on her hard palate.  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  CT scans demonstrated questionable bony erosion, however, the area of bony dehiscence in the maxillary sinus was symmetric and felt to be anatomic variant.  The risks, benefits and alternatives to the above interventions were discussed with the patient and she wished to proceed.     ASSESSMENT      Discharge Assessment  Patient reports symptoms are: Improved  Does the patient have all of their medications?: Yes  Does patient know what their new medications are for?: Yes  Does patient have a follow-up appointment scheduled?: Yes  Does patient have any other questions or concerns?: No    Post-op  Did the patient have surgery or a procedure: Yes  Incision: healing  Drainage: No  Bleeding: none  Fever: No  Chills: No  Redness: No  Warmth: No  Swelling: No  Incision site pain: No  Eating & Drinking: eating and drinking without complaints/concerns  PO Intake: soft foods  Bowel Function: normal  Urinary Status: voiding without complaint/concerns        PLAN                                                      Outpatient Plan:  Follow up in ENT clinic with Dr. Villalobos as previously scheduled. Please call the clinic with questions/concerns: [phone].     Otolaryngology/ENT Clinic:  [facility]  Cl[facility]  [address] [place] [address]    Future Appointments   Date Time Provider Department Center    11/25/2020  1:45 PM Silverio Villalobos MD [facility]           Joy Jacobson, CMA

## 2020-11-20 ENCOUNTER — TUMOR CONFERENCE (OUTPATIENT)
Dept: ONCOLOGY | Facility: CLINIC | Age: 85
End: 2020-11-20
Payer: MEDICARE

## 2020-11-20 NOTE — PROGRESS NOTES
Head & Neck Tumor Conference Note        Status: Established  Staff: Dr. iVllalobos     Tumor Site: Right hard palate  Tumor Pathology: SCC  Tumor Stage: cT2 vs T4a NxMx  Tumor Treatment: None     Reason for Review: Review imaging, path, and POC     Brief History: This is a 89 year old female who was recently noted to have a lesion in the oral cavity with focus of early invasive squamous cell carcinoma found by her dentist. CT was obtained which demonstrated possible bony erosion. MRI was obtained, which showed symmetric dehiscence in the palate more consistent with anatomic variant than tumor invasion. She underwent a mucosal excision of the hard palate SCC on 20. Here to review pathology.     Pertinent PMH:   Past Medical History:   Diagnosis Date     Aortic stenosis      Cataracts, both eyes      Dementia (H)      Glaucoma suspect         Smoking Hx:   Social History     Tobacco Use     Smoking status: Former Smoker     Types: Cigarettes     Quit date: 1960     Years since quittin.9     Smokeless tobacco: Never Used   Substance Use Topics     Alcohol use: No     Drug use: No       Pathology:   FINAL DIAGNOSIS (20):   A. Posterior margin:   - Benign squamous mucosa, negative for dysplasia     B. Lateral margin:   - Benign squamous mucosa, negative for dysplasia     C. Medial margin:   - Benign squamous mucosa, negative for dysplasia     D. Anterior margin:   - Benign skin with mucosa, negative for dysplasia     E. Tooth:   - Tooth fragments as described grossly     F. Wide local excision of right palate:   - Invasive well differentiated squamous cell carcinoma   - Tumor size: 4.3 cm   - The depth invasion: Approximately 0.5 cm   - Perineural invasion: Not identified   - Angiolymphatic invasion: Not identified   - Peripheral resection margins free of tumor   - Deep resection margin cannot be determined (see comment)     COMMENT:   The tumor is present at the cauterized and inked deep surface.   However,   this area is associated with the   defect due to tooth and therefore, the deep resection cannot be assessed   with certainty.  Clinical correlation   is recommended.     Tumor Board Recommendation:   Discussion: Reviewed pathology, invasive SCC that is 4.3 cm in size, DOI 0.5 cm, PNI/LVI negative, deep resection margin cannot be determined. Intraoperatively tumor was noted to be approximating the bone of the maxilla. This area of bone was drilled down. Have had discussions with family regarding management, will forgo maxillectomy and adjuvant therapy given her age and co-morbidities. Tumor board recommending ongoing clinical surveillance.    Plan:   - continue clinical surveillance    Leo Stafford MD PGY-3  Otolaryngology- Head and Neck Surgery    Documentation / Disclaimer Cancer Tumor Board Note  Cancer tumor board recommendations do not override what is determined to be reasonable care and treatment, which is dependent on the circumstances of a patient's case; the patient's medical, social, and personal concerns; and the clinical judgment of the oncologist [physician].

## 2020-11-25 ENCOUNTER — OFFICE VISIT (OUTPATIENT)
Dept: OTOLARYNGOLOGY | Facility: CLINIC | Age: 85
End: 2020-11-25
Payer: MEDICARE

## 2020-11-25 VITALS
HEIGHT: 62 IN | HEART RATE: 72 BPM | OXYGEN SATURATION: 98 % | TEMPERATURE: 97.9 F | WEIGHT: 96 LBS | BODY MASS INDEX: 17.66 KG/M2

## 2020-11-25 DIAGNOSIS — I73.9 PVD (PERIPHERAL VASCULAR DISEASE) (H): ICD-10-CM

## 2020-11-25 DIAGNOSIS — E44.0 MODERATE PROTEIN-CALORIE MALNUTRITION (H): ICD-10-CM

## 2020-11-25 DIAGNOSIS — C05.0 CANCER OF HARD PALATE (H): Primary | ICD-10-CM

## 2020-11-25 DIAGNOSIS — C06.9 SQUAMOUS CELL CARCINOMA OF ORAL CAVITY (H): ICD-10-CM

## 2020-11-25 DIAGNOSIS — R53.2 COMPLETE IMMOBILITY DUE TO SEVERE PHYSICAL DISABILITY OR FRAILTY (H): ICD-10-CM

## 2020-11-25 PROBLEM — E46 PROTEIN-CALORIE MALNUTRITION (H): Status: ACTIVE | Noted: 2020-11-25

## 2020-11-25 PROCEDURE — 99024 POSTOP FOLLOW-UP VISIT: CPT | Performed by: OTOLARYNGOLOGY

## 2020-11-25 ASSESSMENT — MIFFLIN-ST. JEOR: SCORE: 778.7

## 2020-11-25 NOTE — PATIENT INSTRUCTIONS
1. Please follow-up in clinic in 2 months.   2. Please call the ENT clinic with any questions,concerns, new or worsening symptoms.    -Clinic number is [phone]   - [facility]'s direct line (Dr. Villalobos's nurse) [phone]

## 2020-11-25 NOTE — PROGRESS NOTES
HISTORY OF PRESENT ILLNESS:  Ms. Alanis returns.  She is a 89-year-old lady who is nine days out from a wide local excision of the right hard palate for squamous cell carcinoma.  Given her advanced age, we had significant discussions about moving forward with this surgery.  We eventually decided after a family conference that we would move forward with it.  She did well after surgery and had a feeding tube placed at the time, but had it removed before leaving the hospital.  Her pathology revealed close, but negative margins and we reviewed her pathology at Tumor Board and do not feel that she requires any additional therapy.      PHYSICAL EXAMINATION:  On examination today, the bolster is in place.  I removed the bolster, which shows some healing bone underneath.  There is not much granulation tissue yet, but it will likely granulate soon.  There is no evidence of any bleeding and the appearance is typical.      IMPRESSION:  Doing well.      PLAN:   1.  I will see her back in two months for oncologic surveillance.   2.  We will give her Periodex to rinse her oral cavity with the meantime.

## 2020-11-25 NOTE — NURSING NOTE
"Chief Complaint   Patient presents with     RECHECK     post op      Pulse 72, temperature 97.9  F (36.6  C), height 1.575 m (5' 2\"), weight 43.5 kg (96 lb), SpO2 98 %, not currently breastfeeding.    Willie Tang LPN    "

## 2020-11-25 NOTE — LETTER
11/25/2020       RE: Anca Alanis  [address]  [place] [address]     Dear Colleague,    Thank you for referring your patient, Anca Alanis, to the [facility] [place] at [facility]. Please see a copy of my visit note below.    HISTORY OF PRESENT ILLNESS:  Ms. Alanis returns.  She is a 89-year-old lady who is nine days out from a wide local excision of the right hard palate for squamous cell carcinoma.  Given her advanced age, we had significant discussions about moving forward with this surgery.  We eventually decided after a family conference that we would move forward with it.  She did well after surgery and had a feeding tube placed at the time, but had it removed before leaving the hospital.  Her pathology revealed close, but negative margins and we reviewed her pathology at Tumor Board and do not feel that she requires any additional therapy.      PHYSICAL EXAMINATION:  On examination today, the bolster is in place.  I removed the bolster, which shows some healing bone underneath.  There is not much granulation tissue yet, but it will likely granulate soon.  There is no evidence of any bleeding and the appearance is typical.      IMPRESSION:  Doing well.      PLAN:   1.  I will see her back in two months for oncologic surveillance.   2.  We will give her Periodex to rinse her oral cavity with the meantime.     Again, thank you for allowing me to participate in the care of your patient.      Sincerely,    Silverio Villalobos MD

## 2020-11-27 ENCOUNTER — TELEPHONE (OUTPATIENT)
Dept: OTOLARYNGOLOGY | Facility: CLINIC | Age: 85
End: 2020-11-27

## 2020-11-27 DIAGNOSIS — C05.0 CANCER OF HARD PALATE (H): ICD-10-CM

## 2020-11-27 RX ORDER — CHLORHEXIDINE GLUCONATE ORAL RINSE 1.2 MG/ML
15 SOLUTION DENTAL 4 TIMES DAILY
Qty: 473 ML | Refills: 3 | Status: SHIPPED | OUTPATIENT
Start: 2020-11-27 | End: 2021-01-27

## 2020-11-27 NOTE — TELEPHONE ENCOUNTER
chlorhexidine (PERIDEX) 0.12 % solution  Last Written Prescription Date:  11/18/20  Last Fill Quantity: 473,   # refills: 3  [facility] - [place], [place] - [address]  Last Office Visit : 11/25/20  Future Office visit:  1/27/2021         Remaining refills ent to :  [id number] - [place], [place] - [address]  Notified N

## 2020-11-27 NOTE — TELEPHONE ENCOUNTER
[facility] Call Center    Phone Message    May a detailed message be left on voicemail: yes     Reason for Call: Medication Refill Request    Has the patient contacted the pharmacy for the refill? Yes   Name of medication being requested: chlorhexidine (PERIDEX) 0.12 % solution  Provider who prescribed the medication: Dr Leo Stafford - department unknown  Pharmacy:  [id number] - [place], [place] - [address] [place] [address]    Date medication is needed: ASAP - Pt does not have enough to last through the weekend, per personal RN, Adelia         Action Taken: Message routed to:  [facility] ([facility]): ENT    Travel Screening: Not Applicable

## 2020-11-30 ENCOUNTER — DOCUMENTATION ONLY (OUTPATIENT)
Dept: FAMILY MEDICINE | Facility: CLINIC | Age: 85
End: 2020-11-30

## 2020-11-30 NOTE — PROGRESS NOTES
"When opening a documentation only encounter, be sure to enter in \"Chief Complaint\" Forms and in \" Comments\" Title of form, description if needed.    Anca is a 89 year old  female  Form received via: Fax  Form now resides in: Provider Ready    Sharonda Mari MA     Form has been completed by provider.     Form sent out via: Fax to [employer] at Fax Number: [phone]  Patient informed: No, Reason:n/a  Output date: December 2, 2020    Sharonda Mari MA      **Please close the encounter**                      "

## 2020-12-17 ENCOUNTER — TELEPHONE (OUTPATIENT)
Dept: FAMILY MEDICINE | Facility: CLINIC | Age: 85
End: 2020-12-17

## 2020-12-17 NOTE — TELEPHONE ENCOUNTER
"Request for medication refill:    Alendronate Sodium Oral Tablet 70mg, Aricept Oral Tablet 5 mg    Providers if patient needs an appointment and you are willing to give a one month supply please refill for one month and  send a letter/MyChart using \".SMILLIMITEDREFILL\" .smillimited and route chart to \"P SMI \" (Giving one month refill in non controlled medications is strongly recommended before denial)    If refill has been denied, meaning absolutely no refills without visit, please complete the smart phrase \".smirxrefuse\" and route it to the \"P SMI MED REFILLS\"  pool to inform the patient and the pharmacy.    Patricia Sanchez, EMT        "

## 2020-12-17 NOTE — TELEPHONE ENCOUNTER
Medication Refill Denied    Reason: NO visit needed.  These two medications have been discontinued.    Rafat Richards MD

## 2020-12-18 NOTE — ANESTHESIA POSTPROCEDURE EVALUATION
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     Anesthesia Type: No value filed.       Disposition: Outpatient   Postop Pain Control: Uneventful            Sign Out: Well controlled pain   PONV: No   Neuro/Psych: Uneventful            Sign Out: Acceptable/Baseline neuro status   Airway/Respiratory: Uneventful            Sign Out: Acceptable/Baseline resp. status   CV/Hemodynamics: Uneventful            Sign Out: Acceptable CV status   Other NRE: NONE   DID A NON-ROUTINE EVENT OCCUR? No         Last Anesthesia Record Vitals:  CRNA VITALS  2020 1306 - 2020 1406      2020             Pulse:  64    ART BP:  196/48    ART Mean:  103    SpO2:  99 %          Last PACU Vitals:  Vitals Value Taken Time   /59 20 1900   Temp 37.2  C (99  F) 20 1900   Pulse 62 20 1900   Resp 16 20 1900   SpO2 97 % 20 1900   Temp src     NIBP     Pulse     SpO2     Resp     Temp     Ht Rate     Temp 2           Electronically Signed By: Sergo Rivero MD, 2020, 9:59 PM

## 2020-12-28 DIAGNOSIS — C05.0 CANCER OF HARD PALATE (H): ICD-10-CM

## 2021-01-04 RX ORDER — CHLORHEXIDINE GLUCONATE ORAL RINSE 1.2 MG/ML
15 SOLUTION DENTAL 4 TIMES DAILY
Qty: 473 ML | Refills: 0 | OUTPATIENT
Start: 2021-01-04

## 2021-01-27 ENCOUNTER — OFFICE VISIT (OUTPATIENT)
Dept: OTOLARYNGOLOGY | Facility: CLINIC | Age: 86
End: 2021-01-27
Payer: MEDICARE

## 2021-01-27 VITALS
DIASTOLIC BLOOD PRESSURE: 68 MMHG | HEART RATE: 78 BPM | SYSTOLIC BLOOD PRESSURE: 140 MMHG | BODY MASS INDEX: 17.44 KG/M2 | RESPIRATION RATE: 15 BRPM | TEMPERATURE: 98.1 F | WEIGHT: 94.8 LBS | HEIGHT: 62 IN

## 2021-01-27 DIAGNOSIS — K13.79 LESION OF HARD PALATE: ICD-10-CM

## 2021-01-27 DIAGNOSIS — C05.0 CANCER OF HARD PALATE (H): Primary | ICD-10-CM

## 2021-01-27 PROCEDURE — 88305 TISSUE EXAM BY PATHOLOGIST: CPT | Performed by: PATHOLOGY

## 2021-01-27 PROCEDURE — 42100 BIOPSY ROOF OF MOUTH: CPT | Mod: GC | Performed by: OTOLARYNGOLOGY

## 2021-01-27 PROCEDURE — 99213 OFFICE O/P EST LOW 20 MIN: CPT | Mod: 25 | Performed by: OTOLARYNGOLOGY

## 2021-01-27 ASSESSMENT — PAIN SCALES - GENERAL: PAINLEVEL: NO PAIN (0)

## 2021-01-27 ASSESSMENT — MIFFLIN-ST. JEOR: SCORE: 773.25

## 2021-01-27 NOTE — LETTER
1/27/2021       RE: Anca Alanis  [address] [place] [address]     Dear Colleague,    Thank you for referring your patient, Anca Alanis, to the [facility] EAR NOSE AND THROAT CLINIC [place] at [facility]. Please see a copy of my visit note below.    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 Her final pathology revealed close but negative margins and she was not recommended to undergo any additional therapy after review of her pathology at [facility]. She was last seen in clinic on 11/25/2020, at which time her bolster was removed uncovering healing bone and a small amount of granulation tissue. She has done well in the interim. She has no discomfort at the surgical site, although it feels rough to her when she runs her tongue over the area. Her weight is stable. She preferentially chews on the left side of her mouth, but this is not secondary to pain. She has not needed to alter her diet. There has been no bleeding or drainage from the area. Her accompanying family members do report seeing a new whitish area on the roof of her mouth about a week or two ago.    PHYSICAL EXAMINATION: Elderly female in no acute distress. Oral cavity examination remarkable for slowly healing right hard palate surgical site. There are several areas of exposed bone present, but appearance is improved from prior. Significant tenderness to palpation at fractured residual right upper canine. Granulation tissue is present as well as a small area of whitish hyperkeratotic mucosa centrally. No other intraoral lesions are seen. There is no palpable cervical lymphadenopathy.    PROCEDURE: Intraoral biopsy of the right hard palate was performed in clinic. Verbal consent was obtained from the patient. The area was locally anesthetized. Several  areas of irregular tissue on the right hard palate were taken for biopsy. Silver nitrate was used to achieve hemostasis. The patient tolerated this well without difficulty.    IMPRESSION: Slowly healing right hard palate surgical site. No evidence of disease recurrence, but biopsies taken as a precaution.    PLAN:  1. Right hard palate biopsy sent for permanent pathology.  2. Follow-up in clinic in 2 months for routine oncologic surveillance.    Charu Gonzalez MD PGY-3  Otolaryngology - Head & Neck Surgery      Again, thank you for allowing me to participate in the care of your patient.      Sincerely,    Silverio Villalobos MD

## 2021-01-27 NOTE — NURSING NOTE
"Chief Complaint   Patient presents with     RECHECK     follow up      Blood pressure (!) 140/68, pulse 78, temperature 98.1  F (36.7  C), resp. rate 15, height 1.575 m (5' 2\"), weight 43 kg (94 lb 12.8 oz), not currently breastfeeding.    Willie Tang LPN    "

## 2021-01-29 LAB — COPATH REPORT: NORMAL

## 2021-01-30 NOTE — PROGRESS NOTES
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 Her final pathology revealed close but negative margins and she was not recommended to undergo any additional therapy after review of her pathology at [facility]. She was last seen in clinic on 11/25/2020, at which time her bolster was removed uncovering healing bone and a small amount of granulation tissue. She has done well in the interim. She has no discomfort at the surgical site, although it feels rough to her when she runs her tongue over the area. Her weight is stable. She preferentially chews on the left side of her mouth, but this is not secondary to pain. She has not needed to alter her diet. There has been no bleeding or drainage from the area. Her accompanying family members do report seeing a new whitish area on the roof of her mouth about a week or two ago.    PHYSICAL EXAMINATION: Elderly female in no acute distress. Oral cavity examination remarkable for slowly healing right hard palate surgical site. There are several areas of exposed bone present, but appearance is improved from prior. Significant tenderness to palpation at fractured residual right upper canine. Granulation tissue is present as well as a small area of whitish hyperkeratotic mucosa centrally. No other intraoral lesions are seen. There is no palpable cervical lymphadenopathy.    PROCEDURE: Intraoral biopsy of the right hard palate was performed in clinic. Verbal consent was obtained from the patient. The area was locally anesthetized. Several areas of irregular tissue on the right hard palate were taken for biopsy. Silver nitrate was used to achieve hemostasis. The patient tolerated this well without difficulty.    IMPRESSION: Slowly healing right hard palate surgical site. No evidence of disease recurrence, but biopsies taken as a precaution.    PLAN:  1. Right hard  palate biopsy sent for permanent pathology.  2. Follow-up in clinic in 2 months for routine oncologic surveillance.    Charu Gonzalez MD PGY-3  Otolaryngology - Head & Neck Surgery    Silverio RAMOS MD, saw this patient with the resident/fellow and agree with the resident's findings and plan of care as documented in the resident's/fellow's note.    Silverio RAMOS MD, saw this patient with the resident/fellow and agree with the resident s findings and plan of care as documented in the resident s/fellow s note. I was present for the entire procedure.

## 2021-02-04 ENCOUNTER — PATIENT OUTREACH (OUTPATIENT)
Dept: OTOLARYNGOLOGY | Facility: CLINIC | Age: 86
End: 2021-02-04

## 2021-02-04 NOTE — PROGRESS NOTES
Called and spoke with daughter in law with the following results:    SPECIMEN(S):   Right palate biopsy     FINAL DIAGNOSIS:   Right palate biopsy:   - Benign superficial squamous mucosa with parakeratosis and bacterial   colonies   - Focal squamous atypia, consistent with reactive change     Also called and left message for patient's Nurse Adelia per request of daughter in law. Patient will continue to follow-up with Dr. Villalobos as scheduled in April. Left direct line for return call with any further questions or concerns.     Ambar Aguilar, RN, BSN

## 2021-02-12 ENCOUNTER — DOCUMENTATION ONLY (OUTPATIENT)
Dept: FAMILY MEDICINE | Facility: CLINIC | Age: 86
End: 2021-02-12

## 2021-02-12 NOTE — PROGRESS NOTES
"When opening a documentation only encounter, be sure to enter in \"Chief Complaint\" Forms and in \" Comments\" Title of form, description if needed.    Anca is a 89 year old  female  Form received via: Fax  Form now resides in: Provider Ready    Manju Aleman MA                  "

## 2021-02-18 NOTE — PROGRESS NOTES
Form has been completed by provider.     Form sent out via: Fax to [employer] at Fax Number: [phone]  Patient informed: No  Output date: February 18, 2021    Manju Aleman MA      **Please close the encounter**

## 2021-02-23 ENCOUNTER — IMMUNIZATION (OUTPATIENT)
Dept: FAMILY MEDICINE | Facility: CLINIC | Age: 86
End: 2021-02-23
Payer: MEDICARE

## 2021-02-23 PROCEDURE — 91300 PR COVID VAC PFIZER DIL RECON 30 MCG/0.3 ML IM: CPT

## 2021-02-23 PROCEDURE — 0001A PR COVID VAC PFIZER DIL RECON 30 MCG/0.3 ML IM: CPT

## 2021-03-16 ENCOUNTER — IMMUNIZATION (OUTPATIENT)
Dept: FAMILY MEDICINE | Facility: CLINIC | Age: 86
End: 2021-03-16
Attending: FAMILY MEDICINE
Payer: MEDICARE

## 2021-03-16 PROCEDURE — 0002A PR COVID VAC PFIZER DIL RECON 30 MCG/0.3 ML IM: CPT

## 2021-03-16 PROCEDURE — 91300 PR COVID VAC PFIZER DIL RECON 30 MCG/0.3 ML IM: CPT

## 2021-04-14 ENCOUNTER — OFFICE VISIT (OUTPATIENT)
Dept: OTOLARYNGOLOGY | Facility: CLINIC | Age: 86
End: 2021-04-14
Payer: MEDICARE

## 2021-04-14 VITALS
DIASTOLIC BLOOD PRESSURE: 80 MMHG | HEART RATE: 71 BPM | SYSTOLIC BLOOD PRESSURE: 136 MMHG | WEIGHT: 97 LBS | OXYGEN SATURATION: 99 % | BODY MASS INDEX: 17.85 KG/M2 | RESPIRATION RATE: 15 BRPM | HEIGHT: 62 IN | TEMPERATURE: 97.8 F

## 2021-04-14 DIAGNOSIS — E44.0 MODERATE PROTEIN-CALORIE MALNUTRITION (H): ICD-10-CM

## 2021-04-14 DIAGNOSIS — I73.9 PVD (PERIPHERAL VASCULAR DISEASE) (H): ICD-10-CM

## 2021-04-14 DIAGNOSIS — F03.90 DEMENTIA WITHOUT BEHAVIORAL DISTURBANCE, UNSPECIFIED DEMENTIA TYPE: ICD-10-CM

## 2021-04-14 DIAGNOSIS — C05.0 CANCER OF HARD PALATE (H): ICD-10-CM

## 2021-04-14 DIAGNOSIS — R53.2 COMPLETE IMMOBILITY DUE TO SEVERE PHYSICAL DISABILITY OR FRAILTY (H): Primary | ICD-10-CM

## 2021-04-14 PROCEDURE — 88312 SPECIAL STAINS GROUP 1: CPT | Performed by: PATHOLOGY

## 2021-04-14 PROCEDURE — 88305 TISSUE EXAM BY PATHOLOGIST: CPT | Performed by: PATHOLOGY

## 2021-04-14 PROCEDURE — 42100 BIOPSY ROOF OF MOUTH: CPT | Performed by: OTOLARYNGOLOGY

## 2021-04-14 PROCEDURE — 99213 OFFICE O/P EST LOW 20 MIN: CPT | Mod: 25 | Performed by: OTOLARYNGOLOGY

## 2021-04-14 ASSESSMENT — MIFFLIN-ST. JEOR: SCORE: 783.25

## 2021-04-14 ASSESSMENT — PAIN SCALES - GENERAL: PAINLEVEL: NO PAIN (0)

## 2021-04-14 NOTE — NURSING NOTE
"Chief Complaint   Patient presents with     RECHECK     follow up      Blood pressure 136/80, pulse 71, temperature 97.8  F (36.6  C), resp. rate 15, height 1.575 m (5' 2\"), weight 44 kg (97 lb), SpO2 99 %, not currently breastfeeding.      Willie Tang LPN    "

## 2021-04-14 NOTE — PROGRESS NOTES
HISTORY OF PRESENT ILLNESS: Anca Alanis returns.  She is a 89-year-old lady who is about five months out from a wide local excision of a squamous cell carcinoma of the right palate.  The margins were close, but we did a limited procedure as we could, given her advanced age and dementia.  Pathology revealed close, but negative margins and ever since that has not quite healed right and I did biopsy at her last visit.  Today, she reports that still has some soreness on the right side.      PHYSICAL EXAMINATION:  When I examined the right palate, it again looks a little bit like a carpet lesion that is not quite smooth.  The granulation laterally where the tooth sockets were looks normal, but I am still concerned that the right palate mucosa does not look normal as it should at this point. The left palate and the remainder of the oral cavity is unremarkable.  She has no lymph nodes in her neck.       PROCEDURE:    I did take a biopsy in the mid portion of the right palate where the previous excision was done and this was taken with a 3 mm punch and cauterized with silver nitrate.  The patient tolerated the procedure well.      IMPRESSION:  Status post repeat biopsy.      PLAN: We will see her back in about three months.  If the biopsy is negative, at some point, the mucosa here should normalize if it is not cancer, but we will await the biopsy results.

## 2021-04-14 NOTE — LETTER
4/14/2021       RE: Anca Alanis  [address] [place] [address]     Dear Colleague,    Thank you for referring your patient, Anca Alanis, to the [facility] EAR NOSE AND THROAT CLINIC [place] at [facility]. Please see a copy of my visit note below.    HISTORY OF PRESENT ILLNESS: Anca Alanis returns.  She is a 89-year-old lady who is about five months out from a wide local excision of a squamous cell carcinoma of the right palate.  The margins were close, but we did a limited procedure as we could, given her advanced age and dementia.  Pathology revealed close, but negative margins and ever since that has not quite healed right and I did biopsy at her last visit.  Today, she reports that still has some soreness on the right side.      PHYSICAL EXAMINATION:  When I examined the right palate, it again looks a little bit like a carpet lesion that is not quite smooth.  The granulation laterally where the tooth sockets were looks normal, but I am still concerned that the right palate mucosa does not look normal as it should at this point. The left palate and the remainder of the oral cavity is unremarkable.  She has no lymph nodes in her neck.       PROCEDURE:    I did take a biopsy in the mid portion of the right palate where the previous excision was done and this was taken with a 3 mm punch and cauterized with silver nitrate.  The patient tolerated the procedure well.      IMPRESSION:  Status post repeat biopsy.      PLAN: We will see her back in about three months.  If the biopsy is negative, at some point, the mucosa here should normalize if it is not cancer, but we will await the biopsy results.       Sincerely,    Silverio Villalobos MD

## 2021-04-19 LAB — COPATH REPORT: NORMAL

## 2021-04-22 ENCOUNTER — PATIENT OUTREACH (OUTPATIENT)
Dept: OTOLARYNGOLOGY | Facility: CLINIC | Age: 86
End: 2021-04-22

## 2021-04-22 DIAGNOSIS — C05.0 CANCER OF HARD PALATE (H): Primary | ICD-10-CM

## 2021-04-22 NOTE — TELEPHONE ENCOUNTER
Called and left message for daughter in law and [employer] nurse with the following pathology results:    SPECIMEN(S):   Oral biopsy, right palate     FINAL DIAGNOSIS:   ORAL CAVITY, RIGHT PALATE, BIOPSY:   - Squamous hyperplasia with parakeratosis and marked hyperkeratosis,   process transected at the base, see   comment.   - Bacterial growth is present; GMS special stain results will be reported   in addendum.     COMMENT:   There is no definite evidence of dysplasia or malignancy in this biopsy.   There is no submucosa to assess for   an invasive process, however, concern for recurrence of squamous cell   carcinoma is low based on the histologic   features seen. The differential diagnosis includes leukoplakia, although   the possibility of verrucous   carcinoma cannot be entirely excluded. Clinical correlation is   recommended.       Advised that biopsy was negative for malignancy but we should continue to monitor. Dr. Villalobos would like to see patient back in 3 months for follow-up.     Patient is also due for CT neck and chest. We will have schedulers call to arrange.     Left direct line and encouraged return call with any further questions or concerns.       Ambar Aguilar, RN, BSN

## 2021-05-07 DIAGNOSIS — F02.80 LATE ONSET ALZHEIMER'S DISEASE WITHOUT BEHAVIORAL DISTURBANCE (H): ICD-10-CM

## 2021-05-07 DIAGNOSIS — G30.1 LATE ONSET ALZHEIMER'S DISEASE WITHOUT BEHAVIORAL DISTURBANCE (H): ICD-10-CM

## 2021-05-07 NOTE — TELEPHONE ENCOUNTER

## 2021-05-09 RX ORDER — MEMANTINE HYDROCHLORIDE 14 MG/1
14 CAPSULE, EXTENDED RELEASE ORAL DAILY
Qty: 90 CAPSULE | Refills: 1 | Status: SHIPPED | OUTPATIENT
Start: 2021-05-09 | End: 2022-01-02

## 2021-05-20 ENCOUNTER — TELEPHONE (OUTPATIENT)
Dept: OTOLARYNGOLOGY | Facility: CLINIC | Age: 86
End: 2021-05-20

## 2021-05-20 NOTE — TELEPHONE ENCOUNTER
[facility] Call Center    Phone Message    May a detailed message be left on voicemail: yes     Reason for Call: Other:   Adelia, Home Health Nurse is calling to inquire why pt has CT scans scheduled today. Adelia states that all scheduling should go through Adelia. Pt was not aware of her ct scans today. Please follow-up with Adelia to advise.     Action Taken: Other:  ent    Travel Screening: Not Applicable

## 2021-05-20 NOTE — TELEPHONE ENCOUNTER
Returned call to Eduin and left message indicating that patient was scheduled for her routine follow-up scans. Advised eduin to return call to writer to discuss and plan for follow-up imaging. Left direct line for return call.     Ambar Aguilar, RN, BSN

## 2021-05-23 ENCOUNTER — HEALTH MAINTENANCE LETTER (OUTPATIENT)
Age: 86
End: 2021-05-23

## 2021-07-14 ENCOUNTER — ANCILLARY PROCEDURE (OUTPATIENT)
Dept: CT IMAGING | Facility: CLINIC | Age: 86
End: 2021-07-14
Attending: OTOLARYNGOLOGY
Payer: MEDICARE

## 2021-07-14 ENCOUNTER — OFFICE VISIT (OUTPATIENT)
Dept: OTOLARYNGOLOGY | Facility: CLINIC | Age: 86
End: 2021-07-14
Payer: MEDICARE

## 2021-07-14 VITALS — WEIGHT: 97 LBS | TEMPERATURE: 97.5 F | OXYGEN SATURATION: 99 % | HEART RATE: 76 BPM | BODY MASS INDEX: 17.74 KG/M2

## 2021-07-14 DIAGNOSIS — C05.0 CANCER OF HARD PALATE (H): Primary | ICD-10-CM

## 2021-07-14 DIAGNOSIS — K13.79 LESION OF PALATE: ICD-10-CM

## 2021-07-14 DIAGNOSIS — C05.0 CANCER OF HARD PALATE (H): ICD-10-CM

## 2021-07-14 LAB
CREAT BLD-MCNC: 1.3 MG/DL (ref 0.5–1)
GFR SERPL CREATININE-BSD FRML MDRD: 34 ML/MIN/1.73M2

## 2021-07-14 PROCEDURE — 11104 PUNCH BX SKIN SINGLE LESION: CPT | Performed by: OTOLARYNGOLOGY

## 2021-07-14 PROCEDURE — 99213 OFFICE O/P EST LOW 20 MIN: CPT | Mod: 25 | Performed by: OTOLARYNGOLOGY

## 2021-07-14 PROCEDURE — G1004 CDSM NDSC: HCPCS | Mod: GC | Performed by: STUDENT IN AN ORGANIZED HEALTH CARE EDUCATION/TRAINING PROGRAM

## 2021-07-14 PROCEDURE — 88305 TISSUE EXAM BY PATHOLOGIST: CPT | Mod: TC | Performed by: OTOLARYNGOLOGY

## 2021-07-14 PROCEDURE — 71260 CT THORAX DX C+: CPT | Mod: MG | Performed by: RADIOLOGY

## 2021-07-14 PROCEDURE — 70491 CT SOFT TISSUE NECK W/DYE: CPT | Mod: MG | Performed by: STUDENT IN AN ORGANIZED HEALTH CARE EDUCATION/TRAINING PROGRAM

## 2021-07-14 PROCEDURE — 88305 TISSUE EXAM BY PATHOLOGIST: CPT | Mod: 26 | Performed by: PATHOLOGY

## 2021-07-14 PROCEDURE — G1004 CDSM NDSC: HCPCS | Mod: GC | Performed by: RADIOLOGY

## 2021-07-14 RX ORDER — IOPAMIDOL 755 MG/ML
48 INJECTION, SOLUTION INTRAVASCULAR ONCE
Status: COMPLETED | OUTPATIENT
Start: 2021-07-14 | End: 2021-07-14

## 2021-07-14 RX ADMIN — IOPAMIDOL 48 ML: 755 INJECTION, SOLUTION INTRAVASCULAR at 12:49

## 2021-07-14 ASSESSMENT — PAIN SCALES - GENERAL: PAINLEVEL: NO PAIN (0)

## 2021-07-14 NOTE — NURSING NOTE
Chief Complaint   Patient presents with     RECHECK     3 month follow up      Pulse 76, temperature 97.5  F (36.4  C), weight 44 kg (97 lb), SpO2 99 %, not currently breastfeeding.    Willie Tang LPN

## 2021-07-14 NOTE — PROGRESS NOTES
HISTORY OF PRESENT ILLNESS: Anca Alanis returns to clinic for follow up.  She is a 89-year-old lady who is about eight months out from a wide local excision of a squamous cell carcinoma of the right palate. We did a limited procedure as we could, given her advanced age and dementia.  Pathology revealed close, but negative margins and ever since that has not quite healed right. A biopsy was taken at her last visit in April which showed hyperkeratosis, but no malignancy.  Today, she reports the lesion has grown slightly and still occasionally painful, especially after eating crunchy foods like popcorn and crackers. She denies numbness, tingling, and bleeding from the site. This palatal lesion does not prevent her from eating full meals.     PHYSICAL EXAMINATION:  The right palate appears white and friable and extends laterally to the maxillary alveolar ridge. There are erythematous lesions where her right upper teeth were removed which are believed to be maxillary mucosa pouching into the oral cavity. There is no bleeding. The left palate and the remainder of the oral cavity is unremarkable.  She has no lymph nodes in her neck.       PROCEDURE:  A punch biopsy was taken from the lateral portion of right palate and cauterized with silver nitrate.  The patient tolerated the procedure well.      IMPRESSION:  Continued abnormal right palatal lesion status post repeat biopsy.      PLAN: We will follow up on the results of today's biopsy. If the biopsy is positive, she can follow up to discuss treatment options.     Rowena Matthews, MS4      I, Silverio Villalobos MD, was present with the medical student who participated in the service and in the documentation of the note.  I have verified the history and personally performed the physical exam and medical decision making.  I agree with the assessment and plan of care as documented in the note.

## 2021-07-14 NOTE — LETTER
7/14/2021       RE: Anca Alanis  [address] [place] [address]     Dear Colleague,    Thank you for referring your patient, Anca Alanis, to the [facility] EAR NOSE AND THROAT CLINIC [place] at [facility]. Please see a copy of my visit note below.    HISTORY OF PRESENT ILLNESS: Anca Alanis returns to clinic for follow up.  She is a 89-year-old lady who is about eight months out from a wide local excision of a squamous cell carcinoma of the right palate. We did a limited procedure as we could, given her advanced age and dementia.  Pathology revealed close, but negative margins and ever since that has not quite healed right. A biopsy was taken at her last visit in April which showed hyperkeratosis, but no malignancy.  Today, she reports the lesion has grown slightly and still occasionally painful, especially after eating crunchy foods like popcorn and crackers. She denies numbness, tingling, and bleeding from the site. This palatal lesion does not prevent her from eating full meals.     PHYSICAL EXAMINATION:  The right palate appears white and friable and extends laterally to the maxillary alveolar ridge. There are erythematous lesions where her right upper teeth were removed which are believed to be maxillary mucosa pouching into the oral cavity. There is no bleeding. The left palate and the remainder of the oral cavity is unremarkable.  She has no lymph nodes in her neck.       PROCEDURE:  A punch biopsy was taken from the lateral portion of right palate and cauterized with silver nitrate.  The patient tolerated the procedure well.      IMPRESSION:  Continued abnormal right palatal lesion status post repeat biopsy.      PLAN: We will follow up on the results of today's biopsy. If the biopsy is positive, she can follow up to discuss treatment options.     Rowena Matthews, MS4      Again, thank you for allowing me to  participate in the care of your patient.      Sincerely,    Silverio Villalobos MD

## 2021-07-14 NOTE — PATIENT INSTRUCTIONS
1. We will call with biopsy results and scan results.   2. Please call the ENT clinic with any questions,concerns, new or worsening symptoms.    -Clinic number is [phone]   - Ambar's direct line (Dr. Villalobos's nurse) [phone]

## 2021-07-15 LAB
PATH REPORT.COMMENTS IMP SPEC: NORMAL
PATH REPORT.COMMENTS IMP SPEC: NORMAL
PATH REPORT.FINAL DX SPEC: NORMAL
PATH REPORT.GROSS SPEC: NORMAL
PATH REPORT.MICROSCOPIC SPEC OTHER STN: NORMAL
PATH REPORT.RELEVANT HX SPEC: NORMAL
PHOTO IMAGE: NORMAL

## 2021-07-30 ENCOUNTER — TUMOR CONFERENCE (OUTPATIENT)
Dept: ONCOLOGY | Facility: CLINIC | Age: 86
End: 2021-07-30
Payer: MEDICARE

## 2021-07-30 NOTE — PROGRESS NOTES
Head & Neck Tumor Conference Note        Status: Established  Staff: Dr. Villalobos     Tumor Site: Right hard palate  Tumor Pathology: SCC  Tumor Stage: cT2 vs T4a NxMx  Tumor Treatment: None     Reason for Review: Review imaging, path, and POC     Brief History: This is a 89 year old female who was recently noted to have a lesion in the oral cavity with focus of early invasive squamous cell carcinoma found by her dentist. CT was obtained which demonstrated possible bony erosion. MRI was obtained, which showed symmetric dehiscence in the palate more consistent with anatomic variant than tumor invasion. She underwent a mucosal excision of the hard palate SCC on 20. Here to review pathology.     Pertinent PMH:   Past Medical History:   Diagnosis Date     Aortic stenosis      Cataracts, both eyes      Dementia (H)      Glaucoma suspect         Smoking Hx:   Social History     Tobacco Use     Smoking status: Former Smoker     Packs/day: 0.00     Years: 0.00     Pack years: 0.00     Types: Cigarettes     Quit date: 1960     Years since quittin.6     Smokeless tobacco: Never Used   Substance Use Topics     Alcohol use: No     Drug use: No       Pathology:   FINAL DIAGNOSIS (20):   A. Posterior margin:   - Benign squamous mucosa, negative for dysplasia     B. Lateral margin:   - Benign squamous mucosa, negative for dysplasia     C. Medial margin:   - Benign squamous mucosa, negative for dysplasia     D. Anterior margin:   - Benign skin with mucosa, negative for dysplasia     E. Tooth:   - Tooth fragments as described grossly     F. Wide local excision of right palate:   - Invasive well differentiated squamous cell carcinoma   - Tumor size: 4.3 cm   - The depth invasion: Approximately 0.5 cm   - Perineural invasion: Not identified   - Angiolymphatic invasion: Not identified   - Peripheral resection margins free of tumor   - Deep resection margin cannot be determined (see comment)     COMMENT:   The tumor is  present at the cauterized and inked deep surface.  However,   this area is associated with the   defect due to tooth and therefore, the deep resection cannot be assessed   with certainty.  Clinical correlation   is recommended.     Imaging  7/14/21  Postsurgical changes of wide local excision of hard palate squamous  cell carcinoma. There is residual villus soft tissue thickening of the  hard palate which appears to be infiltrating the associated cortical  bone with defects of the inferior nasal bone as well as wide defect in  the inferior right maxillary sinus which was not present nor  surgically created according to review of the operative report.  Additionally near complete opacification of the right maxillary sinus  as well as floating appearance of the right maxillary incisor which  may be secondary to bony erosion of the associated maxillary bone.  Findings are concerning for tumor recurrence however an infiltrating  fungal infection may appear similarly. Notably there is no evidence of  lymphadenopathy.    Tumor Board Recommendation:   Discussion: Ms. Alanis is a 89F with history of SCC of the hard palate s/p mucosal resection.There is clinical concern for recurrence. This has been biopsied multiple times as negative but it is not healing well and appears c/f cancer. Imaging was reviewed which showed some increased size of hypodense lesion and more bone loss. Radiology suggested MRI vs PET-CT, but this is not of clear utility.     Plan:   - discuss with patient that re-resection is likely necessary, will need denture vs obturator post-op    Gregory Horta MD      Documentation / Disclaimer Cancer Tumor Board Note  Cancer tumor board recommendations do not override what is determined to be reasonable care and treatment, which is dependent on the circumstances of a patient's case; the patient's medical, social, and personal concerns; and the clinical judgment of the oncologist [physician].

## 2021-07-30 NOTE — PROGRESS NOTES
Called and spoke with patient's nurse regarding the following pathology results and CT Scan results:    Final Diagnosis   A.  ORAL CAVITY, RIGHT PALATE, BIOPSY:  -Squamous mucosa with hyperplasia, para and hyperkeratosis (leukoplakia-like change).  -No definite evidence of dysplasia identified (process transected the base).     IMPRESSION: Fibroatelectatic changes in the inferomedial right upper  lobe with an associated 4 mm solid nodule which may represent  additional focus of scarring. Consider short-term interval imaging  follow-up in 6-12 months to ensure stability.    Impression:     Postsurgical changes of wide local excision of hard palate squamous  cell carcinoma. There is residual villus soft tissue thickening of the  hard palate which appears to be infiltrating the associated cortical  bone with defects of the inferior nasal bone as well as wide defect in  the inferior right maxillary sinus which was not present nor  surgically created according to review of the operative report.  Additionally near complete opacification of the right maxillary sinus  as well as floating appearance of the right maxillary incisor which  may be secondary to bony erosion of the associated maxillary bone.  Findings are concerning for tumor recurrence however an infiltrating  fungal infection may appear similarly. Notably there is no evidence of  lymphadenopathy.       Discussed with Adelia that based on imaging there is continued concern for possible recurrence at the palate site but all biopsies are negative. Reviewed that typically Dr. Villalobos would recommend proceeding with resection of this abnormal area but this would like leave patient needing obturator. Adelia indicates that patient would likely not do well with this but she wishes to have Dr. Villalobos discuss with family to ensure they do not want patient to proceed with this.     We will arrange for virtual visit with family and Dr. Villalobos to further discuss. Adelia will  communicate with family and will update writer if they have any concerns prior to appointment.     Ambar Aguilar RN, BSN

## 2021-08-11 ENCOUNTER — VIRTUAL VISIT (OUTPATIENT)
Dept: OTOLARYNGOLOGY | Facility: CLINIC | Age: 86
End: 2021-08-11
Payer: MEDICARE

## 2021-08-11 VITALS — BODY MASS INDEX: 17.56 KG/M2 | WEIGHT: 96 LBS

## 2021-08-11 DIAGNOSIS — C05.0 CANCER OF HARD PALATE (H): Primary | ICD-10-CM

## 2021-08-11 DIAGNOSIS — N18.31 STAGE 3A CHRONIC KIDNEY DISEASE (H): ICD-10-CM

## 2021-08-11 PROCEDURE — 99213 OFFICE O/P EST LOW 20 MIN: CPT | Mod: 95 | Performed by: OTOLARYNGOLOGY

## 2021-08-11 ASSESSMENT — PAIN SCALES - GENERAL: PAINLEVEL: NO PAIN (0)

## 2021-08-11 NOTE — LETTER
8/11/2021       RE: Anca Alanis  [address] [place] [address]     Dear Colleague,    Thank you for referring your patient, Anca Alanis, to the [facility] EAR NOSE AND THROAT CLINIC [place] at [facility]. Please see a copy of my visit note below.    Ms. Alanis is seen today in a video visit with several of her family members.  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  Since that time, she has had abnormal-appearing lesion essentially replacing the entire area that I feel looks verrucous in nature.  I have taken three biopsies; however, none of them have come back positive.  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      On a recent CT scan, it showed some possible bone destruction of the floor of the maxillary sinus that would again support the diagnosis of recurrent cancer.    Complicating all this is the fact that she is 89, and a general anesthetic is very risky for her.  Also, they would very much like to avoid the possibility of an obturator.    I spent 15 minutes with the patient and her family today on the video call.  I discussed the options of performing a resection now with the attendant risks related to general anesthesia and also the likelihood that she would require an obturator if we were to do this.  On the other, the lesion that she has, even if it was verrucous carcinoma is not causing any discomfort and is unlikely to metastasize or cause any distant problems even regional problems.  Given that, one other option that I proposed to them would be to rescan her in two to three months and reassess for bone destruction, etc. and make a decision at that time as to which way to move on this lesion.  They were very much in favor of the latter plan  and would like to have her re-imaged at that time, they also were counseled that even if we did undertake a surgical resection, right now it is possible that no cancer be found and it was simply hyperkeratosis, which is what has come from the biopsies.    We will schedule a CT scan for in two to three months from now and have visit after that to discuss the findings.          Again, thank you for allowing me to participate in the care of your patient.      Sincerely,    Silverio Villalobos MD

## 2021-08-11 NOTE — PROGRESS NOTES
Ms. Alanis is seen today in a video visit with several of her family members.  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  Since that time, she has had abnormal-appearing lesion essentially replacing the entire area that I feel looks verrucous in nature.  I have taken three biopsies; however, none of them have come back positive.  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      On a recent CT scan, it showed some possible bone destruction of the floor of the maxillary sinus that would again support the diagnosis of recurrent cancer.    Complicating all this is the fact that she is 89, and a general anesthetic is very risky for her.  Also, they would very much like to avoid the possibility of an obturator.    I spent 15 minutes with the patient and her family today on the video call.  I discussed the options of performing a resection now with the attendant risks related to general anesthesia and also the likelihood that she would require an obturator if we were to do this.  On the other, the lesion that she has, even if it was verrucous carcinoma is not causing any discomfort and is unlikely to metastasize or cause any distant problems even regional problems.  Given that, one other option that I proposed to them would be to rescan her in two to three months and reassess for bone destruction, etc. and make a decision at that time as to which way to move on this lesion.  They were very much in favor of the latter plan and would like to have her re-imaged at that time, they also were counseled that even if we did undertake a surgical resection, right now it is possible that no cancer be found and it was simply hyperkeratosis, which is what has come from the biopsies.    We will schedule a CT scan for in two to three months from now and have visit after  that to discuss the findings.

## 2021-08-17 PROBLEM — N18.30 CHRONIC KIDNEY DISEASE, STAGE 3 (H): Status: ACTIVE | Noted: 2021-08-17

## 2021-09-12 ENCOUNTER — HEALTH MAINTENANCE LETTER (OUTPATIENT)
Age: 86
End: 2021-09-12

## 2021-10-13 ENCOUNTER — ANCILLARY PROCEDURE (OUTPATIENT)
Dept: CT IMAGING | Facility: CLINIC | Age: 86
End: 2021-10-13
Attending: OTOLARYNGOLOGY
Payer: MEDICARE

## 2021-10-13 ENCOUNTER — OFFICE VISIT (OUTPATIENT)
Dept: OTOLARYNGOLOGY | Facility: CLINIC | Age: 86
End: 2021-10-13
Payer: MEDICARE

## 2021-10-13 VITALS
BODY MASS INDEX: 16.9 KG/M2 | HEIGHT: 64 IN | HEART RATE: 80 BPM | TEMPERATURE: 97.6 F | WEIGHT: 99 LBS | OXYGEN SATURATION: 95 %

## 2021-10-13 DIAGNOSIS — C05.0 CANCER OF HARD PALATE (H): ICD-10-CM

## 2021-10-13 DIAGNOSIS — C05.0 CANCER OF HARD PALATE (H): Primary | ICD-10-CM

## 2021-10-13 LAB
CREAT BLD-MCNC: 1 MG/DL (ref 0.5–1)
GFR SERPL CREATININE-BSD FRML MDRD: 47 ML/MIN/1.73M2

## 2021-10-13 PROCEDURE — 70491 CT SOFT TISSUE NECK W/DYE: CPT | Mod: MG | Performed by: RADIOLOGY

## 2021-10-13 PROCEDURE — G1004 CDSM NDSC: HCPCS | Performed by: RADIOLOGY

## 2021-10-13 PROCEDURE — 99213 OFFICE O/P EST LOW 20 MIN: CPT | Performed by: OTOLARYNGOLOGY

## 2021-10-13 RX ORDER — IOPAMIDOL 755 MG/ML
100 INJECTION, SOLUTION INTRAVASCULAR ONCE
Status: COMPLETED | OUTPATIENT
Start: 2021-10-13 | End: 2021-10-13

## 2021-10-13 RX ADMIN — IOPAMIDOL 100 ML: 755 INJECTION, SOLUTION INTRAVASCULAR at 16:21

## 2021-10-13 ASSESSMENT — MIFFLIN-ST. JEOR: SCORE: 819.06

## 2021-10-13 ASSESSMENT — PAIN SCALES - GENERAL: PAINLEVEL: NO PAIN (0)

## 2021-10-13 NOTE — LETTER
10/13/2021       RE: Anca Alanis  [address] [place] [address]     Dear Colleague,    Thank you for referring your patient, Anca Alanis, to the [facility] EAR NOSE AND THROAT CLINIC [place] at [facility]. Please see a copy of my visit note below.    PRIOR ONCOLOGIC HISTORY:  Ms. Alanis is a 89-year-old woman who is status post removal of a squamous cell carcinoma in the right wei-palate.  This was done in a way to avoid entering the maxillary sinus at the patient and family were very much against having an obturator so I elected to keep the palate intact and remove the tumor, which essentially was done with a narrow margin excision.  Since that time, she has had an abnormal-appearing area in the right wei-palate and it never quite normally re-mucosalized.  She has had multiple biopsies done of the area, none of which have returned a cancer diagnosis.  However, the appearance on CT scan has been suspicious, so we elected to get a new CT scan today to assess any change.    INTERVAL HISTORY:  The patient notes she is having a little bit of pain in the right cheek, but otherwise does not feel much different than prior.  She has not had any bleeding in her mouth.  She has had no changes in her vision.  She denies any numbness in her cheek and is eating and drinking essentially the same way.    PHYSICAL EXAMINATION:  She is well-appearing, in no distress.  She is quite hard of hearing given her advanced age.  She presents with her granddaughter.  Examination of the oral cavity again reveals a carpet-like lesion on the right wei-palate.  It does not appear to expand very much in the palate, but does encompass the entire right wei-palate.  When I push on her cheeks, she is tender here and this seemed to be a new exam findings.  She has a subtle swelling overlying the anterior face of the right maxillary sinus.  She has not had  any vision changes or changes in her extraocular movements.  Examination of her neck reveals no mass or lymphadenopathy.    IMAGING:  The patient has a new CT scan to my eye looks to show growth of what I believe is tumor in the floor of the maxillary sinus.    IMPRESSION:  Concern for recurrent tumor growth.    PLAN:  We will review her case at tumor board, but the family indicated they do not want surgery that would involve an obturator, which would rule out infrastructure maxillectomy.  They would consider radiation or systemic therapy if she was a candidate.  We will discuss this at our tumor board and if possible, she would like to have a video visit with all of her family members present.  I indicated that we should be able to make this happen.          Again, thank you for allowing me to participate in the care of your patient.      Sincerely,    Silverio Villalobos MD

## 2021-10-15 ENCOUNTER — TUMOR CONFERENCE (OUTPATIENT)
Dept: ONCOLOGY | Facility: CLINIC | Age: 86
End: 2021-10-15
Payer: MEDICARE

## 2021-10-15 DIAGNOSIS — C06.9 SQUAMOUS CELL CARCINOMA OF ORAL CAVITY (H): Primary | ICD-10-CM

## 2021-10-15 NOTE — PROGRESS NOTES
Called and spoke with patient's nurse eduin regarding the following CT scan results:    Impression:  Significant increase in the size of ulcerative mass centered at the  maxillectomy defect with increased invasion of the right maxillary  sinus walls, nasal septum and residual left maxilla.        Reviewed with Eduin that although biopsies have not shown cancer, the progressive findings on CT are indicating cancer. Patient does not want to proceed with any surgery which would lead to the need for obturator but would possibly proceed with some palliative radiation.     Reviewed with Eduin that Dr. Ragland would be open to doing video visit with family to discuss possible course of palliative radiation. Eduin would like to be the contact to schedule this with family.     Radiation referral placed and patient will be contacted by radiation scheduling to arrange video consult.       Ambar Aguilar, RN, BSN

## 2021-10-15 NOTE — TUMOR CONFERENCE
Head & Neck Tumor Conference Note     Status: Established (last presented 2021)  Staff: Dr. Villalobos    Tumor Site: Right hard palate  Tumor Pathology: SCC  Tumor Stage: cT2 vs T4a NxMx  Tumor Treatment:   (1) Mucosal excision of the hard palate SCC (20)    Reason for Review: Review imaging, path, and POC     Brief History: Anca Alanis is a 89 year old female who was recently noted to have a lesion in the oral cavity with focus of early invasive squamous cell carcinoma found by her dentist. CT was obtained which demonstrated possible bony erosion. MRI was obtained, which showed symmetric dehiscence in the palate more consistent with anatomic variant than tumor invasion. She underwent a mucosal excision of the hard palate SCC on 20.     Pertinent PMH:   Past Medical History:   Diagnosis Date     Aortic stenosis      Cataracts, both eyes      Dementia (H)      Glaucoma suspect       Smoking Hx:   Social History     Tobacco Use     Smoking status: Former Smoker     Packs/day: 0.00     Years: 0.00     Pack years: 0.00     Types: Cigarettes     Quit date: 1960     Years since quittin.8     Smokeless tobacco: Never Used   Substance Use Topics     Alcohol use: No     Drug use: No     Imaging:   CT neck with contrast (10/13/2021):  Findings:   Significant increase in the size of the recurrent ulcerative mass at the surgery site measuring 2.8 x 4.1 cm. There is increased invasion along the walls of the right maxillary sinus, nasal septum and the residual maxilla.   Interval removal of right lateral maxillary incisor. There is new floating appearance of the medial maxillary incisor. Again mucosal thickening and debris in the right maxillary sinus with complete opacification.     Evaluation of the mucosal space demonstrates no evident abnormality in the nasopharynx, oropharynx, hypopharynx or the glottis. The tongue base appears normal. The major salivary glands appear unremarkable.  The thyroid  gland appears normal.     There is no evident cervical lymphadenopathy. The fascial spaces in the neck are intact bilaterally. The major vascular structures in the neck appear unremarkable.     Evaluation of the osseous structures demonstrate no worrisome lytic or sclerotic lesion. Multilevel cervical spondylosis with severe spinal canal and bilateral neural foraminal stenosis at C5-6 and C6-7. The  visualized paranasal sinuses are clear. The mastoid air cells are clear.      The visualized lung apices are clear.     Mild diffuse cerebral volume loss and patchy periventricular hypoattenuation, consistent with chronic small vessel ischemic disease.                                                                       Impression:  Significant increase in the size of ulcerative mass centered at the maxillectomy defect with increased invasion of the right maxillary sinus walls, nasal septum and residual left maxilla.    Pathology:   ***    Tumor Board Recommendation:   Discussion: ***    Imaging review: There is progression in size. Could consider CT-guided biopsy.     Pathology review:    Plan: Palliative radiation.    Charu Gonzalez MD PGY-3  Otolaryngology - Head and Neck Surgery    Documentation / Disclaimer Cancer Tumor Board Note  Cancer tumor board recommendations do not override what is determined to be reasonable care and treatment, which is dependent on the circumstances of a patient's case; the patient's medical, social, and personal concerns; and the clinical judgment of the oncologist [physician].

## 2021-10-15 NOTE — PROGRESS NOTES
PRIOR ONCOLOGIC HISTORY:  Ms. Alanis is a 89-year-old woman who is status post removal of a squamous cell carcinoma in the right wei-palate.  This was done in a way to avoid entering the maxillary sinus at the patient and family were very much against having an obturator so I elected to keep the palate intact and remove the tumor, which essentially was done with a narrow margin excision.  Since that time, she has had an abnormal-appearing area in the right wei-palate and it never quite normally re-mucosalized.  She has had multiple biopsies done of the area, none of which have returned a cancer diagnosis.  However, the appearance on CT scan has been suspicious, so we elected to get a new CT scan today to assess any change.    INTERVAL HISTORY:  The patient notes she is having a little bit of pain in the right cheek, but otherwise does not feel much different than prior.  She has not had any bleeding in her mouth.  She has had no changes in her vision.  She denies any numbness in her cheek and is eating and drinking essentially the same way.    PHYSICAL EXAMINATION:  She is well-appearing, in no distress.  She is quite hard of hearing given her advanced age.  She presents with her granddaughter.  Examination of the oral cavity again reveals a carpet-like lesion on the right wei-palate.  It does not appear to expand very much in the palate, but does encompass the entire right wei-palate.  When I push on her cheeks, she is tender here and this seemed to be a new exam findings.  She has a subtle swelling overlying the anterior face of the right maxillary sinus.  She has not had any vision changes or changes in her extraocular movements.  Examination of her neck reveals no mass or lymphadenopathy.    IMAGING:  The patient has a new CT scan to my eye looks to show growth of what I believe is tumor in the floor of the maxillary sinus.    IMPRESSION:  Concern for recurrent tumor growth.    PLAN:  We will review her  case at tumor board, but the family indicated they do not want surgery that would involve an obturator, which would rule out infrastructure maxillectomy.  They would consider radiation or systemic therapy if she was a candidate.  We will discuss this at our tumor board and if possible, she would like to have a video visit with all of her family members present.  I indicated that we should be able to make this happen.

## 2021-10-29 ENCOUNTER — VIRTUAL VISIT (OUTPATIENT)
Dept: RADIATION ONCOLOGY | Facility: CLINIC | Age: 86
End: 2021-10-29
Attending: OTOLARYNGOLOGY
Payer: MEDICARE

## 2021-10-29 DIAGNOSIS — C06.9 SQUAMOUS CELL CARCINOMA OF ORAL CAVITY (H): ICD-10-CM

## 2021-10-29 NOTE — PROGRESS NOTES
INITIAL PATIENT ASSESSMENT    Diagnosis: SCC of hard palate    Prior radiation therapy: None    Prior chemotherapy: None    Prior hormonal therapy:No    Pain Eval:  Current history of pain associated with this visit:   Intensity: 4/10  Current: burning  Location: mouth  Treatment: APAP    Psychosocial  Living arrangements: at home with , caretakers 4 hours a day  Fall Risk: independent   referral needs: Not needed    Advanced Directive: No  Implantable Cardiac Device? No      LMP: No LMP recorded (lmp unknown). Patient is postmenopausal.  Onset of menopause: unknown  Abnormal vaginal bleeding/discharge: No  Are you pregnant? No  Reproductive note: living children    Nurse face-to-face time: Level 3:  10 min face to face time          Review of Systems   HENT:        Loose teeth, right mouth   All other systems reviewed and are negative.

## 2021-11-02 NOTE — PROGRESS NOTES
Department of Radiation Oncology  [address] [address]  [phone]       Video visit consultation note    Name: Anca Alanis MRN: [id number]   : 1924   Date of Service: 10/29/2021  Referring: Dr. 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 Further surgical intervention is felt to be too morbid given her advanced age and medical comorbidities. Surgical pathology (specimen: [id number]) reveals a 4.3 cm well-differentiated squamous cell carcinoma with 0.5 cm depth of invasion. There is no perineural invasion or lymphovascular space invasion and the peripheral margins are clear. The deep margin is unable to be assessed secondary to cautery artifact.    10/13/2021: CT neck for oncologic surveillance reveals a large ulcerative mass within the operative bed measuring 2.8 x 4.1 cm with invasion along the walls of the right maxillary sinus, nasal septum and residual maxilla. There is no evidence of cervical adenopathy.    10/15/2021: Ms. Alanis's case is discussed at the [facility]'s interdisciplinary head and neck tumor board. Given the concern for her ability to tolerate aggressive treatment, she is referred to radiation oncology for consideration of palliative treatment for improved local disease control purposes.    Past Medical History:    Glaucoma    Cataracts (bilateral)    Aortic stenosis    Dementia    Past Surgical History:    Wide local excision of right palate squamous cell carcinoma    Chemotherapy History:  None    Radiation History:  None    Pregnant: No  Implanted Cardiac Devices: No    Medications:  Current Outpatient Medications   Medication Sig Dispense  Refill     acetaminophen (TYLENOL) 325 MG tablet Take 2 tablets (650 mg) by mouth At Bedtime (Patient taking differently: Take 650 mg by mouth as needed ) 100 tablet      calcium carbonate 500 mg, elemental, (OSCAL 500) 1250 (500 Ca) MG TABS tablet Take by mouth every evening       melatonin 3 MG tablet Take 1 tablet (3 mg) by mouth At Bedtime 90 tablet 3     memantine XR (NAMENDA XR) 14 MG 24 hr capsule Take 1 capsule (14 mg) by mouth daily 90 capsule 1      Allergies:    Cipro    Sulfa    Social History:  Tobacco: Former smoker  Alcohol: None  Lives in [place], [place]    Family History:    No family history of head and neck cancers    Review of Systems   A 10-point review of systems was performed. Pertinent positives include:     Loose maxillary dentition    Physical Exam   ECOG Status: 3    Remainder of examination deferred secondary to video visit nature of the encounter    Imaging/Path/Labs   Imaging: Reviewed    Path: Reviewed    Labs: Reviewed    Assessment    Ms. 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    Plan   I had an extensive discussion regarding Ms. Alanis's treatment options with her, her  and her son. Briefly, I discussed 4 potential options available to her. The first consisted of continued observation/hospice cares. While this option would not have any of the radiation-induced side effects, I would anticipate that the toxicities associated with local tumor progression would far outweigh the potentially mild, transient radiation-induced toxicities and thus I did not particularly favor this option. The second option consisted of aggressive therapy with definitive-intent radiation doses. Given Ms. Alanis's age and medical comorbidities I have significant doubts that she would be able to tolerate such a regimen due to the anticipated toxicities.      That instead brought us to the third and fourth options which consisted of 2 different  courses of palliative radiation therapy. The first of these consisted of a palliative course of therapy delivered over 2 weeks in 10 daily fractions with the second consisting of palliative 'quad shot' radiation therapy delivered as twice daily treatments x2 days and repeated for up to 3 times total depending upon her tolerance and efficacy of treatment.    I spent a significant amount of time discussing the logistics as well as the pros/cons associated with the last 2 palliative care options. At the conclusion of our visit, Ms. Alanis and her son were undetermined on how they wished to proceed. I will have them discuss this amongst themselves over this coming weekend and will have my team touch base with them early next week to see if they have arrived at a plan of care moving forward. Should she elect to proceed with radiation therapy, I will get her scheduled in clinic to sign consent and undergo a CT simulation session in the next 1-2 weeks with treatment start shortly thereafter.    Raj Ragland MD/PhD    Dept of Radiation Oncology  [facility]     Video visit start time: 1013  Video visit end time: 1050   Video visit duration: 37 minutes

## 2021-11-10 ENCOUNTER — OFFICE VISIT (OUTPATIENT)
Dept: RADIATION ONCOLOGY | Facility: CLINIC | Age: 86
End: 2021-11-10
Attending: RADIOLOGY
Payer: MEDICARE

## 2021-11-10 DIAGNOSIS — C05.0 CANCER OF HARD PALATE (H): Primary | ICD-10-CM

## 2021-11-10 PROCEDURE — 77334 RADIATION TREATMENT AID(S): CPT | Performed by: RADIOLOGY

## 2021-11-10 PROCEDURE — 77334 RADIATION TREATMENT AID(S): CPT | Mod: 26 | Performed by: RADIOLOGY

## 2021-11-10 NOTE — LETTER
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     Briefly, she was initially diagnosed 2020 and underwent a wide local excision in 2020. More recently, CT neck 10/13/21 revealed a large ulcerative mass within the operative bed measuring 2.8 x 4.1 cm with invasion along the walls of the right maxillary sinus, nasal septum and residual maxilla. There is no evidence of cervical adenopathy.    Ms. Alanis's case was discussed at the [facility]'s interdisciplinary head and neck tumor board. Given the concern for her ability to tolerate aggressive treatment, she was referred to radiation oncology for consideration of palliative treatment for improved local disease control purposes. She was seen for palliative radiation consultation 10/29/21.      Today, she presents to clinic with her nurse and they report the primary lesion has increased in size over the past 2 weeks. It is now associated with some skin redness just above the right upper lip. She is not having much pain right now.     OBJECTIVE:   No vital signs obtained  GENERAL:  Elderly, thin, in no acute distress, sitting in a chair  HEENT: Large tumor visualized of the right palate without any other lesions in the oral cavity. No palpable cervical adenopathy.   CV: Good distal perfusion.   RESP: No wheezing, breathing comfortably on room air  SKIN: Area of erythema without desquamation on the right side of face, just lateral to nose and above the right upper lip  "with palpable mass underneath    IMPRESSION: Ms. 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     RECOMMENDATIONS:   Palliative radiation was again discussed with the patient and her nurse. Specifically, we discussed doing the \"Quad Shot\" approach, which would involve 2 treatments daily for 2 days (total of 4), and this can be repeated in ~3 weeks. The potential acute and late side effects were reviewed. Informed consent was obtained. She will undergo CT simulation this afternoon with treatment to start 11/18/21.      Stella García MD PGY-3  Radiation Oncology      Attending addendum:   I saw and examined the patient with the resident and agree with the documented plan of care.     Ms. Alanis is a 89-year-old female with a rcT4 N0 M0 squamous cell carcinoma of the palate who presents for consideration of palliative radiotherapy. For complete details regarding her oncologic history, please see my initial consultation note from 10/29/2021.    On examination, she has a large exophytic tumor encompassing the entirety of the bilateral hard palate and anterior aspect of the soft palate. She additionally has obvious disease involvement of the right maxillary sinus with infiltration into the overlying soft tissues and skin involvement. Given her advanced age and poor baseline functional status, I recommended treatment with a short course of palliative radiation therapy consisting of 1480 cGy in 4 fractions delivered in a twice daily manner to potentially be repeated 2 additional times 3 weeks apart for cumulative dose of 4440 cGy pending her tolerance of therapy. While this treatment will not have a high chance of cure, it will provide some degree of local control for her rapidly progressive disease.    Ms. Alanis was in agreement with these recommendations and signed informed consent to that effect. I will tentatively have her return to clinic next Thursday, 11/18/2021, " to commence therapy.    Raj Ragland MD/PhD    Dept of Radiation Oncology  [facility]

## 2021-11-10 NOTE — PROGRESS NOTES
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     Briefly, she was initially diagnosed 2020 and underwent a wide local excision in 2020. More recently, CT neck 10/13/21 revealed a large ulcerative mass within the operative bed measuring 2.8 x 4.1 cm with invasion along the walls of the right maxillary sinus, nasal septum and residual maxilla. There is no evidence of cervical adenopathy.    Ms. Alanis's case was discussed at the [facility]'s interdisciplinary head and neck tumor board. Given the concern for her ability to tolerate aggressive treatment, she was referred to radiation oncology for consideration of palliative treatment for improved local disease control purposes. She was seen for palliative radiation consultation 10/29/21.      Today, she presents to clinic with her nurse and they report the primary lesion has increased in size over the past 2 weeks. It is now associated with some skin redness just above the right upper lip. She is not having much pain right now.     OBJECTIVE:   No vital signs obtained  GENERAL:  Elderly, thin, in no acute distress, sitting in a chair  HEENT: Large tumor visualized of the right palate without any other lesions in the oral cavity. No palpable cervical adenopathy.   CV: Good distal perfusion.   RESP: No wheezing, breathing comfortably on room air  SKIN: Area of erythema without desquamation on the right side of face, just lateral to nose and above the right upper lip with palpable mass underneath    IMPRESSION: Ms. Alanis is a 89 year old woman with rcT4 N0 M0  "squamous cell carcinoma of the right palate.     RECOMMENDATIONS:   Palliative radiation was again discussed with the patient and her nurse. Specifically, we discussed doing the \"Quad Shot\" approach, which would involve 2 treatments daily for 2 days (total of 4), and this can be repeated in ~3 weeks. The potential acute and late side effects were reviewed. Informed consent was obtained. She will undergo CT simulation this afternoon with treatment to start 11/18/21.      Stella García MD PGY-3  Radiation Oncology      Attending addendum:   I saw and examined the patient with the resident and agree with the documented plan of care.     Ms. Alanis is a 89-year-old female with a rcT4 N0 M0 squamous cell carcinoma of the palate who presents for consideration of palliative radiotherapy. For complete details regarding her oncologic history, please see my initial consultation note from 10/29/2021.    On examination, she has a large exophytic tumor encompassing the entirety of the bilateral hard palate and anterior aspect of the soft palate. She additionally has obvious disease involvement of the right maxillary sinus with infiltration into the overlying soft tissues and skin involvement. Given her advanced age and poor baseline functional status, I recommended treatment with a short course of palliative radiation therapy consisting of 1480 cGy in 4 fractions delivered in a twice daily manner to potentially be repeated 2 additional times 3 weeks apart for cumulative dose of 4440 cGy pending her tolerance of therapy. While this treatment will not have a high chance of cure, it will provide some degree of local control for her rapidly progressive disease.    Ms. Alanis was in agreement with these recommendations and signed informed consent to that effect. I will tentatively have her return to clinic next Thursday, 11/18/2021, to commence therapy.    Raj Ragland MD/PhD    Dept of Radiation " Oncology  [facility]

## 2021-11-18 ENCOUNTER — APPOINTMENT (OUTPATIENT)
Dept: RADIATION ONCOLOGY | Facility: CLINIC | Age: 86
End: 2021-11-18
Attending: RADIOLOGY
Payer: MEDICARE

## 2021-11-18 VITALS
HEART RATE: 74 BPM | WEIGHT: 95 LBS | BODY MASS INDEX: 16.31 KG/M2 | SYSTOLIC BLOOD PRESSURE: 188 MMHG | DIASTOLIC BLOOD PRESSURE: 77 MMHG

## 2021-11-18 DIAGNOSIS — C05.0 CANCER OF HARD PALATE (H): Primary | ICD-10-CM

## 2021-11-18 PROCEDURE — 77332 RADIATION TREATMENT AID(S): CPT | Performed by: RADIOLOGY

## 2021-11-18 PROCEDURE — 77333 RADIATION TREATMENT AID(S): CPT | Performed by: RADIOLOGY

## 2021-11-18 PROCEDURE — 77386 HC IMRT TREATMENT DELIVERY, COMPLEX: CPT | Performed by: RADIOLOGY

## 2021-11-18 PROCEDURE — 77014 PR CT GUIDE FOR PLACEMENT RADIATION THERAPY FIELDS: CPT | Mod: 26 | Performed by: RADIOLOGY

## 2021-11-18 PROCEDURE — 77386 HC IMRT TREATMENT DELIVERY, COMPLEX, ADDTL TREATMENT: CPT | Mod: 76 | Performed by: RADIOLOGY

## 2021-11-18 PROCEDURE — 77333 RADIATION TREATMENT AID(S): CPT | Mod: 26 | Performed by: RADIOLOGY

## 2021-11-18 NOTE — PROGRESS NOTES
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 Weekly RT visit. Current dose of 370 cGy. Mild to moderate pain overlying the right maxillary sinus.    ED visits/Hospitalizations:  None    Missed Treatments:  None    Subjective: Ms. Alanis presents to clinic today for her weekly on-treatment visit.  She tolerated the first fraction of palliative radiotherapy to the palate and paranasal sinuses well with no acute radiation-induced toxicities. She does complain of mild to moderate pain involving the oral cavity and around the right maxilla where the tumor is eroding through the anterior maxillary wall and invading the overlying soft tissues.    ROS:   Constitutional  Pain (0-10): 6 (mouth), 3 (throat), 3 (skin)   Fatigue: Moderate    CNS  Headaches: None    ENT  Mucositis: None    Cardiopulmonary  Dyspnea: None    GI  Nausea/vomiting: None    Nutrition  PEG: No  Diet: Regular    Integumentary  Dermatitis: None    Objective:   Current weight: 43.1 kg    BP: 188/77 (sitting), 156/77 (standing)  Pulse: 74 (sitting), 80 (standing)    General: Cachectic appearing 89-year-old female seated comfortably in an examination chair in no acute distress  HEENT: Stable appearing size of the tumor involving the right palate with erosion through the anterior maxillary sinus into the overlying skin.  There is mild erythema of the skin without any gross evidence of cutaneous breakdown.  Pulmonary: No wheezing, stridor or respiratory distress  Skin: No radiation dermatitis.  Erythema overlying the right maxillary  "tumor as described above.    Treatment-related toxicities (CTCAE v5.0):  None    Assessment:    Ms. 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 She is receiving palliative treatment with \"quad shot\" radiotherapy. She has tolerated initiation of therapy well with no acute radiation-induced toxicities.    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

## 2021-11-19 ENCOUNTER — APPOINTMENT (OUTPATIENT)
Dept: RADIATION ONCOLOGY | Facility: CLINIC | Age: 86
End: 2021-11-19
Attending: RADIOLOGY
Payer: MEDICARE

## 2021-11-19 PROCEDURE — 77427 RADIATION TX MANAGEMENT X5: CPT | Performed by: RADIOLOGY

## 2021-11-19 PROCEDURE — 77331 SPECIAL RADIATION DOSIMETRY: CPT | Mod: 26 | Performed by: RADIOLOGY

## 2021-11-19 PROCEDURE — 77386 HC IMRT TREATMENT DELIVERY, COMPLEX, ADDTL TREATMENT: CPT | Mod: 76 | Performed by: RADIOLOGY

## 2021-11-19 PROCEDURE — 77386 HC IMRT TREATMENT DELIVERY, COMPLEX: CPT | Performed by: RADIOLOGY

## 2021-11-19 PROCEDURE — 77331 SPECIAL RADIATION DOSIMETRY: CPT | Performed by: RADIOLOGY

## 2021-11-19 PROCEDURE — 77336 RADIATION PHYSICS CONSULT: CPT | Performed by: RADIOLOGY

## 2021-11-30 ENCOUNTER — ONCOLOGY VISIT (OUTPATIENT)
Dept: RADIATION ONCOLOGY | Facility: CLINIC | Age: 86
End: 2021-11-30
Payer: MEDICARE

## 2021-12-02 NOTE — PROCEDURES
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     Treatment Summary:  Treatment Site Dose  Modality From  To  Elapsed Days Fx.  Palate                    1,480 cGy       06 X  11/18/2021 2021   1              4          Dose per Fraction:   370 cGy     COMMENTS:  Ms. 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 She was initially diagnosed in 2020 and underwent wide local excision in 2020. She was found to have recurrent disease within the operative bed on CT neck 10/13/21. Given concern for Ms. Bradley's ability to tolerate aggressive treatment, palliative radiation was recommended for improved local disease control.      The tumor of the right palate was treated to a dose of 1480 cGy in 4 twice-daily fractions using 6 MV photons delivered via a 2 coplanar arc volumetric modulated arc therapy technique.      Ms. Bradley did not develop any acute toxicities while on treatment. She did not have any breaks in treatment.      Acute Toxicity Profile (CTCAE v5.0)  None     PAIN MANAGEMENT:   Acetaminophen as needed     FOLLOW UP PLAN:   Follow-up in radiation oncology clinic in 2 weeks for consideration of second course of treatment        Resident Physician: Stella García MD  Staff Physician: Raj Ragland MD, PhD  Physicist: Obi Singh, PhD     CC:   Rafat Villalobos MD                                       Radiation Oncology:  [facility] [address] [place] [address]

## 2021-12-03 ENCOUNTER — APPOINTMENT (OUTPATIENT)
Dept: RADIATION ONCOLOGY | Facility: CLINIC | Age: 86
End: 2021-12-03
Attending: RADIOLOGY
Payer: MEDICARE

## 2021-12-07 ENCOUNTER — DOCUMENTATION ONLY (OUTPATIENT)
Dept: FAMILY MEDICINE | Facility: CLINIC | Age: 86
End: 2021-12-07
Payer: MEDICARE

## 2021-12-07 NOTE — PROGRESS NOTES
"When opening a documentation only encounter, be sure to enter in \"Chief Complaint\" Forms and in \" Comments\" Title of form, description if needed.    Anca is a 89 year old  female  Form received via: Fax  Form now resides in: Provider Ready    Sharonda Mari MA    Form has been completed by provider.     Form sent out via: Fax to [employer] at Fax Number: [phone]  Patient informed: No, Reason:n/a  Output date: December 9, 2021    Sharonda Mari MA      **Please close the encounter**                  "

## 2021-12-09 ENCOUNTER — OFFICE VISIT (OUTPATIENT)
Dept: RADIATION ONCOLOGY | Facility: CLINIC | Age: 86
End: 2021-12-09
Attending: RADIOLOGY
Payer: MEDICARE

## 2021-12-09 VITALS
WEIGHT: 91 LBS | SYSTOLIC BLOOD PRESSURE: 168 MMHG | HEART RATE: 68 BPM | BODY MASS INDEX: 15.62 KG/M2 | DIASTOLIC BLOOD PRESSURE: 71 MMHG

## 2021-12-09 DIAGNOSIS — C05.0 CANCER OF HARD PALATE (H): Primary | ICD-10-CM

## 2021-12-09 PROCEDURE — 77386 HC IMRT TREATMENT DELIVERY, COMPLEX, ADDTL TREATMENT: CPT | Mod: 76 | Performed by: RADIOLOGY

## 2021-12-09 PROCEDURE — 77386 HC IMRT TREATMENT DELIVERY, COMPLEX: CPT | Performed by: RADIOLOGY

## 2021-12-10 ENCOUNTER — APPOINTMENT (OUTPATIENT)
Dept: RADIATION ONCOLOGY | Facility: CLINIC | Age: 86
End: 2021-12-10
Attending: RADIOLOGY
Payer: MEDICARE

## 2021-12-10 PROCEDURE — 77386 HC IMRT TREATMENT DELIVERY, COMPLEX, ADDTL TREATMENT: CPT | Mod: 76 | Performed by: RADIOLOGY

## 2021-12-10 PROCEDURE — 77336 RADIATION PHYSICS CONSULT: CPT | Performed by: RADIOLOGY

## 2021-12-10 PROCEDURE — 77386 HC IMRT TREATMENT DELIVERY, COMPLEX: CPT | Performed by: RADIOLOGY

## 2021-12-10 NOTE — PROGRESS NOTES
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 Weekly RT visit. Current dose of 370 cGy. Mild to moderate pain overlying the right maxillary sinus.    21: Weekly RT visit. Current dose of 1850 cGy. Tolerating treatment well.    ED visits/Hospitalizations:  None    Missed Treatments:  None    Subjective: Ms. Alanis presents to clinic today for her on-treatment visit. She tolerated the first course of palliative quad shot radiotherapy well and returns for planned delivery of the second course of 4 fraction treatment. She continues to note mild to moderate oral cavity pain that she rates as a 3/10 in severity. She is eating a regular diet without difficulty although her weight is down approximately 2 kg since her previous treatment.    ROS:   Constitutional  Pain (0-10): 3 (mouth), 0 (throat), 0 (skin)   Fatigue: Moderate    CNS  Headaches: None    ENT  Mucositis: None    Cardiopulmonary  Dyspnea: None    GI  Nausea/vomiting: None    Nutrition  PEG: No  Diet: Regular    Integumentary  Dermatitis: None    Objective:   Current weight: 41.3 kg    BP: 168/71 (sitting), 162/74 (standing)  Pulse: 68 (sitting), 74 (standing)    General: Cachectic appearing 89-year-old female seated comfortably in a chair in no acute distress  HEENT: Exophytic tumor involving the entirety of the right hemipalate with erosion through the anterior maxilla and involvement of the overlying skin.  Stable to slightly decreased in size compared to previous examination.  No additional intraoral lesions  "appreciated.  Pulmonary: No wheezing, stridor or respiratory distress  Skin: Mild erythema of the right maxilla. No desquamation.    Treatment-related toxicities (CTCAE v5.0):  None    Assessment:    Ms. 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 She is receiving palliative treatment with \"quad shot\" radiotherapy. She tolerated the first course of radiotherapy well with no significant acute treatment-related toxicities.    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

## 2021-12-14 ENCOUNTER — ONCOLOGY VISIT (OUTPATIENT)
Dept: RADIATION ONCOLOGY | Facility: CLINIC | Age: 86
End: 2021-12-14
Payer: MEDICARE

## 2021-12-21 NOTE — PROCEDURES
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     Treatment Summary:  Treatment Site     Dose            Modality    From         To            Elapsed Days Fx.  Palate                     1,480 cGy      06 X        12/9/2021        12/10/2021    1            4          Dose per Fraction:   370 cGy     COMMENTS:  Ms. 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 She was initially diagnosed in 2020 and underwent wide local excision in 2020. She was found to have recurrent disease within the operative bed on CT neck 10/13/21. Given concern for Ms. Bradley's ability to tolerate aggressive treatment, palliative radiation was recommended for improved local disease control. She tolerated the first course of \"quad shot\" radiotherapy well and completed this 21. She then received a second course as described above for a cumulative dose of 2,960 cGy.     The tumor of the right palate was treated to a dose of 1480 cGy in 4 twice-daily fractions using 6 MV photons delivered via a 2 coplanar arc volumetric modulated arc therapy technique.      Ms. Bradley did not develop any acute toxicities while on treatment. She did not have any breaks in treatment.      Acute Toxicity Profile (CTCAE v5.0)  None     PAIN MANAGEMENT:   Acetaminophen as needed     FOLLOW UP PLAN:   Follow-up in radiation oncology clinic in 2 weeks for consideration of third course of treatment        Resident Physician: Stella García MD  Staff Physician: Raj Ragland MD, " PhD  Physicist: Obi Singh, PhD     CC:   Rafat Villalobos MD                                       Radiation Oncology:  [facility] [address] [place] [address]

## 2021-12-23 ENCOUNTER — APPOINTMENT (OUTPATIENT)
Dept: RADIATION ONCOLOGY | Facility: CLINIC | Age: 86
End: 2021-12-23
Attending: RADIOLOGY
Payer: MEDICARE

## 2021-12-31 DIAGNOSIS — G30.1 LATE ONSET ALZHEIMER'S DISEASE WITHOUT BEHAVIORAL DISTURBANCE (H): ICD-10-CM

## 2021-12-31 DIAGNOSIS — F02.80 LATE ONSET ALZHEIMER'S DISEASE WITHOUT BEHAVIORAL DISTURBANCE (H): ICD-10-CM

## 2021-12-31 NOTE — TELEPHONE ENCOUNTER
"Request for medication refill:  memantine XR (NAMENDA XR) 14 MG 24 hr capsule    Providers if patient needs an appointment and you are willing to give a one month supply please refill for one month and  send a letter/MyChart using \".SMILLIMITEDREFILL\" .smillimited and route chart to \"P [facility] \" (Giving one month refill in non controlled medications is strongly recommended before denial)    If refill has been denied, meaning absolutely no refills without visit, please complete the smart phrase \".smirxrefuse\" and route it to the \"P [facility] MED REFILLS\"  pool to inform the patient and the pharmacy.    Manju Aleman MA        "

## 2022-01-02 RX ORDER — MEMANTINE HYDROCHLORIDE 14 MG/1
14 CAPSULE, EXTENDED RELEASE ORAL DAILY
Qty: 90 CAPSULE | Refills: 1 | Status: SHIPPED | OUTPATIENT
Start: 2022-01-02

## 2022-01-05 DIAGNOSIS — F41.1 ANXIETY STATE: Primary | ICD-10-CM

## 2022-01-05 RX ORDER — LORAZEPAM 0.5 MG/1
0.5 TABLET ORAL EVERY 6 HOURS PRN
Qty: 10 TABLET | Refills: 0 | Status: SHIPPED | OUTPATIENT
Start: 2022-01-05

## 2022-01-26 ENCOUNTER — TELEPHONE (OUTPATIENT)
Dept: FAMILY MEDICINE | Facility: CLINIC | Age: 87
End: 2022-01-26
Payer: MEDICARE

## 2022-03-08 ENCOUNTER — DOCUMENTATION ONLY (OUTPATIENT)
Dept: FAMILY MEDICINE | Facility: CLINIC | Age: 87
End: 2022-03-08
Payer: MEDICARE

## 2022-03-08 NOTE — PROGRESS NOTES
"When opening a documentation only encounter, be sure to enter in \"Chief Complaint\" Forms and in \" Comments\" Title of form, description if needed.    Anca is a 89 year old  female  Form received via: Fax  Form now resides in: Provider Ready    Sharonda Mari MA                  "

## 2022-03-09 NOTE — PROGRESS NOTES
Form has been completed by provider.     Form sent out via: Fax to [employer] at Fax Number: [phone]  Patient informed: No, Reason:  Output date: March 9, 2022    Manju Aleman MA      **Please close the encounter**

## 2022-06-10 NOTE — TELEPHONE ENCOUNTER
"Request for medication refill:memantine (NAMENDA) 5 MG tablet    Providers if patient needs an appointment and you are willing to give a one month supply please refill for one month and  send a letter/MyChart using \".SMILLIMITEDREFILL\" .smillimited and route chart to \"P [facility] \" (Giving one month refill in non controlled medications is strongly recommended before denial)    If refill has been denied, meaning absolutely no refills without visit, please complete the smart phrase \".smirxrefuse\" and route it to the \"P [facility] MED REFILLS\"  pool to inform the patient and the pharmacy.    Law Na, MA        " Initiate Treatment: Clindamycin-benzoyl peroxide apply to buttocks bid Detail Level: Zone Plan: advised a lot of this is due to sweating.  should shower or cleanse after exercise

## 2022-06-19 ENCOUNTER — HEALTH MAINTENANCE LETTER (OUTPATIENT)
Age: 87
End: 2022-06-19

## 2022-07-15 NOTE — PROGRESS NOTES
Patient was contacted by an RN for post DC follow up so no duplicate post DC follow up call will be made    A nurse from the [facility] will contact you 24 hours, or next business day, after your discharge from the hospital   Griseofulvin Counseling:  I discussed with the patient the risks of griseofulvin including but not limited to photosensitivity, cytopenia, liver damage, nausea/vomiting and severe allergy.  The patient understands that this medication is best absorbed when taken with a fatty meal (e.g., ice cream or french fries).

## 2022-11-19 ENCOUNTER — HEALTH MAINTENANCE LETTER (OUTPATIENT)
Age: 87
End: 2022-11-19

## 2023-05-18 NOTE — PROGRESS NOTES
"When opening a documentation only encounter, be sure to enter in \"Chief Complaint\" Forms and in \" Comments\" Title of form, description if needed.    Anca is a 89 year old  female  Form received via: Fax  Form now resides in: Provider Ready    Tala Beaver CMA                Form has been completed by provider.     Form sent out via: Fax to [phone]  Patient informed: No, Reason:fax confirmation confirmed  Output date: June 7, 2018    Tala Beaver CMA      **Please close the encounter**      " verbal cues/nonverbal cues (demo/gestures)/1 person assist

## 2023-07-01 ENCOUNTER — HEALTH MAINTENANCE LETTER (OUTPATIENT)
Age: 88
End: 2023-07-01

## 2024-06-17 PROBLEM — Z76.89 HEALTH CARE HOME: Status: RESOLVED | Noted: 2024-06-17 | Resolved: 2024-06-17

## 2024-07-29 NOTE — MR AVS SNAPSHOT
After Visit Summary   3/16/2017    Anca Alanis    MRN: [id number]           Patient Information     Date Of Birth          1924        Visit Information        Provider Department      3/16/2017 10:00 AM Tariq Resendiz MD [facility]        Today's Diagnoses     AK (actinic keratosis)    -  1    SK (seborrheic keratosis)           Follow-ups after your visit        Who to contact     Please call your clinic at [phone] to:    Ask questions about your health    Make or cancel appointments    Discuss your medicines    Learn about your test results    Speak to your doctor   If you have compliments or concerns about an experience at your clinic, or if you wish to file a complaint, please contact [facility] Patient Relations at [phone] or email us at [email or web address]         Additional Information About Your Visit        MyChart Information     [employer] is an electronic gateway that provides easy, online access to your medical records. With [employer], you can request a clinic appointment, read your test results, renew a prescription or communicate with your care team.     To sign up for [employer] visit the website at [email or web address]   You will be asked to enter the access code listed below, as well as some personal information. Please follow the directions to create your username and password.     Your access code is: [email or web address]  Expires: 2017  7:30 AM     Your access code will  in 90 days. If you need help or a new code, please contact your [facility] or call [phone] for assistance.        Care EveryWhere ID     This is your Care EveryWhere ID. This could be used by other organizations to access your [facility] medical records  [id number]         Blood Pressure from Last 3 Encounters:   16 134/66   16 145/75   02/10/16 138/72    Weight from Last 3 Encounters:  Refill sent.  Please call patient to schedule follow-up, next routine available.  Due for follow-up 9/6     06/29/16 48.4 kg (106 lb 9.6 oz)   04/06/16 48.5 kg (107 lb)   02/10/16 49 kg (108 lb)              We Performed the Following     DESTRUCT PREMALIGNANT LESION, FIRST        Primary Care Provider Office Phone # Fax #    Rafat Richards [phone]       [facility] [address] [address]        Thank you!     Thank you for choosing [facility]  for your care. Our goal is always to provide you with excellent care. Hearing back from our patients is one way we can continue to improve our services. Please take a few minutes to complete the written survey that you may receive in the mail after your visit with us. Thank you!             Your Updated Medication List - Protect others around you: Learn how to safely use, store and throw away your medicines at www.disposemymeds.org.          This list is accurate as of: 3/16/17 11:59 PM.  Always use your most recent med list.                   Brand Name Dispense Instructions for use    acetaminophen 325 MG tablet    TYLENOL    100 tablet    Take 2 tablets (650 mg) by mouth 3 times daily For 5 days       alendronate 70 MG tablet    FOSAMAX    12 tablet    Take 1 tablet (70 mg) by mouth every 7 days       CALCIUM + D PO      Take  by mouth.       donepezil 5 MG tablet    ARIcept    90 tablet    Take 1 tablet (5 mg) by mouth At Bedtime       ibuprofen 600 MG tablet    ADVIL/MOTRIN    90 tablet    Take 1 tablet (600 mg) by mouth every 6 hours as needed for moderate pain For 5 days       memantine 5 MG tablet    NAMENDA    180 tablet    Take 1 tablet (5 mg) by mouth 2 times daily       MULTIVITAMIN PO      Take  by mouth.       zolpidem 5 MG tablet    AMBIEN    5 tablet    Take 0.5 tablets (2.5 mg) by mouth nightly as needed for sleep (Use only when having difficulty sleeping in an environment away from home.  Try 1/2 tablet and use sparingly.)

## 2024-08-24 ENCOUNTER — HEALTH MAINTENANCE LETTER (OUTPATIENT)
Age: 89
End: 2024-08-24

## (undated) DEVICE — STPL SKIN 35W ROTATING HEAD PRW35

## (undated) DEVICE — LINEN TOWEL PACK X6 WHITE 5487

## (undated) DEVICE — SU SILK 2-0 TIE 12X30" A305H

## (undated) DEVICE — SU SILK 2-0 SH CR 5X18" C0125

## (undated) DEVICE — TOOTHBRUSH ADULT NON STERILE MDS136850

## (undated) DEVICE — PACK NEURO MINOR UMMC SNE32MNMU4

## (undated) DEVICE — SUCTION SLEEVE NEPTUNE 2 125MM 0703-005-125

## (undated) DEVICE — BUR STRK EGG 4.0X44.5MM 10 FLUTE 1607-002-035

## (undated) DEVICE — ESU ELEC BLADE 2.75" COATED/INSULATED E1455

## (undated) DEVICE — ESU PENCIL SMOKE EVAC W/ROCKER SWITCH 0703-047-000

## (undated) DEVICE — CLIP HORIZON MED BLUE 002200

## (undated) DEVICE — CLIP HORIZON SM RED WIDE SLOT 001201

## (undated) DEVICE — DRSG TELFA 3X8" 1238

## (undated) DEVICE — ESU ELEC NDL 1" COATED/INSULATED E1465

## (undated) DEVICE — LINEN TOWEL PACK X5 5464

## (undated) DEVICE — PREP POVIDONE IODINE SOLUTION 10% 4OZ

## (undated) DEVICE — STRAP UNIVERSAL POSITIONING 2-PIECE 4X47X76" 91-287

## (undated) DEVICE — SUCTION MANIFOLD DORNOCH ULTRA CART UL-CL500

## (undated) DEVICE — SU SILK 4-0 TIE 12X30" A303H

## (undated) DEVICE — SU SILK 3-0 TIE 12X30" A304H

## (undated) DEVICE — ESU CORD BIPOLAR AND IRR TUBING AESCULAP US355

## (undated) DEVICE — DRSG XEROFORM 5X9" CUR253590W

## (undated) DEVICE — SOL NACL 0.9% IRRIG 1000ML BOTTLE 2F7124

## (undated) DEVICE — ESU GROUND PAD ADULT W/CORD E7507

## (undated) DEVICE — DRAPE POUCH INSTRUMENT 1018

## (undated) RX ORDER — DEXTROSE MONOHYDRATE 25 G/50ML
INJECTION, SOLUTION INTRAVENOUS
Status: DISPENSED
Start: 2020-11-16

## (undated) RX ORDER — LIDOCAINE HYDROCHLORIDE 20 MG/ML
SOLUTION OROPHARYNGEAL
Status: DISPENSED
Start: 2021-01-27

## (undated) RX ORDER — LIDOCAINE HYDROCHLORIDE AND EPINEPHRINE 10; 10 MG/ML; UG/ML
INJECTION, SOLUTION INFILTRATION; PERINEURAL
Status: DISPENSED
Start: 2021-07-14

## (undated) RX ORDER — FENTANYL CITRATE 50 UG/ML
INJECTION, SOLUTION INTRAMUSCULAR; INTRAVENOUS
Status: DISPENSED
Start: 2018-03-05

## (undated) RX ORDER — LIDOCAINE HYDROCHLORIDE 10 MG/ML
INJECTION, SOLUTION EPIDURAL; INFILTRATION; INTRACAUDAL; PERINEURAL
Status: DISPENSED
Start: 2018-03-05

## (undated) RX ORDER — FENTANYL CITRATE 50 UG/ML
INJECTION, SOLUTION INTRAMUSCULAR; INTRAVENOUS
Status: DISPENSED
Start: 2020-11-16

## (undated) RX ORDER — LIDOCAINE HYDROCHLORIDE 10 MG/ML
INJECTION, SOLUTION EPIDURAL; INFILTRATION; INTRACAUDAL; PERINEURAL
Status: DISPENSED
Start: 2018-03-07

## (undated) RX ORDER — DEXTROSE MONOHYDRATE, SODIUM CHLORIDE, AND POTASSIUM CHLORIDE 50; 1.49; 4.5 G/1000ML; G/1000ML; G/1000ML
INJECTION, SOLUTION INTRAVENOUS
Status: DISPENSED
Start: 2020-11-16

## (undated) RX ORDER — LABETALOL HYDROCHLORIDE 5 MG/ML
INJECTION, SOLUTION INTRAVENOUS
Status: DISPENSED
Start: 2020-11-16